# Patient Record
Sex: FEMALE | Race: WHITE | NOT HISPANIC OR LATINO | Employment: OTHER | URBAN - METROPOLITAN AREA
[De-identification: names, ages, dates, MRNs, and addresses within clinical notes are randomized per-mention and may not be internally consistent; named-entity substitution may affect disease eponyms.]

---

## 2017-01-27 ENCOUNTER — APPOINTMENT (OUTPATIENT)
Dept: LAB | Facility: HOSPITAL | Age: 82
End: 2017-01-27
Attending: INTERNAL MEDICINE
Payer: MEDICARE

## 2017-01-27 ENCOUNTER — TRANSCRIBE ORDERS (OUTPATIENT)
Dept: ADMINISTRATIVE | Facility: HOSPITAL | Age: 82
End: 2017-01-27

## 2017-01-27 DIAGNOSIS — D64.9 ANEMIA, UNSPECIFIED: ICD-10-CM

## 2017-01-27 DIAGNOSIS — I10 ESSENTIAL HYPERTENSION, MALIGNANT: ICD-10-CM

## 2017-01-27 DIAGNOSIS — E11.9 DIABETES MELLITUS WITHOUT COMPLICATION (HCC): ICD-10-CM

## 2017-01-27 DIAGNOSIS — E03.9 UNSPECIFIED HYPOTHYROIDISM: ICD-10-CM

## 2017-01-27 DIAGNOSIS — D64.9 ANEMIA, UNSPECIFIED: Primary | ICD-10-CM

## 2017-01-27 LAB
ALBUMIN SERPL BCP-MCNC: 3.5 G/DL (ref 3.5–5)
ALP SERPL-CCNC: 73 U/L (ref 46–116)
ALT SERPL W P-5'-P-CCNC: 28 U/L (ref 12–78)
ANION GAP SERPL CALCULATED.3IONS-SCNC: 8 MMOL/L (ref 4–13)
AST SERPL W P-5'-P-CCNC: 21 U/L (ref 5–45)
BASOPHILS # BLD AUTO: 0 THOUSANDS/ΜL (ref 0–0.1)
BASOPHILS NFR BLD AUTO: 1 % (ref 0–1)
BILIRUB SERPL-MCNC: 0.4 MG/DL (ref 0.2–1)
BUN SERPL-MCNC: 12 MG/DL (ref 5–25)
CALCIUM SERPL-MCNC: 9.2 MG/DL (ref 8.3–10.1)
CHLORIDE SERPL-SCNC: 102 MMOL/L (ref 100–108)
CO2 SERPL-SCNC: 28 MMOL/L (ref 21–32)
CREAT SERPL-MCNC: 0.9 MG/DL (ref 0.6–1.3)
EOSINOPHIL # BLD AUTO: 0.2 THOUSAND/ΜL (ref 0–0.61)
EOSINOPHIL NFR BLD AUTO: 4 % (ref 0–6)
ERYTHROCYTE [DISTWIDTH] IN BLOOD BY AUTOMATED COUNT: 13.4 % (ref 11.6–15.1)
EST. AVERAGE GLUCOSE BLD GHB EST-MCNC: 111 MG/DL
GFR SERPL CREATININE-BSD FRML MDRD: >60 ML/MIN/1.73SQ M
GLUCOSE SERPL-MCNC: 85 MG/DL (ref 65–140)
HBA1C MFR BLD: 5.5 % (ref 4.2–6.3)
HCT VFR BLD AUTO: 41.6 % (ref 37–47)
HGB BLD-MCNC: 13.6 G/DL (ref 12–16)
LYMPHOCYTES # BLD AUTO: 1.5 THOUSANDS/ΜL (ref 0.6–4.47)
LYMPHOCYTES NFR BLD AUTO: 34 % (ref 14–44)
MCH RBC QN AUTO: 30.1 PG (ref 27–31)
MCHC RBC AUTO-ENTMCNC: 32.8 G/DL (ref 31.4–37.4)
MCV RBC AUTO: 92 FL (ref 82–98)
MONOCYTES # BLD AUTO: 0.4 THOUSAND/ΜL (ref 0.17–1.22)
MONOCYTES NFR BLD AUTO: 10 % (ref 4–12)
NEUTROPHILS # BLD AUTO: 2.2 THOUSANDS/ΜL (ref 1.85–7.62)
NEUTS SEG NFR BLD AUTO: 51 % (ref 43–75)
NRBC BLD AUTO-RTO: 0 /100 WBCS
PLATELET # BLD AUTO: 279 THOUSANDS/UL (ref 130–400)
PMV BLD AUTO: 7.1 FL (ref 8.9–12.7)
POTASSIUM SERPL-SCNC: 4 MMOL/L (ref 3.5–5.3)
PROT SERPL-MCNC: 7.4 G/DL (ref 6.4–8.2)
RBC # BLD AUTO: 4.54 MILLION/UL (ref 4.2–5.4)
SODIUM SERPL-SCNC: 138 MMOL/L (ref 136–145)
TSH SERPL DL<=0.05 MIU/L-ACNC: 2.54 UIU/ML (ref 0.36–3.74)
WBC # BLD AUTO: 4.3 THOUSAND/UL (ref 4.8–10.8)

## 2017-01-27 PROCEDURE — 84443 ASSAY THYROID STIM HORMONE: CPT

## 2017-01-27 PROCEDURE — 83036 HEMOGLOBIN GLYCOSYLATED A1C: CPT | Performed by: INTERNAL MEDICINE

## 2017-01-27 PROCEDURE — 36415 COLL VENOUS BLD VENIPUNCTURE: CPT | Performed by: INTERNAL MEDICINE

## 2017-01-27 PROCEDURE — 80053 COMPREHEN METABOLIC PANEL: CPT | Performed by: INTERNAL MEDICINE

## 2017-01-27 PROCEDURE — 85025 COMPLETE CBC W/AUTO DIFF WBC: CPT | Performed by: INTERNAL MEDICINE

## 2017-03-02 ENCOUNTER — ANESTHESIA (OUTPATIENT)
Dept: GASTROENTEROLOGY | Facility: AMBULARY SURGERY CENTER | Age: 82
End: 2017-03-02
Payer: MEDICARE

## 2017-03-02 ENCOUNTER — HOSPITAL ENCOUNTER (OUTPATIENT)
Facility: AMBULARY SURGERY CENTER | Age: 82
Setting detail: OUTPATIENT SURGERY
Discharge: HOME | End: 2017-03-02
Attending: INTERNAL MEDICINE | Admitting: INTERNAL MEDICINE
Payer: MEDICARE

## 2017-03-02 VITALS
RESPIRATION RATE: 18 BRPM | HEART RATE: 88 BPM | BODY MASS INDEX: 25.95 KG/M2 | HEIGHT: 64 IN | OXYGEN SATURATION: 98 % | WEIGHT: 152 LBS | SYSTOLIC BLOOD PRESSURE: 141 MMHG | DIASTOLIC BLOOD PRESSURE: 71 MMHG | TEMPERATURE: 98.7 F

## 2017-03-02 DIAGNOSIS — Z12.11 ENCOUNTER FOR SCREENING FOR MALIGNANT NEOPLASM OF COLON: ICD-10-CM

## 2017-03-02 PROCEDURE — 88305 TISSUE EXAM BY PATHOLOGIST: CPT | Performed by: INTERNAL MEDICINE

## 2017-03-02 RX ORDER — LANOLIN ALCOHOL/MO/W.PET/CERES
1000 CREAM (GRAM) TOPICAL DAILY
COMMUNITY

## 2017-03-02 RX ORDER — PROPOFOL 10 MG/ML
INJECTION, EMULSION INTRAVENOUS AS NEEDED
Status: DISCONTINUED | OUTPATIENT
Start: 2017-03-02 | End: 2017-03-02 | Stop reason: SURG

## 2017-03-02 RX ORDER — ATORVASTATIN CALCIUM 20 MG/1
20 TABLET, FILM COATED ORAL DAILY
COMMUNITY

## 2017-03-02 RX ORDER — DIPHENOXYLATE HYDROCHLORIDE AND ATROPINE SULFATE 2.5; .025 MG/1; MG/1
1 TABLET ORAL DAILY
COMMUNITY

## 2017-03-02 RX ORDER — LISINOPRIL 20 MG/1
20 TABLET ORAL DAILY
COMMUNITY

## 2017-03-02 RX ORDER — SODIUM CHLORIDE, SODIUM LACTATE, POTASSIUM CHLORIDE, CALCIUM CHLORIDE 600; 310; 30; 20 MG/100ML; MG/100ML; MG/100ML; MG/100ML
100 INJECTION, SOLUTION INTRAVENOUS CONTINUOUS
Status: DISCONTINUED | OUTPATIENT
Start: 2017-03-02 | End: 2017-03-02 | Stop reason: HOSPADM

## 2017-03-02 RX ADMIN — LIDOCAINE HYDROCHLORIDE 50 MG: 20 INJECTION, SOLUTION INTRAVENOUS at 12:28

## 2017-03-02 RX ADMIN — PROPOFOL 100 MG: 10 INJECTION, EMULSION INTRAVENOUS at 12:28

## 2017-03-02 RX ADMIN — PROPOFOL 50 MG: 10 INJECTION, EMULSION INTRAVENOUS at 12:35

## 2017-03-02 RX ADMIN — PROPOFOL 50 MG: 10 INJECTION, EMULSION INTRAVENOUS at 12:30

## 2017-03-02 RX ADMIN — SODIUM CHLORIDE, SODIUM LACTATE, POTASSIUM CHLORIDE, AND CALCIUM CHLORIDE 100 ML/HR: .6; .31; .03; .02 INJECTION, SOLUTION INTRAVENOUS at 12:13

## 2017-04-10 ENCOUNTER — TRANSCRIBE ORDERS (OUTPATIENT)
Dept: ADMINISTRATIVE | Facility: HOSPITAL | Age: 82
End: 2017-04-10

## 2017-04-10 ENCOUNTER — LAB (OUTPATIENT)
Dept: LAB | Facility: HOSPITAL | Age: 82
End: 2017-04-10
Attending: INTERNAL MEDICINE
Payer: MEDICARE

## 2017-04-10 DIAGNOSIS — E78.00 PURE HYPERCHOLESTEROLEMIA: ICD-10-CM

## 2017-04-10 DIAGNOSIS — E78.00 PURE HYPERCHOLESTEROLEMIA: Primary | ICD-10-CM

## 2017-04-10 LAB
CHOLEST SERPL-MCNC: 180 MG/DL (ref 50–200)
HDLC SERPL-MCNC: 47 MG/DL (ref 40–60)
LDLC SERPL CALC-MCNC: 111 MG/DL (ref 0–100)
TRIGL SERPL-MCNC: 111 MG/DL

## 2017-04-10 PROCEDURE — 36415 COLL VENOUS BLD VENIPUNCTURE: CPT

## 2017-04-10 PROCEDURE — 80061 LIPID PANEL: CPT

## 2017-10-02 ENCOUNTER — TRANSCRIBE ORDERS (OUTPATIENT)
Dept: ADMINISTRATIVE | Facility: HOSPITAL | Age: 82
End: 2017-10-02

## 2017-10-02 DIAGNOSIS — Z12.31 VISIT FOR SCREENING MAMMOGRAM: Primary | ICD-10-CM

## 2017-10-04 ENCOUNTER — HOSPITAL ENCOUNTER (OUTPATIENT)
Dept: RADIOLOGY | Facility: HOSPITAL | Age: 82
Discharge: HOME | End: 2017-10-04
Attending: INTERNAL MEDICINE
Payer: MEDICARE

## 2017-10-04 DIAGNOSIS — Z12.31 VISIT FOR SCREENING MAMMOGRAM: ICD-10-CM

## 2017-10-04 PROCEDURE — G0202 SCR MAMMO BI INCL CAD: HCPCS

## 2017-12-08 ENCOUNTER — TRANSCRIBE ORDERS (OUTPATIENT)
Dept: ADMINISTRATIVE | Facility: HOSPITAL | Age: 82
End: 2017-12-08

## 2017-12-08 DIAGNOSIS — Z12.39 BREAST SCREENING: Primary | ICD-10-CM

## 2017-12-13 ENCOUNTER — HOSPITAL ENCOUNTER (OUTPATIENT)
Dept: RADIOLOGY | Facility: HOSPITAL | Age: 82
Discharge: HOME | End: 2017-12-13
Attending: INTERNAL MEDICINE
Payer: MEDICARE

## 2017-12-13 ENCOUNTER — GENERIC CONVERSION - ENCOUNTER (OUTPATIENT)
Dept: OTHER | Facility: OTHER | Age: 82
End: 2017-12-13

## 2017-12-13 DIAGNOSIS — Z12.39 BREAST SCREENING: ICD-10-CM

## 2017-12-13 PROCEDURE — 77080 DXA BONE DENSITY AXIAL: CPT

## 2018-05-17 ENCOUNTER — TRANSCRIBE ORDERS (OUTPATIENT)
Dept: ADMINISTRATIVE | Facility: HOSPITAL | Age: 83
End: 2018-05-17

## 2018-05-17 ENCOUNTER — APPOINTMENT (OUTPATIENT)
Dept: LAB | Facility: HOSPITAL | Age: 83
End: 2018-05-17
Attending: INTERNAL MEDICINE
Payer: MEDICARE

## 2018-05-17 DIAGNOSIS — E78.00 PURE HYPERCHOLESTEROLEMIA: ICD-10-CM

## 2018-05-17 DIAGNOSIS — D63.1 ANEMIA IN CHRONIC KIDNEY DISEASE, UNSPECIFIED CKD STAGE: Primary | ICD-10-CM

## 2018-05-17 DIAGNOSIS — E55.9 VITAMIN D DEFICIENCY DISEASE: ICD-10-CM

## 2018-05-17 DIAGNOSIS — E03.9 MYXEDEMA HEART DISEASE: ICD-10-CM

## 2018-05-17 DIAGNOSIS — D63.1 ANEMIA IN CHRONIC KIDNEY DISEASE, UNSPECIFIED CKD STAGE: ICD-10-CM

## 2018-05-17 DIAGNOSIS — I10 ESSENTIAL HYPERTENSION, MALIGNANT: ICD-10-CM

## 2018-05-17 DIAGNOSIS — N39.0 URINARY TRACT INFECTION WITHOUT HEMATURIA, SITE UNSPECIFIED: ICD-10-CM

## 2018-05-17 DIAGNOSIS — I51.9 MYXEDEMA HEART DISEASE: ICD-10-CM

## 2018-05-17 DIAGNOSIS — N18.9 ANEMIA IN CHRONIC KIDNEY DISEASE, UNSPECIFIED CKD STAGE: Primary | ICD-10-CM

## 2018-05-17 DIAGNOSIS — E10.8 TYPE 1 DIABETES MELLITUS WITH COMPLICATION (HCC): ICD-10-CM

## 2018-05-17 DIAGNOSIS — N18.9 ANEMIA IN CHRONIC KIDNEY DISEASE, UNSPECIFIED CKD STAGE: ICD-10-CM

## 2018-05-17 LAB
25(OH)D3 SERPL-MCNC: 29.6 NG/ML (ref 30–100)
ALBUMIN SERPL BCP-MCNC: 3.6 G/DL (ref 3.5–5)
ALP SERPL-CCNC: 80 U/L (ref 46–116)
ALT SERPL W P-5'-P-CCNC: 34 U/L (ref 12–78)
ANION GAP SERPL CALCULATED.3IONS-SCNC: 8 MMOL/L (ref 4–13)
AST SERPL W P-5'-P-CCNC: 20 U/L (ref 5–45)
BACTERIA UR QL AUTO: ABNORMAL /HPF
BASOPHILS # BLD AUTO: 0.04 THOUSANDS/ΜL (ref 0–0.1)
BASOPHILS NFR BLD AUTO: 1 % (ref 0–1)
BILIRUB SERPL-MCNC: 0.5 MG/DL (ref 0.2–1)
BILIRUB UR QL STRIP: NEGATIVE
BUN SERPL-MCNC: 11 MG/DL (ref 5–25)
CALCIUM SERPL-MCNC: 9.2 MG/DL (ref 8.3–10.1)
CHLORIDE SERPL-SCNC: 102 MMOL/L (ref 100–108)
CHOLEST SERPL-MCNC: 157 MG/DL (ref 50–200)
CLARITY UR: ABNORMAL
CO2 SERPL-SCNC: 28 MMOL/L (ref 21–32)
COLOR UR: ABNORMAL
CREAT SERPL-MCNC: 0.91 MG/DL (ref 0.6–1.3)
EOSINOPHIL # BLD AUTO: 0.29 THOUSAND/ΜL (ref 0–0.61)
EOSINOPHIL NFR BLD AUTO: 5 % (ref 0–6)
ERYTHROCYTE [DISTWIDTH] IN BLOOD BY AUTOMATED COUNT: 12.9 % (ref 11.6–15.1)
EST. AVERAGE GLUCOSE BLD GHB EST-MCNC: 105 MG/DL
GFR SERPL CREATININE-BSD FRML MDRD: 59 ML/MIN/1.73SQ M
GLUCOSE P FAST SERPL-MCNC: 92 MG/DL (ref 65–99)
GLUCOSE UR STRIP-MCNC: NEGATIVE MG/DL
HBA1C MFR BLD: 5.3 % (ref 4.2–6.3)
HCT VFR BLD AUTO: 41.6 % (ref 34.8–46.1)
HDLC SERPL-MCNC: 36 MG/DL (ref 40–60)
HGB BLD-MCNC: 13.3 G/DL (ref 11.5–15.4)
HGB UR QL STRIP.AUTO: ABNORMAL
IMM GRANULOCYTES # BLD AUTO: 0.02 THOUSAND/UL (ref 0–0.2)
IMM GRANULOCYTES NFR BLD AUTO: 0 % (ref 0–2)
KETONES UR STRIP-MCNC: NEGATIVE MG/DL
LDLC SERPL CALC-MCNC: 84 MG/DL (ref 0–100)
LEUKOCYTE ESTERASE UR QL STRIP: ABNORMAL
LYMPHOCYTES # BLD AUTO: 1.58 THOUSANDS/ΜL (ref 0.6–4.47)
LYMPHOCYTES NFR BLD AUTO: 30 % (ref 14–44)
MCH RBC QN AUTO: 30.6 PG (ref 26.8–34.3)
MCHC RBC AUTO-ENTMCNC: 32 G/DL (ref 31.4–37.4)
MCV RBC AUTO: 96 FL (ref 82–98)
MONOCYTES # BLD AUTO: 0.61 THOUSAND/ΜL (ref 0.17–1.22)
MONOCYTES NFR BLD AUTO: 11 % (ref 4–12)
NEUTROPHILS # BLD AUTO: 2.81 THOUSANDS/ΜL (ref 1.85–7.62)
NEUTS SEG NFR BLD AUTO: 53 % (ref 43–75)
NITRITE UR QL STRIP: POSITIVE
NON-SQ EPI CELLS URNS QL MICRO: ABNORMAL /HPF
NONHDLC SERPL-MCNC: 121 MG/DL
NRBC BLD AUTO-RTO: 0 /100 WBCS
PH UR STRIP.AUTO: 7.5 [PH] (ref 5–9)
PLATELET # BLD AUTO: 304 THOUSANDS/UL (ref 149–390)
PMV BLD AUTO: 8.8 FL (ref 8.9–12.7)
POTASSIUM SERPL-SCNC: 4.1 MMOL/L (ref 3.5–5.3)
PROT SERPL-MCNC: 7.5 G/DL (ref 6.4–8.2)
PROT UR STRIP-MCNC: NEGATIVE MG/DL
RBC # BLD AUTO: 4.35 MILLION/UL (ref 3.81–5.12)
RBC #/AREA URNS AUTO: ABNORMAL /HPF
SODIUM SERPL-SCNC: 138 MMOL/L (ref 136–145)
SP GR UR STRIP.AUTO: 1.01 (ref 1–1.03)
TRIGL SERPL-MCNC: 184 MG/DL
TSH SERPL DL<=0.05 MIU/L-ACNC: 2.19 UIU/ML (ref 0.36–3.74)
UROBILINOGEN UR QL STRIP.AUTO: 0.2 E.U./DL
WBC # BLD AUTO: 5.35 THOUSAND/UL (ref 4.31–10.16)
WBC #/AREA URNS AUTO: ABNORMAL /HPF

## 2018-05-17 PROCEDURE — 84443 ASSAY THYROID STIM HORMONE: CPT

## 2018-05-17 PROCEDURE — 80053 COMPREHEN METABOLIC PANEL: CPT

## 2018-05-17 PROCEDURE — 85025 COMPLETE CBC W/AUTO DIFF WBC: CPT

## 2018-05-17 PROCEDURE — 80061 LIPID PANEL: CPT

## 2018-05-17 PROCEDURE — 82306 VITAMIN D 25 HYDROXY: CPT

## 2018-05-17 PROCEDURE — 83036 HEMOGLOBIN GLYCOSYLATED A1C: CPT

## 2018-05-17 PROCEDURE — 36415 COLL VENOUS BLD VENIPUNCTURE: CPT

## 2018-05-17 PROCEDURE — 81001 URINALYSIS AUTO W/SCOPE: CPT | Performed by: INTERNAL MEDICINE

## 2018-05-29 ENCOUNTER — APPOINTMENT (OUTPATIENT)
Dept: LAB | Facility: HOSPITAL | Age: 83
End: 2018-05-29
Attending: INTERNAL MEDICINE
Payer: MEDICARE

## 2018-05-29 DIAGNOSIS — N39.0 URINARY TRACT INFECTION WITHOUT HEMATURIA, SITE UNSPECIFIED: ICD-10-CM

## 2018-05-29 LAB
BACTERIA UR QL AUTO: ABNORMAL /HPF
BILIRUB UR QL STRIP: NEGATIVE
CLARITY UR: ABNORMAL
COLOR UR: YELLOW
GLUCOSE UR STRIP-MCNC: NEGATIVE MG/DL
HGB UR QL STRIP.AUTO: NEGATIVE
KETONES UR STRIP-MCNC: NEGATIVE MG/DL
LEUKOCYTE ESTERASE UR QL STRIP: ABNORMAL
NITRITE UR QL STRIP: POSITIVE
NON-SQ EPI CELLS URNS QL MICRO: ABNORMAL /HPF
PH UR STRIP.AUTO: 7 [PH] (ref 5–9)
PROT UR STRIP-MCNC: NEGATIVE MG/DL
RBC #/AREA URNS AUTO: ABNORMAL /HPF
SP GR UR STRIP.AUTO: <=1.005 (ref 1–1.03)
UROBILINOGEN UR QL STRIP.AUTO: 0.2 E.U./DL
WBC #/AREA URNS AUTO: ABNORMAL /HPF

## 2018-05-29 PROCEDURE — 81001 URINALYSIS AUTO W/SCOPE: CPT | Performed by: INTERNAL MEDICINE

## 2018-05-29 PROCEDURE — 87186 SC STD MICRODIL/AGAR DIL: CPT

## 2018-05-29 PROCEDURE — 87077 CULTURE AEROBIC IDENTIFY: CPT

## 2018-05-29 PROCEDURE — 87086 URINE CULTURE/COLONY COUNT: CPT

## 2018-05-31 LAB — BACTERIA UR CULT: ABNORMAL

## 2018-07-14 ENCOUNTER — HOSPITAL ENCOUNTER (INPATIENT)
Facility: HOSPITAL | Age: 83
LOS: 5 days | Discharge: NURSING, REHAB OR LONG-TERM CARE | DRG: 369 | End: 2018-07-19
Attending: EMERGENCY MEDICINE | Admitting: INTERNAL MEDICINE
Payer: MEDICARE

## 2018-07-14 ENCOUNTER — APPOINTMENT (EMERGENCY)
Dept: RADIOLOGY | Facility: HOSPITAL | Age: 83
DRG: 369 | End: 2018-07-14
Payer: MEDICARE

## 2018-07-14 DIAGNOSIS — E87.6 HYPOKALEMIA: Primary | ICD-10-CM

## 2018-07-14 DIAGNOSIS — E87.6 HYPOKALEMIA DUE TO INADEQUATE POTASSIUM INTAKE: ICD-10-CM

## 2018-07-14 DIAGNOSIS — N39.0 UTI (URINARY TRACT INFECTION): ICD-10-CM

## 2018-07-14 DIAGNOSIS — K52.9 GASTROENTERITIS: ICD-10-CM

## 2018-07-14 DIAGNOSIS — K92.2 GI BLEED: ICD-10-CM

## 2018-07-14 LAB
ALBUMIN SERPL BCP-MCNC: 3.5 G/DL (ref 3.5–5)
ALP SERPL-CCNC: 62 U/L (ref 46–116)
ALT SERPL W P-5'-P-CCNC: 18 U/L (ref 12–78)
ANION GAP SERPL CALCULATED.3IONS-SCNC: 13 MMOL/L (ref 4–13)
AST SERPL W P-5'-P-CCNC: 16 U/L (ref 5–45)
BACTERIA UR QL AUTO: ABNORMAL /HPF
BASOPHILS # BLD AUTO: 0.01 THOUSANDS/ΜL (ref 0–0.1)
BASOPHILS # BLD AUTO: 0.01 THOUSANDS/ΜL (ref 0–0.1)
BASOPHILS NFR BLD AUTO: 0 % (ref 0–1)
BASOPHILS NFR BLD AUTO: 0 % (ref 0–1)
BILIRUB SERPL-MCNC: 0.5 MG/DL (ref 0.2–1)
BILIRUB UR QL STRIP: NEGATIVE
BUN SERPL-MCNC: 20 MG/DL (ref 5–25)
CALCIUM SERPL-MCNC: 8.5 MG/DL (ref 8.3–10.1)
CHLORIDE SERPL-SCNC: 103 MMOL/L (ref 100–108)
CLARITY UR: CLEAR
CO2 SERPL-SCNC: 23 MMOL/L (ref 21–32)
COLOR UR: YELLOW
CREAT SERPL-MCNC: 0.68 MG/DL (ref 0.6–1.3)
EOSINOPHIL # BLD AUTO: 0 THOUSAND/ΜL (ref 0–0.61)
EOSINOPHIL # BLD AUTO: 0 THOUSAND/ΜL (ref 0–0.61)
EOSINOPHIL NFR BLD AUTO: 0 % (ref 0–6)
EOSINOPHIL NFR BLD AUTO: 0 % (ref 0–6)
ERYTHROCYTE [DISTWIDTH] IN BLOOD BY AUTOMATED COUNT: 12.9 % (ref 11.6–15.1)
ERYTHROCYTE [DISTWIDTH] IN BLOOD BY AUTOMATED COUNT: 13 % (ref 11.6–15.1)
GFR SERPL CREATININE-BSD FRML MDRD: 82 ML/MIN/1.73SQ M
GLUCOSE SERPL-MCNC: 114 MG/DL (ref 65–140)
GLUCOSE UR STRIP-MCNC: NEGATIVE MG/DL
HCT VFR BLD AUTO: 31.6 % (ref 34.8–46.1)
HCT VFR BLD AUTO: 35.2 % (ref 34.8–46.1)
HGB BLD-MCNC: 10.6 G/DL (ref 11.5–15.4)
HGB BLD-MCNC: 12.1 G/DL (ref 11.5–15.4)
HGB UR QL STRIP.AUTO: ABNORMAL
IMM GRANULOCYTES # BLD AUTO: 0.05 THOUSAND/UL (ref 0–0.2)
IMM GRANULOCYTES # BLD AUTO: 0.06 THOUSAND/UL (ref 0–0.2)
IMM GRANULOCYTES NFR BLD AUTO: 1 % (ref 0–2)
IMM GRANULOCYTES NFR BLD AUTO: 1 % (ref 0–2)
KETONES UR STRIP-MCNC: ABNORMAL MG/DL
LEUKOCYTE ESTERASE UR QL STRIP: NEGATIVE
LIPASE SERPL-CCNC: 171 U/L (ref 73–393)
LYMPHOCYTES # BLD AUTO: 1.04 THOUSANDS/ΜL (ref 0.6–4.47)
LYMPHOCYTES # BLD AUTO: 1.3 THOUSANDS/ΜL (ref 0.6–4.47)
LYMPHOCYTES NFR BLD AUTO: 12 % (ref 14–44)
LYMPHOCYTES NFR BLD AUTO: 15 % (ref 14–44)
MCH RBC QN AUTO: 30.5 PG (ref 26.8–34.3)
MCH RBC QN AUTO: 31 PG (ref 26.8–34.3)
MCHC RBC AUTO-ENTMCNC: 33.5 G/DL (ref 31.4–37.4)
MCHC RBC AUTO-ENTMCNC: 34.4 G/DL (ref 31.4–37.4)
MCV RBC AUTO: 90 FL (ref 82–98)
MCV RBC AUTO: 91 FL (ref 82–98)
MONOCYTES # BLD AUTO: 0.67 THOUSAND/ΜL (ref 0.17–1.22)
MONOCYTES # BLD AUTO: 1.11 THOUSAND/ΜL (ref 0.17–1.22)
MONOCYTES NFR BLD AUTO: 13 % (ref 4–12)
MONOCYTES NFR BLD AUTO: 8 % (ref 4–12)
NEUTROPHILS # BLD AUTO: 6.08 THOUSANDS/ΜL (ref 1.85–7.62)
NEUTROPHILS # BLD AUTO: 6.87 THOUSANDS/ΜL (ref 1.85–7.62)
NEUTS SEG NFR BLD AUTO: 71 % (ref 43–75)
NEUTS SEG NFR BLD AUTO: 79 % (ref 43–75)
NITRITE UR QL STRIP: POSITIVE
NON-SQ EPI CELLS URNS QL MICRO: ABNORMAL /HPF
NRBC BLD AUTO-RTO: 0 /100 WBCS
NRBC BLD AUTO-RTO: 0 /100 WBCS
PH UR STRIP.AUTO: 6 [PH] (ref 5–9)
PLATELET # BLD AUTO: 253 THOUSANDS/UL (ref 149–390)
PLATELET # BLD AUTO: 286 THOUSANDS/UL (ref 149–390)
PMV BLD AUTO: 9.1 FL (ref 8.9–12.7)
PMV BLD AUTO: 9.3 FL (ref 8.9–12.7)
POTASSIUM SERPL-SCNC: 2.7 MMOL/L (ref 3.5–5.3)
PROT SERPL-MCNC: 7 G/DL (ref 6.4–8.2)
PROT UR STRIP-MCNC: NEGATIVE MG/DL
RBC # BLD AUTO: 3.48 MILLION/UL (ref 3.81–5.12)
RBC # BLD AUTO: 3.9 MILLION/UL (ref 3.81–5.12)
RBC #/AREA URNS AUTO: ABNORMAL /HPF
SODIUM SERPL-SCNC: 139 MMOL/L (ref 136–145)
SP GR UR STRIP.AUTO: 1.02 (ref 1–1.03)
UROBILINOGEN UR QL STRIP.AUTO: 0.2 E.U./DL
WBC # BLD AUTO: 8.56 THOUSAND/UL (ref 4.31–10.16)
WBC # BLD AUTO: 8.64 THOUSAND/UL (ref 4.31–10.16)
WBC #/AREA URNS AUTO: ABNORMAL /HPF

## 2018-07-14 PROCEDURE — 96375 TX/PRO/DX INJ NEW DRUG ADDON: CPT

## 2018-07-14 PROCEDURE — 96361 HYDRATE IV INFUSION ADD-ON: CPT

## 2018-07-14 PROCEDURE — 83690 ASSAY OF LIPASE: CPT | Performed by: EMERGENCY MEDICINE

## 2018-07-14 PROCEDURE — 36415 COLL VENOUS BLD VENIPUNCTURE: CPT | Performed by: EMERGENCY MEDICINE

## 2018-07-14 PROCEDURE — 71275 CT ANGIOGRAPHY CHEST: CPT

## 2018-07-14 PROCEDURE — 80053 COMPREHEN METABOLIC PANEL: CPT | Performed by: EMERGENCY MEDICINE

## 2018-07-14 PROCEDURE — C9113 INJ PANTOPRAZOLE SODIUM, VIA: HCPCS | Performed by: EMERGENCY MEDICINE

## 2018-07-14 PROCEDURE — 85025 COMPLETE CBC W/AUTO DIFF WBC: CPT | Performed by: INTERNAL MEDICINE

## 2018-07-14 PROCEDURE — 81001 URINALYSIS AUTO W/SCOPE: CPT | Performed by: EMERGENCY MEDICINE

## 2018-07-14 PROCEDURE — 85025 COMPLETE CBC W/AUTO DIFF WBC: CPT | Performed by: EMERGENCY MEDICINE

## 2018-07-14 PROCEDURE — 74177 CT ABD & PELVIS W/CONTRAST: CPT

## 2018-07-14 PROCEDURE — 96376 TX/PRO/DX INJ SAME DRUG ADON: CPT

## 2018-07-14 PROCEDURE — 96374 THER/PROPH/DIAG INJ IV PUSH: CPT

## 2018-07-14 PROCEDURE — C9113 INJ PANTOPRAZOLE SODIUM, VIA: HCPCS | Performed by: INTERNAL MEDICINE

## 2018-07-14 PROCEDURE — 99285 EMERGENCY DEPT VISIT HI MDM: CPT

## 2018-07-14 RX ORDER — CYCLOPENTOLATE HYDROCHLORIDE 10 MG/ML
1 SOLUTION/ DROPS OPHTHALMIC 3 TIMES DAILY
COMMUNITY

## 2018-07-14 RX ORDER — PREDNISOLONE ACETATE 10 MG/ML
1 SUSPENSION/ DROPS OPHTHALMIC 4 TIMES DAILY
Status: DISCONTINUED | OUTPATIENT
Start: 2018-07-14 | End: 2018-07-19 | Stop reason: HOSPADM

## 2018-07-14 RX ORDER — DEXTROSE, SODIUM CHLORIDE, AND POTASSIUM CHLORIDE 5; .9; .15 G/100ML; G/100ML; G/100ML
75 INJECTION INTRAVENOUS CONTINUOUS
Status: DISCONTINUED | OUTPATIENT
Start: 2018-07-14 | End: 2018-07-19 | Stop reason: HOSPADM

## 2018-07-14 RX ORDER — OFLOXACIN 3 MG/ML
1 SOLUTION/ DROPS OPHTHALMIC 4 TIMES DAILY
COMMUNITY

## 2018-07-14 RX ORDER — PREDNISOLONE ACETATE 10 MG/ML
1 SUSPENSION/ DROPS OPHTHALMIC 4 TIMES DAILY
COMMUNITY

## 2018-07-14 RX ORDER — ONDANSETRON 2 MG/ML
4 INJECTION INTRAMUSCULAR; INTRAVENOUS EVERY 6 HOURS PRN
Status: DISCONTINUED | OUTPATIENT
Start: 2018-07-14 | End: 2018-07-19 | Stop reason: HOSPADM

## 2018-07-14 RX ORDER — CHOLECALCIFEROL (VITAMIN D3) 125 MCG
1000 CAPSULE ORAL DAILY
Status: DISCONTINUED | OUTPATIENT
Start: 2018-07-14 | End: 2018-07-19 | Stop reason: HOSPADM

## 2018-07-14 RX ORDER — POTASSIUM CHLORIDE 20 MEQ/1
40 TABLET, EXTENDED RELEASE ORAL ONCE
Status: COMPLETED | OUTPATIENT
Start: 2018-07-14 | End: 2018-07-14

## 2018-07-14 RX ORDER — LEVOFLOXACIN 5 MG/ML
500 INJECTION, SOLUTION INTRAVENOUS EVERY 24 HOURS
Status: DISCONTINUED | OUTPATIENT
Start: 2018-07-15 | End: 2018-07-19 | Stop reason: HOSPADM

## 2018-07-14 RX ORDER — OFLOXACIN 3 MG/ML
1 SOLUTION/ DROPS OPHTHALMIC 4 TIMES DAILY
Status: DISCONTINUED | OUTPATIENT
Start: 2018-07-14 | End: 2018-07-19 | Stop reason: HOSPADM

## 2018-07-14 RX ORDER — ONDANSETRON 2 MG/ML
4 INJECTION INTRAMUSCULAR; INTRAVENOUS ONCE
Status: COMPLETED | OUTPATIENT
Start: 2018-07-14 | End: 2018-07-14

## 2018-07-14 RX ORDER — LISINOPRIL 20 MG/1
20 TABLET ORAL DAILY
Status: DISCONTINUED | OUTPATIENT
Start: 2018-07-14 | End: 2018-07-19 | Stop reason: HOSPADM

## 2018-07-14 RX ORDER — LEVOFLOXACIN 5 MG/ML
500 INJECTION, SOLUTION INTRAVENOUS ONCE
Status: COMPLETED | OUTPATIENT
Start: 2018-07-14 | End: 2018-07-14

## 2018-07-14 RX ORDER — FLUTICASONE PROPIONATE 50 MCG
1 SPRAY, SUSPENSION (ML) NASAL 2 TIMES DAILY
COMMUNITY

## 2018-07-14 RX ORDER — FLUTICASONE PROPIONATE 50 MCG
1 SPRAY, SUSPENSION (ML) NASAL 2 TIMES DAILY
Status: DISCONTINUED | OUTPATIENT
Start: 2018-07-14 | End: 2018-07-19 | Stop reason: HOSPADM

## 2018-07-14 RX ORDER — ATORVASTATIN CALCIUM 20 MG/1
20 TABLET, FILM COATED ORAL
Status: DISCONTINUED | OUTPATIENT
Start: 2018-07-14 | End: 2018-07-19 | Stop reason: HOSPADM

## 2018-07-14 RX ORDER — ACETAMINOPHEN 325 MG/1
650 TABLET ORAL EVERY 6 HOURS PRN
Status: DISCONTINUED | OUTPATIENT
Start: 2018-07-14 | End: 2018-07-17

## 2018-07-14 RX ORDER — ONDANSETRON 2 MG/ML
INJECTION INTRAMUSCULAR; INTRAVENOUS
Status: COMPLETED
Start: 2018-07-14 | End: 2018-07-14

## 2018-07-14 RX ORDER — PANTOPRAZOLE SODIUM 40 MG/1
40 INJECTION, POWDER, FOR SOLUTION INTRAVENOUS EVERY 12 HOURS SCHEDULED
Status: DISCONTINUED | OUTPATIENT
Start: 2018-07-14 | End: 2018-07-19 | Stop reason: HOSPADM

## 2018-07-14 RX ORDER — CYCLOPENTOLATE HYDROCHLORIDE 10 MG/ML
1 SOLUTION/ DROPS OPHTHALMIC 3 TIMES DAILY
Status: DISCONTINUED | OUTPATIENT
Start: 2018-07-14 | End: 2018-07-19 | Stop reason: HOSPADM

## 2018-07-14 RX ORDER — PANTOPRAZOLE SODIUM 40 MG/1
40 INJECTION, POWDER, FOR SOLUTION INTRAVENOUS ONCE
Status: COMPLETED | OUTPATIENT
Start: 2018-07-14 | End: 2018-07-14

## 2018-07-14 RX ADMIN — OFLOXACIN 1 DROP: 3 SOLUTION/ DROPS OPHTHALMIC at 17:22

## 2018-07-14 RX ADMIN — IOHEXOL 100 ML: 350 INJECTION, SOLUTION INTRAVENOUS at 11:47

## 2018-07-14 RX ADMIN — ONDANSETRON 4 MG: 2 INJECTION INTRAMUSCULAR; INTRAVENOUS at 10:37

## 2018-07-14 RX ADMIN — PANTOPRAZOLE SODIUM 40 MG: 40 INJECTION, POWDER, FOR SOLUTION INTRAVENOUS at 15:00

## 2018-07-14 RX ADMIN — SODIUM CHLORIDE 1000 ML: 0.9 INJECTION, SOLUTION INTRAVENOUS at 10:33

## 2018-07-14 RX ADMIN — LISINOPRIL 20 MG: 20 TABLET ORAL at 17:22

## 2018-07-14 RX ADMIN — PANTOPRAZOLE SODIUM 40 MG: 40 INJECTION, POWDER, FOR SOLUTION INTRAVENOUS at 12:46

## 2018-07-14 RX ADMIN — PREDNISOLONE ACETATE 1 DROP: 10 SUSPENSION/ DROPS OPHTHALMIC at 17:22

## 2018-07-14 RX ADMIN — PREDNISOLONE ACETATE 1 DROP: 10 SUSPENSION/ DROPS OPHTHALMIC at 22:22

## 2018-07-14 RX ADMIN — ONDANSETRON 4 MG: 2 INJECTION INTRAMUSCULAR; INTRAVENOUS at 12:43

## 2018-07-14 RX ADMIN — FLUTICASONE PROPIONATE 1 SPRAY: 50 SPRAY, METERED NASAL at 17:22

## 2018-07-14 RX ADMIN — Medication 1000 MCG: at 15:00

## 2018-07-14 RX ADMIN — CYCLOPENTOLATE HYDROCHLORIDE 1 DROP: 10 SOLUTION/ DROPS OPHTHALMIC at 17:22

## 2018-07-14 RX ADMIN — OFLOXACIN 1 DROP: 3 SOLUTION/ DROPS OPHTHALMIC at 14:46

## 2018-07-14 RX ADMIN — LEVOFLOXACIN 500 MG: 5 INJECTION, SOLUTION INTRAVENOUS at 13:31

## 2018-07-14 RX ADMIN — ATORVASTATIN CALCIUM 20 MG: 20 TABLET, FILM COATED ORAL at 17:23

## 2018-07-14 RX ADMIN — POTASSIUM CHLORIDE 40 MEQ: 1500 TABLET, EXTENDED RELEASE ORAL at 11:30

## 2018-07-14 RX ADMIN — PREDNISOLONE ACETATE 1 DROP: 10 SUSPENSION/ DROPS OPHTHALMIC at 14:46

## 2018-07-14 RX ADMIN — PANTOPRAZOLE SODIUM 40 MG: 40 INJECTION, POWDER, FOR SOLUTION INTRAVENOUS at 22:00

## 2018-07-14 RX ADMIN — OFLOXACIN 1 DROP: 3 SOLUTION/ DROPS OPHTHALMIC at 21:49

## 2018-07-14 RX ADMIN — DEXTROSE, SODIUM CHLORIDE, AND POTASSIUM CHLORIDE 75 ML/HR: 5; .9; .15 INJECTION INTRAVENOUS at 15:48

## 2018-07-14 RX ADMIN — CYCLOPENTOLATE HYDROCHLORIDE 1 DROP: 10 SOLUTION/ DROPS OPHTHALMIC at 22:00

## 2018-07-14 NOTE — ED PROVIDER NOTES
History  Chief Complaint   Patient presents with    Vomiting     pt c/o nausea and vomiting for 2 days, lower abd pain since last night  pt had l eye surgery Wed  Pt has no appetite, not eating according to nephew   Weakness - Generalized     Patient is a 33-year-old female was brought in by her son for several days of not being able to get out of bed, having episodes of nausea vomiting and diarrhea  Patient had a eye surgery done 2 days ago 1 day prior to the onset of the symptoms  Patient denies any fevers or chills, she does have intermittent lower abdominal pain that she is not sure is associated with anything in particular  Patient states she has no appetite and basic is not eaten anything for several days and only a small amount of water  Patient called her son that she feel well wanted to come to the hospital so son picked her up and brought her here for evaluation  The patient lives alone and nobody in the house to give her any care  Patient denies any chest pain or shortness of breath  Prior to Admission Medications   Prescriptions Last Dose Informant Patient Reported?  Taking?   aspirin 81 MG tablet 2018 at Unknown time  Yes Yes   Sig: Take 81 mg by mouth daily   atorvastatin (LIPITOR) 20 mg tablet 2018 at Unknown time  Yes Yes   Sig: Take 20 mg by mouth daily   cyanocobalamin (VITAMIN B-12) 1,000 mcg tablet 2018 at Unknown time  Yes Yes   Sig: Take 1,000 mcg by mouth daily   cyclopentolate (CYCLOGYL) 1 % ophthalmic solution 2018 at 1106  Yes Yes   Sig: Administer 1 drop into the left eye 3 (three) times a day   fluticasone (FLONASE) 50 mcg/act nasal spray 2018 at Unknown time  Yes Yes   Si spray into each nostril 2 (two) times a day   lisinopril (ZESTRIL) 20 mg tablet 2018 at Unknown time  Yes Yes   Sig: Take 20 mg by mouth daily   multivitamin (THERAGRAN) TABS 2018 at Unknown time  Yes Yes   Sig: Take 1 tablet by mouth daily   ofloxacin (OCUFLOX) 0 3 % ophthalmic solution 2018 at 1054  Yes Yes   Sig: Administer 1 drop into the left eye 4 (four) times a day   prednisoLONE acetate (PREDNISOLONE ACETATE P-F) 1 % ophthalmic suspension 2018 at 1114  Yes Yes   Si drop 4 (four) times a day      Facility-Administered Medications: None       Past Medical History:   Diagnosis Date    Arthritis     Hyperlipidemia     Hypertension     Urinary tract infection        Past Surgical History:   Procedure Laterality Date    CATARACT EXTRACTION      COLONOSCOPY N/A 3/2/2017    Procedure: COLONOSCOPY;  Surgeon: Marie Bravo MD;  Location: [facility]; Service:     JOINT REPLACEMENT Bilateral ,     hips    REFRACTIVE SURGERY Right        History reviewed  No pertinent family history  I have reviewed and agree with the history as documented  Social History   Substance Use Topics    Smoking status: Never Smoker    Smokeless tobacco: Not on file    Alcohol use Not on file      Comment: on holidays        Review of Systems   Constitutional: Negative for chills and fever  HENT: Negative for facial swelling and trouble swallowing  Eyes: Negative for photophobia and discharge  Respiratory: Negative for chest tightness and shortness of breath  Cardiovascular: Negative for chest pain and leg swelling  Gastrointestinal: Positive for abdominal pain, diarrhea, nausea and vomiting  Genitourinary: Negative for dysuria and flank pain  Musculoskeletal: Negative for back pain, neck pain and neck stiffness  Skin: Negative  Neurological: Negative for weakness and numbness  Hematological: Negative  Psychiatric/Behavioral: Negative  Physical Exam  Physical Exam   Constitutional: She is oriented to person, place, and time  She appears well-developed and well-nourished  No distress  HENT:   Head: Normocephalic and atraumatic  Eyes: Pupils are equal, round, and reactive to light  Neck: Normal range of motion   Neck supple  Cardiovascular: Normal rate and regular rhythm  Pulmonary/Chest: Effort normal and breath sounds normal    Abdominal: Soft  Normal appearance  There is tenderness in the right lower quadrant, suprapubic area and left lower quadrant  There is no rigidity, no rebound and no guarding  Musculoskeletal: Normal range of motion  She exhibits no edema  Neurological: She is alert and oriented to person, place, and time  Skin: Skin is warm and dry  Capillary refill takes 2 to 3 seconds  Psychiatric: She has a normal mood and affect  Nursing note and vitals reviewed        Vital Signs  ED Triage Vitals   Temperature Pulse Respirations Blood Pressure SpO2   07/14/18 1114 07/14/18 0940 07/14/18 0940 07/14/18 0940 07/14/18 0940   98 1 °F (36 7 °C) 86 18 (!) 176/88 98 %      Temp Source Heart Rate Source Patient Position - Orthostatic VS BP Location FiO2 (%)   07/14/18 0940 07/14/18 0940 07/14/18 0940 07/14/18 0940 --   Tympanic Monitor Lying Left arm       Pain Score       --                  Vitals:    07/14/18 0940 07/14/18 1114   BP: (!) 176/88 (!) 175/80   Pulse: 86 82   Patient Position - Orthostatic VS: Lying Lying       Visual Acuity      ED Medications  Medications   levofloxacin (LEVAQUIN) IVPB (premix) 500 mg (not administered)   sodium chloride 0 9 % bolus 1,000 mL (1,000 mL Intravenous New Bag 7/14/18 1033)   ondansetron (ZOFRAN) injection 4 mg (4 mg Intravenous Given 7/14/18 1037)   potassium chloride (K-DUR,KLOR-CON) CR tablet 40 mEq (40 mEq Oral Given 7/14/18 1130)   iohexol (OMNIPAQUE) 350 MG/ML injection (MULTI-DOSE) 100 mL (100 mL Intravenous Given 7/14/18 1147)   ondansetron (ZOFRAN) injection 4 mg (4 mg Intravenous Given 7/14/18 1243)   pantoprazole (PROTONIX) injection 40 mg (40 mg Intravenous Given 7/14/18 1246)       Diagnostic Studies  Results Reviewed     Procedure Component Value Units Date/Time    Urine Microscopic [[id number]]  (Abnormal) Collected:  07/14/18 1120    Lab Status: Final result Specimen:  Urine from Urine, Clean Catch Updated:  07/14/18 1135     RBC, UA 0-1 (A) /hpf      WBC, UA 4-10 (A) /hpf      Epithelial Cells Occasional /hpf      Bacteria, UA Innumerable (A) /hpf     Comprehensive metabolic panel [[id number]]  (Abnormal) Collected:  07/14/18 1033    Lab Status:  Final result Specimen:  Blood from Arm, Left Updated:  07/14/18 1126     Sodium 139 mmol/L      Potassium 2 7 (LL) mmol/L      Chloride 103 mmol/L      CO2 23 mmol/L      Anion Gap 13 mmol/L      BUN 20 mg/dL      Creatinine 0 68 mg/dL      Glucose 114 mg/dL      Calcium 8 5 mg/dL      AST 16 U/L      ALT 18 U/L      Alkaline Phosphatase 62 U/L      Total Protein 7 0 g/dL      Albumin 3 5 g/dL      Total Bilirubin 0 50 mg/dL      eGFR 82 ml/min/1 73sq m     Narrative:         National Kidney Disease Education Program recommendations are as follows:  GFR calculation is accurate only with a steady state creatinine  Chronic Kidney disease less than 60 ml/min/1 73 sq  meters  Kidney failure less than 15 ml/min/1 73 sq  meters      UA w Reflex to Microscopic w Reflex to Culture [[id number]]  (Abnormal) Collected:  07/14/18 1120    Lab Status:  Final result Specimen:  Urine from Urine, Clean Catch Updated:  07/14/18 1125     Color, UA Yellow     Clarity, UA Clear     Specific Gravity, UA 1 025     pH, UA 6 0     Leukocytes, UA Negative     Nitrite, UA Positive (A)     Protein, UA Negative mg/dl      Glucose, UA Negative mg/dl      Ketones, UA 15 (1+) (A) mg/dl      Urobilinogen, UA 0 2 E U /dl      Bilirubin, UA Negative     Blood, UA Moderate (A)    Lipase [[id number]]  (Normal) Collected:  07/14/18 1033    Lab Status:  Final result Specimen:  Blood from Arm, Left Updated:  07/14/18 1114     Lipase 171 u/L     CBC and differential [[id number]]  (Abnormal) Collected:  07/14/18 1033    Lab Status:  Final result Specimen:  Blood from Arm, Left Updated:  07/14/18 1041     WBC 8 64 Thousand/uL      RBC 3 90 Million/uL Hemoglobin 12 1 g/dL      Hematocrit 35 2 %      MCV 90 fL      MCH 31 0 pg      MCHC 34 4 g/dL      RDW 13 0 %      MPV 9 3 fL      Platelets 901 Thousands/uL      nRBC 0 /100 WBCs      Neutrophils Relative 79 (H) %      Immat GRANS % 1 %      Lymphocytes Relative 12 (L) %      Monocytes Relative 8 %      Eosinophils Relative 0 %      Basophils Relative 0 %      Neutrophils Absolute 6 87 Thousands/µL      Immature Grans Absolute 0 05 Thousand/uL      Lymphocytes Absolute 1 04 Thousands/µL      Monocytes Absolute 0 67 Thousand/µL      Eosinophils Absolute 0 00 Thousand/µL      Basophils Absolute 0 01 Thousands/µL                  PE Study with CT Abdomen and Pelvis with contrast   Final Result by Charmaine Mcmullen MD (07/14 2662)      1  Emphysema  2   No evidence of pulmonary embolus or other acute abnormality in the chest    3   Hiatal hernia and generalized mural thickening of the esophagus, consistent with esophagitis or presbyesophagus  4   No acute abnormality in the abdomen or pelvis  5   Sigmoid diverticulosis without evidence of diverticulitis  Workstation performed: [id number]                    Procedures  Procedures       Phone Contacts  ED Phone Contact    ED Course                               MDM  Number of Diagnoses or Management Options  GI bleed:   Hypokalemia:   UTI (urinary tract infection):   Diagnosis management comments: Patient had a episode of a coffee ground emesis while she was in the emergency room  The patient is found to have a UTI, low potassium male area  All pain  Patient will need to be admitted to the hospital for evaluation of the GI bleed, treatment of the UTI and replacement of the potassium  Patient states understanding is in agreement the assessment plan         Amount and/or Complexity of Data Reviewed  Clinical lab tests: ordered  Tests in the radiology section of CPT®: ordered and reviewed      CritCare Time    Disposition  Final diagnoses: Hypokalemia   GI bleed   UTI (urinary tract infection)     Time reflects when diagnosis was documented in both MDM as applicable and the Disposition within this note     Time User Action Codes Description Comment    7/14/2018 12:59 PM Ligia Polo Add [E87 6] Hypokalemia     7/14/2018 12:59 PM Ligia Polo Add [K92 2] GI bleed     7/14/2018  1:00 PM Ligia Polo Add [N39 0] UTI (urinary tract infection)       ED Disposition     ED Disposition Condition Comment    Admit  Case was discussed with Dr Hollingsworth and the patient's admission status was agreed to be Admission Status: inpatient status to the service of Dr Cuca French          Follow-up Information    None         Patient's Medications   Discharge Prescriptions    No medications on file     No discharge procedures on file      ED Provider  Electronically Signed by           Charles Hatfield MD  07/14/18 1991

## 2018-07-14 NOTE — ED NOTES
Bee Oneal RN made aware pt is on 3 eye drops   Pt just had l eye surgery July 11, instructions and all eye drops with pt       Skeet Day, JOSELYN  07/14/18 3026

## 2018-07-14 NOTE — H&P
H&P Exam - Arianne Solorio 82 y o  female MRN: [id number]    Unit/Bed#: [address] [place] [address] Encounter: [id number]      History of Present Illness     HPI:  Arianne Solorio is a 82 y o  female who presents with nausea vomiting and also noted to have small amount of bright red blood and dark blood which was noticed by the emergency room physician while she was in the ER  Patient has been not feeling well for last for 5 days  She went for cataract surgery but 3 days ago and her symptoms got worse after that felt weak tired not eating and drinking she also started having nausea vomiting diarrhea  She also had 1 single black bowel movement approximately 1 week ago  Patient is on aspirin 81 mg daily she is also on iron supplements  No fever cough congestion cold symptoms  No urinary symptoms of increased frequency urgency but does complain of abdominal discomfort feeling on and off no pain in the flank area  Workup in the emergency room has also revealed findings suggestive of urinary tract infection           Review of Systems   Constitutional: Positive for appetite change and fatigue  Negative for fever  HENT: Negative  Eyes: Negative  Respiratory: Negative for cough and shortness of breath  Cardiovascular: Negative for chest pain, palpitations and leg swelling  Gastrointestinal: Positive for abdominal pain, diarrhea, nausea and vomiting  Endocrine: Negative  Genitourinary: Negative for difficulty urinating, flank pain, hematuria and urgency  Musculoskeletal: Positive for arthralgias and myalgias  Skin: Negative  Neurological: Positive for weakness  Negative for dizziness, speech difficulty and headaches  Hematological: Negative          Historical Information   Past Medical History:   Diagnosis Date    Arthritis     Hyperlipidemia     Hypertension     Urinary tract infection      Past Surgical History:   Procedure Laterality Date    CATARACT EXTRACTION      COLONOSCOPY N/A 3/2/2017    Procedure: COLONOSCOPY;  Surgeon: Iris Degroot MD;  Location: [facility]; Service:     JOINT REPLACEMENT Bilateral 2011, 2012    hips    REFRACTIVE SURGERY Right      History reviewed  No pertinent family history  Social History   History   Alcohol Use No     Comment: on holidays     History   Drug Use No     History   Smoking Status    Never Smoker   Smokeless Tobacco    Never Used         Meds/Allergies     No Known Allergies    Prescriptions Prior to Admission   Medication Sig Dispense Refill Last Dose    aspirin 81 MG tablet Take 81 mg by mouth daily   7/13/2018 at 0900    atorvastatin (LIPITOR) 20 mg tablet Take 20 mg by mouth daily   7/13/2018 at 2100    cyanocobalamin (VITAMIN B-12) 1,000 mcg tablet Take 1,000 mcg by mouth daily   7/13/2018 at 0900    cyclopentolate (CYCLOGYL) 1 % ophthalmic solution Administer 1 drop into the left eye 3 (three) times a day   7/14/2018 at 1106    fluticasone (FLONASE) 50 mcg/act nasal spray 1 spray into each nostril 2 (two) times a day   7/13/2018 at 2100    lisinopril (ZESTRIL) 20 mg tablet Take 20 mg by mouth daily   7/13/2018 at 2100    multivitamin (THERAGRAN) TABS Take 1 tablet by mouth daily   7/13/2018 at 0900    ofloxacin (OCUFLOX) 0 3 % ophthalmic solution Administer 1 drop into the left eye 4 (four) times a day   7/14/2018 at 1054    prednisoLONE acetate (PREDNISOLONE ACETATE P-F) 1 % ophthalmic suspension 1 drop 4 (four) times a day   7/14/2018 at 1114       Objective   Vitals: Blood pressure (!) 165/105, pulse 69, temperature 97 7 °F (36 5 °C), temperature source Oral, resp  rate 18, height 5' 4" (1 626 m), weight 67 1 kg (148 lb), SpO2 95 %, not currently breastfeeding      No intake or output data in the 24 hours ending 07/14/18 1742    Invasive Devices     Peripheral Intravenous Line            Peripheral IV 07/14/18 Left Wrist less than 1 day                Physical Exam   Constitutional: She is oriented to person, place, and time  No distress  HENT:   Head: Atraumatic  Eyes: Conjunctivae are normal  Pupils are equal, round, and reactive to light  Neck: Neck supple  No thyromegaly present  Cardiovascular: Normal rate and regular rhythm  Murmur heard  Pulmonary/Chest: Effort normal and breath sounds normal    Abdominal: Soft  Bowel sounds are normal    Musculoskeletal: Normal range of motion  She exhibits no edema, tenderness or deformity  Neurological: She is alert and oriented to person, place, and time  She has normal reflexes  No cranial nerve deficit  Skin: Skin is dry  No rash noted  No pallor  Skin turgor is poor   Psychiatric: She has a normal mood and affect         Lab Results:   Recent Results (from the past 72 hour(s))   CBC and differential    Collection Time: 07/14/18 10:33 AM   Result Value Ref Range    WBC 8 64 4 31 - 10 16 Thousand/uL    RBC 3 90 3 81 - 5 12 Million/uL    Hemoglobin 12 1 11 5 - 15 4 g/dL    Hematocrit 35 2 34 8 - 46 1 %    MCV 90 82 - 98 fL    MCH 31 0 26 8 - 34 3 pg    MCHC 34 4 31 4 - 37 4 g/dL    RDW 13 0 11 6 - 15 1 %    MPV 9 3 8 9 - 12 7 fL    Platelets 875 895 - 739 Thousands/uL    nRBC 0 /100 WBCs    Neutrophils Relative 79 (H) 43 - 75 %    Immat GRANS % 1 0 - 2 %    Lymphocytes Relative 12 (L) 14 - 44 %    Monocytes Relative 8 4 - 12 %    Eosinophils Relative 0 0 - 6 %    Basophils Relative 0 0 - 1 %    Neutrophils Absolute 6 87 1 85 - 7 62 Thousands/µL    Immature Grans Absolute 0 05 0 00 - 0 20 Thousand/uL    Lymphocytes Absolute 1 04 0 60 - 4 47 Thousands/µL    Monocytes Absolute 0 67 0 17 - 1 22 Thousand/µL    Eosinophils Absolute 0 00 0 00 - 0 61 Thousand/µL    Basophils Absolute 0 01 0 00 - 0 10 Thousands/µL   Comprehensive metabolic panel    Collection Time: 07/14/18 10:33 AM   Result Value Ref Range    Sodium 139 136 - 145 mmol/L    Potassium 2 7 (LL) 3 5 - 5 3 mmol/L    Chloride 103 100 - 108 mmol/L    CO2 23 21 - 32 mmol/L    Anion Gap 13 4 - 13 mmol/L    BUN 20 5 - 25 mg/dL    Creatinine 0 68 0 60 - 1 30 mg/dL    Glucose 114 65 - 140 mg/dL    Calcium 8 5 8 3 - 10 1 mg/dL    AST 16 5 - 45 U/L    ALT 18 12 - 78 U/L    Alkaline Phosphatase 62 46 - 116 U/L    Total Protein 7 0 6 4 - 8 2 g/dL    Albumin 3 5 3 5 - 5 0 g/dL    Total Bilirubin 0 50 0 20 - 1 00 mg/dL    eGFR 82 ml/min/1 73sq m   Lipase    Collection Time: 07/14/18 10:33 AM   Result Value Ref Range    Lipase 171 73 - 393 u/L   UA w Reflex to Microscopic w Reflex to Culture    Collection Time: 07/14/18 11:20 AM   Result Value Ref Range    Color, UA Yellow     Clarity, UA Clear     Specific Gravity, UA 1 025 1 000 - 1 030    pH, UA 6 0 5 0 - 9 0    Leukocytes, UA Negative Negative    Nitrite, UA Positive (A) Negative    Protein, UA Negative Negative mg/dl    Glucose, UA Negative Negative mg/dl    Ketones, UA 15 (1+) (A) Negative mg/dl    Urobilinogen, UA 0 2 0 2, 1 0 E U /dl E U /dl    Bilirubin, UA Negative Negative    Blood, UA Moderate (A) Negative   Urine Microscopic    Collection Time: 07/14/18 11:20 AM   Result Value Ref Range    RBC, UA 0-1 (A) None Seen, 0-5 /hpf    WBC, UA 4-10 (A) None Seen, 0-5, 5-55, 5-65 /hpf    Epithelial Cells Occasional None Seen, Occasional /hpf    Bacteria, UA Innumerable (A) None Seen, Occasional /hpf       Imaging:   PE Study with CT Abdomen and Pelvis with contrast   Final Result      1  Emphysema  2   No evidence of pulmonary embolus or other acute abnormality in the chest    3   Hiatal hernia and generalized mural thickening of the esophagus, consistent with esophagitis or presbyesophagus  4   No acute abnormality in the abdomen or pelvis  5   Sigmoid diverticulosis without evidence of diverticulitis  Workstation performed: [id number]             PE Study with CT Abdomen and Pelvis with contrast   Final Result      1  Emphysema     2   No evidence of pulmonary embolus or other acute abnormality in the chest    3   Hiatal hernia and generalized mural thickening of the esophagus, consistent with esophagitis or presbyesophagus  4   No acute abnormality in the abdomen or pelvis  5   Sigmoid diverticulosis without evidence of diverticulitis  Workstation performed: [id number]             EKG, Pathology, and Other Studies: I have personally reviewed pertinent reports  Assessment/Plan     Assessment:  Patient with scant hematemesis most likely Diane-Cancino tear doubt ulcers however she is on aspirin which may be a contributing factor for GI bleed secondary to peptic ulcer disease  Patient did have a melanotic stool about a week ago  Nausea vomiting and diarrhea probably secondary to gastroenteritis  Urinary tract infection  Hypertension  Degenerative joint disease  Hypercholesterolemia  Plan:  Clear liquid diet  IV fluids  IV antibiotics  GI consultation  Monitor hemoglobin and hematocrit    Code Status: Level 1 - Full Code  Advance Directive and Living Will:      Power of :    POLST:      Counseling / Coordination of Care  Total floor / unit time spent today 60 minutes  Greater than 50% of total time was spent with the patient and / or family counseling and / or coordination of care  A description of the counseling / coordination of care:  Discussed with emergency room physician emergency room nurse staff on the floor discussed with the patient at length old records reviewed  GI consultation reviewed

## 2018-07-14 NOTE — CONSULTS
Consultation - GI   Jaswant Llanes 82 y o  female MRN: [id number]  Unit/Bed#: [address] [place] [address] Encounter: [id number]      Assessment/Plan     Assessment:  1  Scant hematemesis  Given the description by her nephew, this seems likely to represent a Diane-Cancino tear, though other etiologies should be considered  She has no evidence at this time active, ongoing, hemodynamically significant bleeding  2   Possible melenic stool 1 week ago  This raises the question of possible peptic ulcer disease secondary to aspirin  3  Nausea, vomiting, diarrhea  Symptoms most suggestive of an acute viral illness and improving  4   UTI  This might also be the cause of many of her symptoms    Plan:  1  No evidence active bleeding, therefore hold off on invasive, endoscopic evaluation  If she does have evidence significant GI bleeding, will consider urgent EGD  We will try to obtain records of her prior endoscopic evaluation  2   Continue pantoprazole  I    History of Present Illness   Physician Requesting Consult: Dean Marshall MD  Reason for Consult / Principal Problem:  Hematemesis  Hx and PE limited by:   HPI: Jaswant Llanes is a 82y o  year old female who presents with nausea, vomiting with some scant bright red blood, diarrhea, lethargy, malaise  Patient underwent ophthalmic surgery 3 days ago reports that even prior to this she was starting to feel somewhat on well  Since her surgery, however, she has developed the above symptoms and was unable to care for herself at home as she lives alone  She presented to the emergency room this morning where she was noted to evidence of urinary tract infection  She currently complains mild discomfort, fatigue but otherwise reports her nausea vomiting and diarrhea mostly resolved  She does report which she describes as a single black bowel movement approximately 1 week ago  She takes an iron supplement but this was different than her typical stool color    She reports having undergone upper endoscopy here approximately 2 years ago, though I cannot find any records of this  She takes a baby aspirin but denies other NSAID use  Consults    Review of Systems   Constitutional: Negative for fever  HENT: Negative for congestion, nosebleeds and trouble swallowing  Eyes: Positive for photophobia  Respiratory: Negative for cough  Cardiovascular: Negative for chest pain  Gastrointestinal: Positive for abdominal pain, diarrhea, nausea and vomiting  Negative for abdominal distention  Genitourinary: Negative for dysuria  Musculoskeletal: Positive for arthralgias  Skin: Negative for color change  Neurological: Positive for light-headedness  Negative for dizziness  Hematological: Negative for adenopathy  Psychiatric/Behavioral: Negative for agitation  Historical Information   Past Medical History:   Diagnosis Date    Arthritis     Hyperlipidemia     Hypertension     Urinary tract infection      Past Surgical History:   Procedure Laterality Date    CATARACT EXTRACTION      COLONOSCOPY N/A 3/2/2017    Procedure: COLONOSCOPY;  Surgeon: Mono Forrester MD;  Location: [facility]; Service:     JOINT REPLACEMENT Bilateral 2011, 2012    hips    REFRACTIVE SURGERY Right      Social History   History   Alcohol Use No     Comment: on holidays     History   Drug Use No     History   Smoking Status    Never Smoker   Smokeless Tobacco    Never Used     Family History:  Noncontributory  Meds/Allergies  reviewed      No Known Allergies    Objective     No intake or output data in the 24 hours ending 07/14/18 1517    Invasive Devices:        Physical Exam   Constitutional: She is oriented to person, place, and time  She appears well-developed  HENT:   Head: Normocephalic  Eyes: No scleral icterus  Neck: Normal range of motion  Cardiovascular: Normal rate  Pulmonary/Chest: Effort normal    Abdominal: Soft  She exhibits no distension   There is no tenderness  There is no rebound and no guarding  Musculoskeletal: Normal range of motion  Neurological: She is alert and oriented to person, place, and time  Skin: Skin is warm and dry  Psychiatric: She has a normal mood and affect  Lab Results: I have personally reviewed pertinent reports  Imaging Studies: I have personally reviewed pertinent reports  EKG, Pathology, and Other Studies: I have personally reviewed pertinent reports

## 2018-07-15 LAB
ANION GAP SERPL CALCULATED.3IONS-SCNC: 8 MMOL/L (ref 4–13)
BASOPHILS # BLD AUTO: 0.01 THOUSANDS/ΜL (ref 0–0.1)
BASOPHILS NFR BLD AUTO: 0 % (ref 0–1)
BUN SERPL-MCNC: 10 MG/DL (ref 5–25)
CALCIUM SERPL-MCNC: 8.1 MG/DL (ref 8.3–10.1)
CHLORIDE SERPL-SCNC: 106 MMOL/L (ref 100–108)
CO2 SERPL-SCNC: 24 MMOL/L (ref 21–32)
CREAT SERPL-MCNC: 0.67 MG/DL (ref 0.6–1.3)
EOSINOPHIL # BLD AUTO: 0.01 THOUSAND/ΜL (ref 0–0.61)
EOSINOPHIL NFR BLD AUTO: 0 % (ref 0–6)
ERYTHROCYTE [DISTWIDTH] IN BLOOD BY AUTOMATED COUNT: 13 % (ref 11.6–15.1)
GFR SERPL CREATININE-BSD FRML MDRD: 83 ML/MIN/1.73SQ M
GLUCOSE SERPL-MCNC: 120 MG/DL (ref 65–140)
HCT VFR BLD AUTO: 31.4 % (ref 34.8–46.1)
HGB BLD-MCNC: 10.6 G/DL (ref 11.5–15.4)
IMM GRANULOCYTES # BLD AUTO: 0.04 THOUSAND/UL (ref 0–0.2)
IMM GRANULOCYTES NFR BLD AUTO: 1 % (ref 0–2)
LYMPHOCYTES # BLD AUTO: 1.48 THOUSANDS/ΜL (ref 0.6–4.47)
LYMPHOCYTES NFR BLD AUTO: 18 % (ref 14–44)
MCH RBC QN AUTO: 30.8 PG (ref 26.8–34.3)
MCHC RBC AUTO-ENTMCNC: 33.8 G/DL (ref 31.4–37.4)
MCV RBC AUTO: 91 FL (ref 82–98)
MONOCYTES # BLD AUTO: 1.12 THOUSAND/ΜL (ref 0.17–1.22)
MONOCYTES NFR BLD AUTO: 14 % (ref 4–12)
NEUTROPHILS # BLD AUTO: 5.38 THOUSANDS/ΜL (ref 1.85–7.62)
NEUTS SEG NFR BLD AUTO: 67 % (ref 43–75)
NRBC BLD AUTO-RTO: 0 /100 WBCS
PLATELET # BLD AUTO: 253 THOUSANDS/UL (ref 149–390)
PMV BLD AUTO: 9.1 FL (ref 8.9–12.7)
POTASSIUM SERPL-SCNC: 2.7 MMOL/L (ref 3.5–5.3)
RBC # BLD AUTO: 3.44 MILLION/UL (ref 3.81–5.12)
SODIUM SERPL-SCNC: 138 MMOL/L (ref 136–145)
WBC # BLD AUTO: 8.04 THOUSAND/UL (ref 4.31–10.16)

## 2018-07-15 PROCEDURE — 80048 BASIC METABOLIC PNL TOTAL CA: CPT | Performed by: INTERNAL MEDICINE

## 2018-07-15 PROCEDURE — C9113 INJ PANTOPRAZOLE SODIUM, VIA: HCPCS | Performed by: INTERNAL MEDICINE

## 2018-07-15 PROCEDURE — 85025 COMPLETE CBC W/AUTO DIFF WBC: CPT | Performed by: INTERNAL MEDICINE

## 2018-07-15 RX ORDER — POTASSIUM CHLORIDE 14.9 MG/ML
20 INJECTION INTRAVENOUS ONCE
Status: COMPLETED | OUTPATIENT
Start: 2018-07-15 | End: 2018-07-15

## 2018-07-15 RX ORDER — POTASSIUM CHLORIDE 29.8 MG/ML
40 INJECTION INTRAVENOUS ONCE
Status: DISCONTINUED | OUTPATIENT
Start: 2018-07-15 | End: 2018-07-15 | Stop reason: CLARIF

## 2018-07-15 RX ADMIN — FLUTICASONE PROPIONATE 1 SPRAY: 50 SPRAY, METERED NASAL at 17:33

## 2018-07-15 RX ADMIN — PREDNISOLONE ACETATE 1 DROP: 10 SUSPENSION/ DROPS OPHTHALMIC at 11:10

## 2018-07-15 RX ADMIN — DEXTROSE, SODIUM CHLORIDE, AND POTASSIUM CHLORIDE 75 ML/HR: 5; .9; .15 INJECTION INTRAVENOUS at 06:32

## 2018-07-15 RX ADMIN — ATORVASTATIN CALCIUM 20 MG: 20 TABLET, FILM COATED ORAL at 17:33

## 2018-07-15 RX ADMIN — OFLOXACIN 1 DROP: 3 SOLUTION/ DROPS OPHTHALMIC at 17:31

## 2018-07-15 RX ADMIN — POTASSIUM CHLORIDE 20 MEQ: 200 INJECTION, SOLUTION INTRAVENOUS at 14:47

## 2018-07-15 RX ADMIN — FLUTICASONE PROPIONATE 1 SPRAY: 50 SPRAY, METERED NASAL at 08:57

## 2018-07-15 RX ADMIN — OFLOXACIN 1 DROP: 3 SOLUTION/ DROPS OPHTHALMIC at 08:57

## 2018-07-15 RX ADMIN — CYCLOPENTOLATE HYDROCHLORIDE 1 DROP: 10 SOLUTION/ DROPS OPHTHALMIC at 08:57

## 2018-07-15 RX ADMIN — CYCLOPENTOLATE HYDROCHLORIDE 1 DROP: 10 SOLUTION/ DROPS OPHTHALMIC at 17:31

## 2018-07-15 RX ADMIN — OFLOXACIN 1 DROP: 3 SOLUTION/ DROPS OPHTHALMIC at 11:10

## 2018-07-15 RX ADMIN — PREDNISOLONE ACETATE 1 DROP: 10 SUSPENSION/ DROPS OPHTHALMIC at 21:51

## 2018-07-15 RX ADMIN — PREDNISOLONE ACETATE 1 DROP: 10 SUSPENSION/ DROPS OPHTHALMIC at 08:57

## 2018-07-15 RX ADMIN — PANTOPRAZOLE SODIUM 40 MG: 40 INJECTION, POWDER, FOR SOLUTION INTRAVENOUS at 21:50

## 2018-07-15 RX ADMIN — CYCLOPENTOLATE HYDROCHLORIDE 1 DROP: 10 SOLUTION/ DROPS OPHTHALMIC at 21:50

## 2018-07-15 RX ADMIN — POTASSIUM CHLORIDE 20 MEQ: 200 INJECTION, SOLUTION INTRAVENOUS at 11:09

## 2018-07-15 RX ADMIN — PREDNISOLONE ACETATE 1 DROP: 10 SUSPENSION/ DROPS OPHTHALMIC at 17:31

## 2018-07-15 RX ADMIN — LEVOFLOXACIN 500 MG: 5 INJECTION, SOLUTION INTRAVENOUS at 19:37

## 2018-07-15 RX ADMIN — Medication 1000 MCG: at 08:56

## 2018-07-15 RX ADMIN — PANTOPRAZOLE SODIUM 40 MG: 40 INJECTION, POWDER, FOR SOLUTION INTRAVENOUS at 08:56

## 2018-07-15 RX ADMIN — OFLOXACIN 1 DROP: 3 SOLUTION/ DROPS OPHTHALMIC at 21:51

## 2018-07-15 RX ADMIN — LISINOPRIL 20 MG: 20 TABLET ORAL at 17:33

## 2018-07-15 NOTE — SOCIAL WORK
DASH discussion completed  Discussed goals of making sure pt's needs are met upon discharge, pt's preferences are taken into account, pt understands her health condition, medications and symptoms to watch for after returning home and pt is aware of any follow up appointments recommended by hospital physician  CM spoke with the pt at the bedside  Pt lives alone and has a cane and walker at home  Pt has no other DME or HHC in the home but does have friends that help her with homecare and shopping etc  Pt does drive and she uses [facility] in [place], [place] and [facility] for her medication

## 2018-07-15 NOTE — PROGRESS NOTES
Progress Note - Jason Lake 82 y o  female MRN: [id number]    Unit/Bed#: [address] [place] [address] Encounter: [id number]        Subjective:   Patient is still not feeling well  Does not feel like eating she is on clear liquids feels weak tired no nausea vomiting no loose bowel movements  Vital signs noted patient is afebrile  Lab reveals potassium of 2 7            Review of Systems    Objective:     Vitals: Blood pressure 152/68, pulse 70, temperature 97 8 °F (36 6 °C), temperature source Oral, resp  rate 18, height 5' 4" (1 626 m), weight 67 1 kg (148 lb), SpO2 90 %, not currently breastfeeding  ,Body mass index is 25 4 kg/m²        Intake/Output Summary (Last 24 hours) at 07/15/18 1840  Last data filed at 07/15/18 0848   Gross per 24 hour   Intake             1000 ml   Output                0 ml   Net             1000 ml         Current Facility-Administered Medications:     acetaminophen (TYLENOL) tablet 650 mg, 650 mg, Oral, Q6H PRN, Geovani Hollingsworth MD    atorvastatin (LIPITOR) tablet 20 mg, 20 mg, Oral, After Baron Hollingsworth MD, 20 mg at 07/15/18 1733    cyanocobalamin (VITAMIN B-12) tablet 1,000 mcg, 1,000 mcg, Oral, Daily, Geovani Hollingsworth MD, 1,000 mcg at 07/15/18 0856    cyclopentolate (CYCLOGYL) 1 % ophthalmic solution 1 drop, 1 drop, Left Eye, TID, Geovani Hollingsworth MD, 1 drop at 07/15/18 1731    dextrose 5 % and sodium chloride 0 9 % with KCl 20 mEq/L infusion (premix), 75 mL/hr, Intravenous, Continuous, Geovani Hollingsworth MD, Last Rate: 75 mL/hr at 07/15/18 0632, 75 mL/hr at 07/15/18 0632    fluticasone (FLONASE) 50 mcg/act nasal spray 1 spray, 1 spray, Nasal, BID, Geovani Hollingsworth MD, 1 spray at 07/15/18 1733    levofloxacin (LEVAQUIN) IVPB (premix) 500 mg, 500 mg, Intravenous, Q24H, Geovani Hollingsworth MD    lisinopril (ZESTRIL) tablet 20 mg, 20 mg, Oral, Daily, Geovani Hollingsworth MD, 20 mg at 07/15/18 1735    ofloxacin (OCUFLOX) 0 3 % ophthalmic solution 1 drop, 1 drop, Left Eye, 4x Daily, Carmen Burgos MD, 1 drop at 07/15/18 1731    ondansetron (ZOFRAN) injection 4 mg, 4 mg, Intravenous, Q6H PRN, Geovani Hollingsworth MD    pantoprazole (PROTONIX) injection 40 mg, 40 mg, Intravenous, Q12H [address], Geovani Hollingsworth MD, 40 mg at 07/15/18 0856    prednisoLONE acetate (PRED FORTE) 1 % ophthalmic suspension 1 drop, 1 drop, Left Eye, 4x Daily, Geovani Hollingsworth MD, 1 drop at 07/15/18 1731     Physical Exam   Constitutional: She is oriented to person, place, and time  She appears well-nourished  No distress  HENT:   Head: Normocephalic and atraumatic  Eyes: Conjunctivae are normal    Neck: Neck supple  No thyromegaly present  Cardiovascular: Normal rate and regular rhythm  Pulmonary/Chest: Effort normal and breath sounds normal  She has no wheezes  Abdominal: Bowel sounds are normal  There is no rebound and no guarding  Diffuse mild tenderness noted in the abdomen   Musculoskeletal: Normal range of motion  She exhibits no edema, tenderness or deformity  Neurological: She is alert and oriented to person, place, and time  Skin: Skin is dry  Psychiatric: She has a normal mood and affect             Lab, Imaging and culture:     Recent Results (from the past 72 hour(s))   CBC and differential    Collection Time: 07/14/18 10:33 AM   Result Value Ref Range    WBC 8 64 4 31 - 10 16 Thousand/uL    RBC 3 90 3 81 - 5 12 Million/uL    Hemoglobin 12 1 11 5 - 15 4 g/dL    Hematocrit 35 2 34 8 - 46 1 %    MCV 90 82 - 98 fL    MCH 31 0 26 8 - 34 3 pg    MCHC 34 4 31 4 - 37 4 g/dL    RDW 13 0 11 6 - 15 1 %    MPV 9 3 8 9 - 12 7 fL    Platelets 467 179 - 370 Thousands/uL    nRBC 0 /100 WBCs    Neutrophils Relative 79 (H) 43 - 75 %    Immat GRANS % 1 0 - 2 %    Lymphocytes Relative 12 (L) 14 - 44 %    Monocytes Relative 8 4 - 12 %    Eosinophils Relative 0 0 - 6 %    Basophils Relative 0 0 - 1 %    Neutrophils Absolute 6 87 1 85 - 7 62 Thousands/µL    Immature Grans Absolute 0 05 0 00 - 0 20 Thousand/uL Lymphocytes Absolute 1 04 0 60 - 4 47 Thousands/µL    Monocytes Absolute 0 67 0 17 - 1 22 Thousand/µL    Eosinophils Absolute 0 00 0 00 - 0 61 Thousand/µL    Basophils Absolute 0 01 0 00 - 0 10 Thousands/µL   Comprehensive metabolic panel    Collection Time: 07/14/18 10:33 AM   Result Value Ref Range    Sodium 139 136 - 145 mmol/L    Potassium 2 7 (LL) 3 5 - 5 3 mmol/L    Chloride 103 100 - 108 mmol/L    CO2 23 21 - 32 mmol/L    Anion Gap 13 4 - 13 mmol/L    BUN 20 5 - 25 mg/dL    Creatinine 0 68 0 60 - 1 30 mg/dL    Glucose 114 65 - 140 mg/dL    Calcium 8 5 8 3 - 10 1 mg/dL    AST 16 5 - 45 U/L    ALT 18 12 - 78 U/L    Alkaline Phosphatase 62 46 - 116 U/L    Total Protein 7 0 6 4 - 8 2 g/dL    Albumin 3 5 3 5 - 5 0 g/dL    Total Bilirubin 0 50 0 20 - 1 00 mg/dL    eGFR 82 ml/min/1 73sq m   Lipase    Collection Time: 07/14/18 10:33 AM   Result Value Ref Range    Lipase 171 73 - 393 u/L   UA w Reflex to Microscopic w Reflex to Culture    Collection Time: 07/14/18 11:20 AM   Result Value Ref Range    Color, UA Yellow     Clarity, UA Clear     Specific Gravity, UA 1 025 1 000 - 1 030    pH, UA 6 0 5 0 - 9 0    Leukocytes, UA Negative Negative    Nitrite, UA Positive (A) Negative    Protein, UA Negative Negative mg/dl    Glucose, UA Negative Negative mg/dl    Ketones, UA 15 (1+) (A) Negative mg/dl    Urobilinogen, UA 0 2 0 2, 1 0 E U /dl E U /dl    Bilirubin, UA Negative Negative    Blood, UA Moderate (A) Negative   Urine Microscopic    Collection Time: 07/14/18 11:20 AM   Result Value Ref Range    RBC, UA 0-1 (A) None Seen, 0-5 /hpf    WBC, UA 4-10 (A) None Seen, 0-5, 5-55, 5-65 /hpf    Epithelial Cells Occasional None Seen, Occasional /hpf    Bacteria, UA Innumerable (A) None Seen, Occasional /hpf   CBC and differential    Collection Time: 07/14/18  8:52 PM   Result Value Ref Range    WBC 8 56 4 31 - 10 16 Thousand/uL    RBC 3 48 (L) 3 81 - 5 12 Million/uL    Hemoglobin 10 6 (L) 11 5 - 15 4 g/dL    Hematocrit 31 6 (L) 34 8 - 46 1 %    MCV 91 82 - 98 fL    MCH 30 5 26 8 - 34 3 pg    MCHC 33 5 31 4 - 37 4 g/dL    RDW 12 9 11 6 - 15 1 %    MPV 9 1 8 9 - 12 7 fL    Platelets 667 354 - 012 Thousands/uL    nRBC 0 /100 WBCs    Neutrophils Relative 71 43 - 75 %    Immat GRANS % 1 0 - 2 %    Lymphocytes Relative 15 14 - 44 %    Monocytes Relative 13 (H) 4 - 12 %    Eosinophils Relative 0 0 - 6 %    Basophils Relative 0 0 - 1 %    Neutrophils Absolute 6 08 1 85 - 7 62 Thousands/µL    Immature Grans Absolute 0 06 0 00 - 0 20 Thousand/uL    Lymphocytes Absolute 1 30 0 60 - 4 47 Thousands/µL    Monocytes Absolute 1 11 0 17 - 1 22 Thousand/µL    Eosinophils Absolute 0 00 0 00 - 0 61 Thousand/µL    Basophils Absolute 0 01 0 00 - 0 10 Thousands/µL   Basic metabolic panel    Collection Time: 07/15/18  6:35 AM   Result Value Ref Range    Sodium 138 136 - 145 mmol/L    Potassium 2 7 (LL) 3 5 - 5 3 mmol/L    Chloride 106 100 - 108 mmol/L    CO2 24 21 - 32 mmol/L    Anion Gap 8 4 - 13 mmol/L    BUN 10 5 - 25 mg/dL    Creatinine 0 67 0 60 - 1 30 mg/dL    Glucose 120 65 - 140 mg/dL    Calcium 8 1 (L) 8 3 - 10 1 mg/dL    eGFR 83 ml/min/1 73sq m   CBC and differential    Collection Time: 07/15/18  6:35 AM   Result Value Ref Range    WBC 8 04 4 31 - 10 16 Thousand/uL    RBC 3 44 (L) 3 81 - 5 12 Million/uL    Hemoglobin 10 6 (L) 11 5 - 15 4 g/dL    Hematocrit 31 4 (L) 34 8 - 46 1 %    MCV 91 82 - 98 fL    MCH 30 8 26 8 - 34 3 pg    MCHC 33 8 31 4 - 37 4 g/dL    RDW 13 0 11 6 - 15 1 %    MPV 9 1 8 9 - 12 7 fL    Platelets 296 322 - 872 Thousands/uL    nRBC 0 /100 WBCs    Neutrophils Relative 67 43 - 75 %    Immat GRANS % 1 0 - 2 %    Lymphocytes Relative 18 14 - 44 %    Monocytes Relative 14 (H) 4 - 12 %    Eosinophils Relative 0 0 - 6 %    Basophils Relative 0 0 - 1 %    Neutrophils Absolute 5 38 1 85 - 7 62 Thousands/µL    Immature Grans Absolute 0 04 0 00 - 0 20 Thousand/uL    Lymphocytes Absolute 1 48 0 60 - 4 47 Thousands/µL    Monocytes Absolute 1 12 0 17 - 1 22 Thousand/µL    Eosinophils Absolute 0 01 0 00 - 0 61 Thousand/µL    Basophils Absolute 0 01 0 00 - 0 10 Thousands/µL       PE Study with CT Abdomen and Pelvis with contrast   Final Result      1  Emphysema  2   No evidence of pulmonary embolus or other acute abnormality in the chest    3   Hiatal hernia and generalized mural thickening of the esophagus, consistent with esophagitis or presbyesophagus  4   No acute abnormality in the abdomen or pelvis  5   Sigmoid diverticulosis without evidence of diverticulitis  Workstation performed: [id number]             Invasive Devices     Peripheral Intravenous Line            Peripheral IV 07/14/18 Left Wrist 1 day                      Assessment:  Patient with scant hematemesis most likely Diane-Cancino tear however she is still not feeling well not eating   Gastroenteritis viral syndrome  Urinary tract infection  History of hypertension  DJD  Hypercholesterolemia  Plan:  Continue with a clear liquid diet  IV fluids  IV antibiotic  PT and OT evaluation and treatment    Will discuss with the gastroenterologist

## 2018-07-16 LAB
ANION GAP SERPL CALCULATED.3IONS-SCNC: 8 MMOL/L (ref 4–13)
BASOPHILS # BLD AUTO: 0.02 THOUSANDS/ΜL (ref 0–0.1)
BASOPHILS NFR BLD AUTO: 0 % (ref 0–1)
BUN SERPL-MCNC: 7 MG/DL (ref 5–25)
CALCIUM SERPL-MCNC: 8.4 MG/DL (ref 8.3–10.1)
CHLORIDE SERPL-SCNC: 102 MMOL/L (ref 100–108)
CO2 SERPL-SCNC: 25 MMOL/L (ref 21–32)
CREAT SERPL-MCNC: 0.82 MG/DL (ref 0.6–1.3)
EOSINOPHIL # BLD AUTO: 0.03 THOUSAND/ΜL (ref 0–0.61)
EOSINOPHIL NFR BLD AUTO: 1 % (ref 0–6)
ERYTHROCYTE [DISTWIDTH] IN BLOOD BY AUTOMATED COUNT: 12.8 % (ref 11.6–15.1)
GFR SERPL CREATININE-BSD FRML MDRD: 67 ML/MIN/1.73SQ M
GLUCOSE SERPL-MCNC: 110 MG/DL (ref 65–140)
HCT VFR BLD AUTO: 33.6 % (ref 34.8–46.1)
HGB BLD-MCNC: 11 G/DL (ref 11.5–15.4)
IMM GRANULOCYTES # BLD AUTO: 0.04 THOUSAND/UL (ref 0–0.2)
IMM GRANULOCYTES NFR BLD AUTO: 1 % (ref 0–2)
LYMPHOCYTES # BLD AUTO: 1.36 THOUSANDS/ΜL (ref 0.6–4.47)
LYMPHOCYTES NFR BLD AUTO: 23 % (ref 14–44)
MAGNESIUM SERPL-MCNC: 1.7 MG/DL (ref 1.6–2.6)
MCH RBC QN AUTO: 30 PG (ref 26.8–34.3)
MCHC RBC AUTO-ENTMCNC: 32.7 G/DL (ref 31.4–37.4)
MCV RBC AUTO: 92 FL (ref 82–98)
MONOCYTES # BLD AUTO: 0.71 THOUSAND/ΜL (ref 0.17–1.22)
MONOCYTES NFR BLD AUTO: 12 % (ref 4–12)
NEUTROPHILS # BLD AUTO: 3.83 THOUSANDS/ΜL (ref 1.85–7.62)
NEUTS SEG NFR BLD AUTO: 63 % (ref 43–75)
NRBC BLD AUTO-RTO: 0 /100 WBCS
PLATELET # BLD AUTO: 301 THOUSANDS/UL (ref 149–390)
PMV BLD AUTO: 9.1 FL (ref 8.9–12.7)
POTASSIUM SERPL-SCNC: 2.9 MMOL/L (ref 3.5–5.3)
RBC # BLD AUTO: 3.67 MILLION/UL (ref 3.81–5.12)
SODIUM SERPL-SCNC: 135 MMOL/L (ref 136–145)
WBC # BLD AUTO: 5.99 THOUSAND/UL (ref 4.31–10.16)

## 2018-07-16 PROCEDURE — 83735 ASSAY OF MAGNESIUM: CPT | Performed by: INTERNAL MEDICINE

## 2018-07-16 PROCEDURE — G8979 MOBILITY GOAL STATUS: HCPCS

## 2018-07-16 PROCEDURE — 85025 COMPLETE CBC W/AUTO DIFF WBC: CPT | Performed by: INTERNAL MEDICINE

## 2018-07-16 PROCEDURE — 97163 PT EVAL HIGH COMPLEX 45 MIN: CPT

## 2018-07-16 PROCEDURE — 80048 BASIC METABOLIC PNL TOTAL CA: CPT | Performed by: INTERNAL MEDICINE

## 2018-07-16 PROCEDURE — G8978 MOBILITY CURRENT STATUS: HCPCS

## 2018-07-16 PROCEDURE — C9113 INJ PANTOPRAZOLE SODIUM, VIA: HCPCS | Performed by: INTERNAL MEDICINE

## 2018-07-16 RX ORDER — POTASSIUM CHLORIDE 14.9 MG/ML
20 INJECTION INTRAVENOUS ONCE
Status: COMPLETED | OUTPATIENT
Start: 2018-07-16 | End: 2018-07-16

## 2018-07-16 RX ORDER — POTASSIUM CHLORIDE 20 MEQ/1
20 TABLET, EXTENDED RELEASE ORAL DAILY
Status: DISCONTINUED | OUTPATIENT
Start: 2018-07-16 | End: 2018-07-19 | Stop reason: HOSPADM

## 2018-07-16 RX ORDER — POTASSIUM CHLORIDE 29.8 MG/ML
40 INJECTION INTRAVENOUS ONCE
Status: DISCONTINUED | OUTPATIENT
Start: 2018-07-16 | End: 2018-07-16

## 2018-07-16 RX ADMIN — FLUTICASONE PROPIONATE 1 SPRAY: 50 SPRAY, METERED NASAL at 17:49

## 2018-07-16 RX ADMIN — Medication 1000 MCG: at 10:34

## 2018-07-16 RX ADMIN — POTASSIUM CHLORIDE 20 MEQ: 200 INJECTION, SOLUTION INTRAVENOUS at 10:35

## 2018-07-16 RX ADMIN — OFLOXACIN 1 DROP: 3 SOLUTION/ DROPS OPHTHALMIC at 10:38

## 2018-07-16 RX ADMIN — CYCLOPENTOLATE HYDROCHLORIDE 1 DROP: 10 SOLUTION/ DROPS OPHTHALMIC at 10:36

## 2018-07-16 RX ADMIN — POTASSIUM CHLORIDE 20 MEQ: 1500 TABLET, EXTENDED RELEASE ORAL at 10:34

## 2018-07-16 RX ADMIN — POTASSIUM CHLORIDE 20 MEQ: 200 INJECTION, SOLUTION INTRAVENOUS at 12:51

## 2018-07-16 RX ADMIN — PREDNISOLONE ACETATE 1 DROP: 10 SUSPENSION/ DROPS OPHTHALMIC at 17:50

## 2018-07-16 RX ADMIN — LISINOPRIL 20 MG: 20 TABLET ORAL at 17:48

## 2018-07-16 RX ADMIN — ATORVASTATIN CALCIUM 20 MG: 20 TABLET, FILM COATED ORAL at 17:49

## 2018-07-16 RX ADMIN — PREDNISOLONE ACETATE 1 DROP: 10 SUSPENSION/ DROPS OPHTHALMIC at 21:20

## 2018-07-16 RX ADMIN — DEXTROSE, SODIUM CHLORIDE, AND POTASSIUM CHLORIDE 75 ML/HR: 5; .9; .15 INJECTION INTRAVENOUS at 15:08

## 2018-07-16 RX ADMIN — OFLOXACIN 1 DROP: 3 SOLUTION/ DROPS OPHTHALMIC at 12:52

## 2018-07-16 RX ADMIN — FLUTICASONE PROPIONATE 1 SPRAY: 50 SPRAY, METERED NASAL at 10:38

## 2018-07-16 RX ADMIN — LEVOFLOXACIN 500 MG: 5 INJECTION, SOLUTION INTRAVENOUS at 17:49

## 2018-07-16 RX ADMIN — ONDANSETRON 4 MG: 2 INJECTION INTRAMUSCULAR; INTRAVENOUS at 13:32

## 2018-07-16 RX ADMIN — ACETAMINOPHEN 650 MG: 325 TABLET ORAL at 10:37

## 2018-07-16 RX ADMIN — CYCLOPENTOLATE HYDROCHLORIDE 1 DROP: 10 SOLUTION/ DROPS OPHTHALMIC at 21:20

## 2018-07-16 RX ADMIN — PREDNISOLONE ACETATE 1 DROP: 10 SUSPENSION/ DROPS OPHTHALMIC at 09:00

## 2018-07-16 RX ADMIN — PANTOPRAZOLE SODIUM 40 MG: 40 INJECTION, POWDER, FOR SOLUTION INTRAVENOUS at 10:34

## 2018-07-16 RX ADMIN — PREDNISOLONE ACETATE 1 DROP: 10 SUSPENSION/ DROPS OPHTHALMIC at 12:52

## 2018-07-16 RX ADMIN — OFLOXACIN 1 DROP: 3 SOLUTION/ DROPS OPHTHALMIC at 21:20

## 2018-07-16 RX ADMIN — OFLOXACIN 1 DROP: 3 SOLUTION/ DROPS OPHTHALMIC at 17:50

## 2018-07-16 RX ADMIN — ACETAMINOPHEN 650 MG: 325 TABLET ORAL at 03:28

## 2018-07-16 RX ADMIN — PANTOPRAZOLE SODIUM 40 MG: 40 INJECTION, POWDER, FOR SOLUTION INTRAVENOUS at 21:20

## 2018-07-16 RX ADMIN — CYCLOPENTOLATE HYDROCHLORIDE 1 DROP: 10 SOLUTION/ DROPS OPHTHALMIC at 17:50

## 2018-07-16 NOTE — CASE MANAGEMENT
Initial Clinical Review  Admission: Date/Time/Statement: 7/14/18 @ 1301   Orders Placed This Encounter   Procedures    Inpatient Admission (expected length of stay for this patient is greater than two midnights)     Standing Status:   Standing     Number of Occurrences:   1     Order Specific Question:   Admitting Physician     Answer:   Melissa Ghotra     Order Specific Question:   Level of Care     Answer:   Med Surg [16]     Order Specific Question:   Estimated length of stay     Answer:   More than 2 Midnights     Order Specific Question:   Certification     Answer:   I certify that inpatient services are medically necessary for this patient for a duration of greater than two midnights  See H&P and MD Progress Notes for additional information about the patient's course of treatment  ED: Date/Time/Mode of Arrival:   ED Arrival Information     Expected Arrival Acuity Means of Arrival Escorted By Service Admission Type    - 7/14/2018 09:29 Urgent Wheelchair Family Member General Medicine Urgent    Arrival Complaint    Weakness, vomiting       Chief Complaint:   Chief Complaint   Patient presents with    Vomiting     pt c/o nausea and vomiting for 2 days, lower abd pain since last night  pt had l eye surgery Wed  Pt has no appetite, not eating according to nephew   Weakness - Generalized   History of Illness:    62-year-old female was brought in by her son for several days of not being able to get out of bed, having episodes of nausea vomiting and diarrhea  Patient had a eye surgery done 2 days ago 1 day prior to the onset of the symptoms  Patient denies any fevers or chills, she does have intermittent lower abdominal pain that she is not sure is associated with anything in particular  Patient states she has no appetite and basic is not eaten anything for several days and only a small amount of water    Patient called her son that she feel well wanted to come to the hospital so son picked her up and brought her here for evaluation  The patient lives alone and nobody in the house to give her any care  Patient denies any chest pain or shortness of breath  ED Vital Signs:   ED Triage Vitals   Temperature Pulse Respirations Blood Pressure SpO2   07/14/18 1114 07/14/18 0940 07/14/18 0940 07/14/18 0940 07/14/18 0940   98 1 °F (36 7 °C) 86 18 (!) 176/88 98 %      Temp Source Heart Rate Source Patient Position - Orthostatic VS BP Location FiO2 (%)   07/14/18 0940 07/14/18 0940 07/14/18 0940 07/14/18 0940 --   Tympanic Monitor Lying Left arm       Pain Score       07/14/18 1330       No Pain        Wt Readings from Last 1 Encounters:   07/14/18 67 1 kg (148 lb)   Vital Signs (abnormal):   /105  T 99 4  Abnormal Labs/Diagnostic Test Results:   K 2 7 HGB 10 6  U/A+NITRITE, KETONES, BLOOD, BACTERIA  CT CHEST, A/P=1  Emphysema  2   No evidence of pulmonary embolus or other acute abnormality in the chest   3   Hiatal hernia and generalized mural thickening of the esophagus, consistent with esophagitis or presbyesophagus  4   No acute abnormality in the abdomen or pelvis  5   Sigmoid diverticulosis without evidence of diverticulitis    ED Treatment:   Medication Administration from 07/14/2018 0929 to 07/14/2018 1359       Date/Time Order Dose Route Action Action by Comments     07/14/2018 1331 sodium chloride 0 9 % bolus 1,000 mL 0 mL Intravenous Lissa Berman RN      07/14/2018 1033 sodium chloride 0 9 % bolus 1,000 mL 1,000 mL Intravenous New Bag Nevaeh Horne RN      07/14/2018 1037 ondansetron (ZOFRAN) injection 4 mg 4 mg Intravenous Given Nevaeh Horne RN      07/14/2018 1130 potassium chloride (K-DUR,KLOR-CON) CR tablet 40 mEq 40 mEq Oral Given Nevaeh Horne RN      07/14/2018 1147 iohexol (OMNIPAQUE) 350 MG/ML injection (MULTI-DOSE) 100 mL 100 mL Intravenous Given Kianna Matthews      07/14/2018 1243 ondansetron (ZOFRAN) injection 4 mg 4 mg Intravenous Given Nevaeh Horne RN 07/14/2018 1246 pantoprazole (PROTONIX) injection 40 mg 40 mg Intravenous Given Beverly Bray RN      07/14/2018 1331 levofloxacin (LEVAQUIN) IVPB (premix) 500 mg 500 mg Intravenous New Jaky Hoffman RN       Past Medical/Surgical History: Active Ambulatory Problems     Diagnosis Date Noted    No Active Ambulatory Problems     Resolved Ambulatory Problems     Diagnosis Date Noted    No Resolved Ambulatory Problems     Past Medical History:   Diagnosis Date    Arthritis     Hyperlipidemia     Hypertension     Urinary tract infection    Admitting Diagnosis: Hypokalemia [E87 6]  Gastroenteritis [K52 9]  UTI (urinary tract infection) [N39 0]  Weakness [R53 1]  GI bleed [K92 2]  Age/Sex: 82 y o  female  Assessment/Plan:   Patient with scant hematemesis most likely Diane-Cancino tear doubt ulcers however she is on aspirin which may be a contributing factor for GI bleed secondary to peptic ulcer disease  Patient did have a melanotic stool about a week ago    Nausea vomiting and diarrhea probably secondary to gastroenteritis  Urinary tract infection  Hypertension  Degenerative joint disease  Hypercholesterolemia  Plan:  Clear liquid diet  IV fluids  IV antibiotics  GI consultation  Monitor hemoglobin and hematocrit  Admission Orders:  MED SURG  CONSULT GI  VENODYNES  PT EVAL & TX  Scheduled Meds:   Current Facility-Administered Medications:  acetaminophen 650 mg Oral Q6H PRN Geovani Hollingsworth MD    atorvastatin 20 mg Oral After Dinner Geovani Hollingsworth MD    cyanocobalamin 1,000 mcg Oral Daily Geovani Hollingsworth MD    cyclopentolate 1 drop Left Eye TID Geovani Hollingsworth MD    dextrose 5 % and sodium chloride 0 9 % with KCl 20 mEq/L 75 mL/hr Intravenous Continuous Geovani Hollingsworth MD Last Rate: 75 mL/hr (07/15/18 2941)   fluticasone 1 spray Nasal BID Geovani Hollingsworth MD    levofloxacin 500 mg Intravenous Q24H Geovani Hollingsworth MD Last Rate: 500 mg (07/1937)   lisinopril 20 mg Oral Daily Alysa Morrison MD ofloxacin 1 drop Left Eye 4x Daily Geovani Hollingsworth MD    ondansetron 4 mg Intravenous Q6H PRN Geovani Hollingsworth MD    pantoprazole 40 mg Intravenous Q12H [facility] Geovani Hollingsworth MD    potassium chloride 20 mEq Oral Daily Geovani Hollingsworth MD    prednisoLONE acetate 1 drop Left Eye 4x Daily Geovani Hollingsworth MD      Continuous Infusions:   dextrose 5 % and sodium chloride 0 9 % with KCl 20 mEq/L 75 mL/hr Last Rate: 75 mL/hr (07/15/18 0369)     PRN Meds:   acetaminophen    ondansetron

## 2018-07-16 NOTE — SOCIAL WORK
CM spoke with the pt at the bedside about a DCP of STR  This was recommended by PT/OT and by the PCP and explained to the pt that this may be a safer DCP for her prior to her returning to home to assure that she can be independent for her own ADL's  Pt indicated that she does not like the idea of a [address] by acknowledged that she is rather weak and may need more therapies  Encouraged pt to review the Facility List and discuss with her PCP and family to determine if this is the best DCP for her  Pt agreed to speak with her PCP re: this

## 2018-07-16 NOTE — PROGRESS NOTES
Progress Note - Ivette Abad 82 y o  female MRN: [id number]    Unit/Bed#: [address] [place] [address] Encounter: [id number]      Subjective:   Patient seen and examined at bedside, no acute distress  She reports left lower abdominal pain about 5/10 comes and goes no noted aggravating or relieving factors  She is passing flatus no bowel movement  Denies any fever chills chest pain shortness of breath nausea vomiting gas bloating reflux urinary urgency frequency dark urine new joint pain bruising or rashes  Objective:     Vitals: Blood pressure (!) 172/81, pulse 75, temperature 98 6 °F (37 °C), temperature source Oral, resp  rate 16, height 5' 4" (1 626 m), weight 67 1 kg (148 lb), SpO2 93 %, not currently breastfeeding  ,Body mass index is 25 4 kg/m²      No intake or output data in the 24 hours ending 07/16/18 1237    Gastrointestinal ROS: Left lower abdominal pain, no change in bowel habits, or black or bloody stools      Physical:   General well appearing no acute distress   Abdomen: soft non tender to touch bowel sounds + x 4 quads no guarding or rebound tenderness                   Current Facility-Administered Medications:     acetaminophen (TYLENOL) tablet 650 mg, 650 mg, Oral, Q6H PRN, Geovani Hollingsworth MD, 650 mg at 07/16/18 1037    atorvastatin (LIPITOR) tablet 20 mg, 20 mg, Oral, After Monisha Hollingsworth MD, 20 mg at 07/15/18 1733    cyanocobalamin (VITAMIN B-12) tablet 1,000 mcg, 1,000 mcg, Oral, Daily, Geovani Hollingsworth MD, 1,000 mcg at 07/16/18 1034    cyclopentolate (CYCLOGYL) 1 % ophthalmic solution 1 drop, 1 drop, Left Eye, TID, Geovani Hollingsworth MD, 1 drop at 07/16/18 1036    dextrose 5 % and sodium chloride 0 9 % with KCl 20 mEq/L infusion (premix), 75 mL/hr, Intravenous, Continuous, Geovani Hollingsworth MD, Last Rate: 75 mL/hr at 07/15/18 0632, 75 mL/hr at 07/15/18 0632    fluticasone (FLONASE) 50 mcg/act nasal spray 1 spray, 1 spray, Nasal, BID, Geovani Hollingsworth MD, 1 spray at 07/16/18 1038    levofloxacin (LEVAQUIN) IVPB (premix) 500 mg, 500 mg, Intravenous, Q24H, Geovani Hollingsworth MD, Last Rate: 100 mL/hr at 07/1937, 500 mg at 07/1937    lisinopril (ZESTRIL) tablet 20 mg, 20 mg, Oral, Daily, Geovani Hollingsworth MD, 20 mg at 07/15/18 1733    ofloxacin (OCUFLOX) 0 3 % ophthalmic solution 1 drop, 1 drop, Left Eye, 4x Daily, Geovani Hollingsworth MD, 1 drop at 07/16/18 1038    ondansetron (ZOFRAN) injection 4 mg, 4 mg, Intravenous, Q6H PRN, Geovani Hollingsworth MD    pantoprazole (PROTONIX) injection 40 mg, 40 mg, Intravenous, Q12H [facility], Geovani Hollingsworth MD, 40 mg at 07/16/18 1034    potassium chloride (K-DUR,KLOR-CON) CR tablet 20 mEq, 20 mEq, Oral, Daily, Geovani Hollingsworth MD    potassium chloride 20 mEq IVPB (premix), 20 mEq, Intravenous, Once, Last Rate: 50 mL/hr at 07/16/18 1039, 20 mEq at 07/16/18 1039 **FOLLOWED BY** [COMPLETED] potassium chloride 20 mEq IVPB (premix), 20 mEq, Intravenous, Once, Geovani Hollingsworth MD, Last Rate: 50 mL/hr at 07/16/18 1035, 20 mEq at 07/16/18 1035    prednisoLONE acetate (PRED FORTE) 1 % ophthalmic suspension 1 drop, 1 drop, Left Eye, 4x Daily, Geovani Hollingsworth MD, 1 drop at 07/16/18 0900    Lab, Imaging and other studies:    Lab Results   Component Value Date    WBC 5 99 07/16/2018    HGB 11 0 (L) 07/16/2018    HCT 33 6 (L) 07/16/2018    MCV 92 07/16/2018     07/16/2018                                                                Lab Results   Component Value Date    GLUCOSE 110 07/16/2018    CALCIUM 8 4 07/16/2018     (L) 07/16/2018    K 2 9 (L) 07/16/2018    CO2 25 07/16/2018     07/16/2018    BUN 7 07/16/2018    CREATININE 0 82 07/16/2018       Lab Results   Component Value Date    ALT 18 07/14/2018    AST 16 07/14/2018    ALKPHOS 62 07/14/2018    BILITOT 0 50 07/14/2018           Assessment/Plan:  1  Hematemesis melena- hemoglobin is improving no further nausea or hematemesis this could have been due to [employer] tear    No s/s overt GI bleeding diet to be advanced today  Discussed with nephlenny at bedside they prefer no GI intervention unless necessary  If need be can consider EGD as outpatient as she has never had EGD  2  Nausea vomiting diarrhea now resolved likely viral  3  Left lower abdominal pain this could be due to spasms or inflammatory, she had colonoscopy in march of last year with noted diverticulosis  CT with no evidence of acute diverticulitis  She is on Levaquin continue same  4  Hypokalemia management per medicine    Above case discussed with  Dr Patsy Loza all bull decisions made by him

## 2018-07-16 NOTE — SOCIAL WORK
DARLEEN spoke with the pt's nephew, Mei Mini  He prefers to be the first contact as he is trying to work on care for the pt in the home and her living will and poa for future decisions as needed  Pt has been recommended for STR by PT/OT and nephew thinks this would be best for her while he works on in home assistance  Per the nephew, would like DARLEEN to discuss this option with her for her to understand why she would need to go and feels it would be best received by the medical team   DARLEEN will discuss this option with the pt for poss DCP needs with STR

## 2018-07-16 NOTE — PHYSICAL THERAPY NOTE
PT EVALUATION     07/16/18 1155   Pain Assessment   Pain Assessment 0-10   Pain Score 5  (abdominal area)   Home Living   Type of [facility] Two level; Able to live on main level with bedroom/bathroom  (no stairs to enter)   Home Equipment Cane;Walker   Additional Comments patient reports ambulating without assistive device prior to admission   Prior Function   Level of [place] Independent with ADLs and functional mobility   Lives With Alone   ADL Assistance Independent   IADLs Independent   Comments patient independent with all ADLs and IADLs , drives, cooks and food shops, even cuts her own lawn as per patient   Restrictions/Precautions   Other Precautions Chair Alarm; Bed Alarm; Fall Risk;Pain;Hard of hearing   General   Additional Pertinent History chart reviewed, patient admitted with UTI, GI bleed, now presents with abdominal pain  Patient completely independent prior to admission without outside assistance as per patient   Family/Caregiver Present No   Cognition   Overall Cognitive Status WFL   Arousal/Participation Cooperative   Orientation Level Oriented X4   Following Commands Follows multistep commands with increased time or repetition   Comments patient [place]   RLE Assessment   RLE Assessment (ROM WFL, strength BLEs 3+/5)   LLE Assessment   LLE Assessment (ROM WFL, strength 3+/5)   Coordination   Movements are Fluid and Coordinated 0   Bed Mobility   Supine to Sit 4  Minimal assistance   Additional items Assist x 1   Sit to Supine 4  Minimal assistance   Additional items Assist x 1   Transfers   Sit to Stand 4  Minimal assistance   Additional items Assist x 1   Stand to Sit 4  Minimal assistance   Additional items Assist x 1   Ambulation/Elevation   Gait Assistance 4  Minimal assist   Additional items Assist x 1;Verbal cues; Tactile cues   Assistive Device Rolling walker   Distance 50 feet with change in direction, first gait without assistive device with min to mod assist of 1, patient reaching for furniture/wall, therefore roller walker utilized with improved balance and endurance   Balance   Static Sitting Fair +   Dynamic Sitting Fair -   Static Standing Fair -   Dynamic Standing Poor   Ambulatory Poor   Activity Tolerance   Activity Tolerance Patient limited by fatigue;Patient limited by pain   Nurse Made Aware yes   Assessment   Prognosis Good   Problem List Decreased strength;Decreased range of motion;Decreased endurance; Impaired balance;Decreased mobility; Decreased coordination;Pain; Impaired hearing   Assessment Patient seen for Physical Therapy evaluation  Patient admitted with GI bleed  Comorbidities affecting patient's physical performance include: arthritis, hypertension, hyperlipidemia and UTI  Personal factors affecting patient at time of initial evaluation include: lives in two story house, inability to ambulate household distances, inability to navigate community distances, inability to navigate level surfaces without external assistance, inability to perform dynamic tasks in community, limited home support, inability to perform physical activity, inability to perform ADLS and inability to perform IADLS   Prior to admission, patient was independent with functional mobility without assistive device, independent with ADLS, independent with IADLS, ambulating household distance, ambulating community distances and lives in a multilevel house but has 1st floor setup  Please find objective findings from Physical Therapy assessment regarding body systems outlined above with impairments and limitations including weakness, decreased ROM, impaired balance, decreased endurance, impaired coordination, gait deviations, pain, decreased activity tolerance, decreased functional mobility tolerance and fall risk  The Barthel Index was used as a functional outcome tool presenting with a score of 40 today indicating marked limitations of functional mobility and ADLS    Patient's clinical presentation is currently unstable/unpredictable as seen in patient's presentation of vital sign response, changing level of pain, increased fall risk, new onset of impairment of functional mobility, decreased endurance and new onset of weakness  Pt would benefit from continued Physical Therapy treatment to address deficits as defined above and maximize level of functional mobility  As demonstrated by objective findings, the assigned level of complexity for this evaluation is high  Goals   Patient Goals get stronger, continue to be independent   STG Expiration Date 07/23/18   Short Term Goal #1 transfers and gait with roller walker with supervision   Short Term Goal #2 gait endurance to 120 feet, strength BLEs 3+/4-   LTG Expiration Date 07/30/18   Long Term Goal #1 transfers and gait with roller walker independently    Long Term Goal #2 gait endurance to functional community distances, strength BLEs 4-/5   Treatment Day 0   Plan   Treatment/Interventions ADL retraining;Functional transfer training;LE strengthening/ROM; Therapeutic exercise;Elevations; Endurance training;Patient/family training;Equipment eval/education; Bed mobility;Gait training; Compensatory technique education   PT Frequency 5x/wk   Recommendation   Recommendation (STR)   Barthel Index   Feeding 5   Bathing 0   Grooming Score 0   Dressing Score 5   Bladder Score 5   Bowels Score 10   Toilet Use Score 5   Transfers (Bed/Chair) Score 10   Mobility (Level Surface) Score 0   Stairs Score 0   Barthel Index Score 36   Licensure   [place] License Number  Delen Adrianne PT [id number]

## 2018-07-16 NOTE — PROGRESS NOTES
Progress Note - Alex Pena 82 y o  female MRN: [id number]    Unit/Bed#: [address] [place] [address] Encounter: [id number]        Subjective:   Chart reviewed patient evaluated  Patient appetite is still poor  No nausea vomiting denies abdominal pain  It appears urine culture was not sent from the emergency room  GI and note appreciated  Review of Systems    Objective:     Vitals: Blood pressure 163/70, pulse 77, temperature 98 6 °F (37 °C), temperature source Oral, resp  rate 16, height 5' 4" (1 626 m), weight 67 1 kg (148 lb), SpO2 96 %, not currently breastfeeding  ,Body mass index is 25 4 kg/m²        Intake/Output Summary (Last 24 hours) at 07/16/18 1931  Last data filed at 07/16/18 1500   Gross per 24 hour   Intake                0 ml   Output              200 ml   Net             -200 ml         Current Facility-Administered Medications:     acetaminophen (TYLENOL) tablet 650 mg, 650 mg, Oral, Q6H PRN, Geovani Hollingsworth MD, 650 mg at 07/16/18 1037    atorvastatin (LIPITOR) tablet 20 mg, 20 mg, Oral, After Nic Hollingsworth MD, 20 mg at 07/16/18 1749    cyanocobalamin (VITAMIN B-12) tablet 1,000 mcg, 1,000 mcg, Oral, Daily, Geovani Hollingsworth MD, 1,000 mcg at 07/16/18 1034    cyclopentolate (CYCLOGYL) 1 % ophthalmic solution 1 drop, 1 drop, Left Eye, TID, Geovani Hollingsworth MD, 1 drop at 07/16/18 1750    dextrose 5 % and sodium chloride 0 9 % with KCl 20 mEq/L infusion (premix), 75 mL/hr, Intravenous, Continuous, Geovani Hollingsworth MD, Last Rate: 75 mL/hr at 07/16/18 1508, 75 mL/hr at 07/16/18 1508    fluticasone (FLONASE) 50 mcg/act nasal spray 1 spray, 1 spray, Nasal, BID, Geovani Hollingsworth MD, 1 spray at 07/16/18 1749    levofloxacin (LEVAQUIN) IVPB (premix) 500 mg, 500 mg, Intravenous, Q24H, Geovani Hollingsworth MD, Last Rate: 100 mL/hr at 07/16/18 1749, 500 mg at 07/16/18 1749    lisinopril (ZESTRIL) tablet 20 mg, 20 mg, Oral, Daily, Geovani Hollingsworth MD, 20 mg at 07/16/18 1748    ofloxacin (OCUFLOX) 0 3 % ophthalmic solution 1 drop, 1 drop, Left Eye, 4x Daily, Geovani Hollingsworth MD, 1 drop at 07/16/18 1750    ondansetron (ZOFRAN) injection 4 mg, 4 mg, Intravenous, Q6H PRN, Geovani Hollingsworth MD, 4 mg at 07/16/18 1332    pantoprazole (PROTONIX) injection 40 mg, 40 mg, Intravenous, Q12H MAMIE, Geovani Hollingsworth MD, 40 mg at 07/16/18 1034    potassium chloride (K-DUR,KLOR-CON) CR tablet 20 mEq, 20 mEq, Oral, Daily, Geovani Hollingsworth MD, 20 mEq at 07/16/18 1034    prednisoLONE acetate (PRED FORTE) 1 % ophthalmic suspension 1 drop, 1 drop, Left Eye, 4x Daily, Geovani Hollingsworth MD, 1 drop at 07/16/18 1750     Physical Exam   Constitutional: She is oriented to person, place, and time  She appears well-nourished  No distress  HENT:   Head: Normocephalic and atraumatic  Eyes: Conjunctivae are normal    Neck: Neck supple  No thyromegaly present  Cardiovascular: Normal rate and regular rhythm  Pulmonary/Chest: Effort normal and breath sounds normal  She has no wheezes  Abdominal: Bowel sounds are normal  There is no rebound and no guarding  Diffuse mild tenderness noted in the abdomen   Musculoskeletal: Normal range of motion  She exhibits no edema, tenderness or deformity  Neurological: She is alert and oriented to person, place, and time  Skin: Skin is dry  Psychiatric: She has a normal mood and affect             Lab, Imaging and culture:     Recent Results (from the past 72 hour(s))   CBC and differential    Collection Time: 07/14/18 10:33 AM   Result Value Ref Range    WBC 8 64 4 31 - 10 16 Thousand/uL    RBC 3 90 3 81 - 5 12 Million/uL    Hemoglobin 12 1 11 5 - 15 4 g/dL    Hematocrit 35 2 34 8 - 46 1 %    MCV 90 82 - 98 fL    MCH 31 0 26 8 - 34 3 pg    MCHC 34 4 31 4 - 37 4 g/dL    RDW 13 0 11 6 - 15 1 %    MPV 9 3 8 9 - 12 7 fL    Platelets 041 301 - 668 Thousands/uL    nRBC 0 /100 WBCs    Neutrophils Relative 79 (H) 43 - 75 %    Immat GRANS % 1 0 - 2 %    Lymphocytes Relative 12 (L) 14 - 44 %    Monocytes Relative 8 4 - 12 %    Eosinophils Relative 0 0 - 6 %    Basophils Relative 0 0 - 1 %    Neutrophils Absolute 6 87 1 85 - 7 62 Thousands/µL    Immature Grans Absolute 0 05 0 00 - 0 20 Thousand/uL    Lymphocytes Absolute 1 04 0 60 - 4 47 Thousands/µL    Monocytes Absolute 0 67 0 17 - 1 22 Thousand/µL    Eosinophils Absolute 0 00 0 00 - 0 61 Thousand/µL    Basophils Absolute 0 01 0 00 - 0 10 Thousands/µL   Comprehensive metabolic panel    Collection Time: 07/14/18 10:33 AM   Result Value Ref Range    Sodium 139 136 - 145 mmol/L    Potassium 2 7 (LL) 3 5 - 5 3 mmol/L    Chloride 103 100 - 108 mmol/L    CO2 23 21 - 32 mmol/L    Anion Gap 13 4 - 13 mmol/L    BUN 20 5 - 25 mg/dL    Creatinine 0 68 0 60 - 1 30 mg/dL    Glucose 114 65 - 140 mg/dL    Calcium 8 5 8 3 - 10 1 mg/dL    AST 16 5 - 45 U/L    ALT 18 12 - 78 U/L    Alkaline Phosphatase 62 46 - 116 U/L    Total Protein 7 0 6 4 - 8 2 g/dL    Albumin 3 5 3 5 - 5 0 g/dL    Total Bilirubin 0 50 0 20 - 1 00 mg/dL    eGFR 82 ml/min/1 73sq m   Lipase    Collection Time: 07/14/18 10:33 AM   Result Value Ref Range    Lipase 171 73 - 393 u/L   UA w Reflex to Microscopic w Reflex to Culture    Collection Time: 07/14/18 11:20 AM   Result Value Ref Range    Color, UA Yellow     Clarity, UA Clear     Specific Gravity, UA 1 025 1 000 - 1 030    pH, UA 6 0 5 0 - 9 0    Leukocytes, UA Negative Negative    Nitrite, UA Positive (A) Negative    Protein, UA Negative Negative mg/dl    Glucose, UA Negative Negative mg/dl    Ketones, UA 15 (1+) (A) Negative mg/dl    Urobilinogen, UA 0 2 0 2, 1 0 E U /dl E U /dl    Bilirubin, UA Negative Negative    Blood, UA Moderate (A) Negative   Urine Microscopic    Collection Time: 07/14/18 11:20 AM   Result Value Ref Range    RBC, UA 0-1 (A) None Seen, 0-5 /hpf    WBC, UA 4-10 (A) None Seen, 0-5, 5-55, 5-65 /hpf    Epithelial Cells Occasional None Seen, Occasional /hpf    Bacteria, UA Innumerable (A) None Seen, Occasional /hpf   CBC and differential    Collection Time: 07/14/18  8:52 PM   Result Value Ref Range    WBC 8 56 4 31 - 10 16 Thousand/uL    RBC 3 48 (L) 3 81 - 5 12 Million/uL    Hemoglobin 10 6 (L) 11 5 - 15 4 g/dL    Hematocrit 31 6 (L) 34 8 - 46 1 %    MCV 91 82 - 98 fL    MCH 30 5 26 8 - 34 3 pg    MCHC 33 5 31 4 - 37 4 g/dL    RDW 12 9 11 6 - 15 1 %    MPV 9 1 8 9 - 12 7 fL    Platelets 397 335 - 752 Thousands/uL    nRBC 0 /100 WBCs    Neutrophils Relative 71 43 - 75 %    Immat GRANS % 1 0 - 2 %    Lymphocytes Relative 15 14 - 44 %    Monocytes Relative 13 (H) 4 - 12 %    Eosinophils Relative 0 0 - 6 %    Basophils Relative 0 0 - 1 %    Neutrophils Absolute 6 08 1 85 - 7 62 Thousands/µL    Immature Grans Absolute 0 06 0 00 - 0 20 Thousand/uL    Lymphocytes Absolute 1 30 0 60 - 4 47 Thousands/µL    Monocytes Absolute 1 11 0 17 - 1 22 Thousand/µL    Eosinophils Absolute 0 00 0 00 - 0 61 Thousand/µL    Basophils Absolute 0 01 0 00 - 0 10 Thousands/µL   Basic metabolic panel    Collection Time: 07/15/18  6:35 AM   Result Value Ref Range    Sodium 138 136 - 145 mmol/L    Potassium 2 7 (LL) 3 5 - 5 3 mmol/L    Chloride 106 100 - 108 mmol/L    CO2 24 21 - 32 mmol/L    Anion Gap 8 4 - 13 mmol/L    BUN 10 5 - 25 mg/dL    Creatinine 0 67 0 60 - 1 30 mg/dL    Glucose 120 65 - 140 mg/dL    Calcium 8 1 (L) 8 3 - 10 1 mg/dL    eGFR 83 ml/min/1 73sq m   CBC and differential    Collection Time: 07/15/18  6:35 AM   Result Value Ref Range    WBC 8 04 4 31 - 10 16 Thousand/uL    RBC 3 44 (L) 3 81 - 5 12 Million/uL    Hemoglobin 10 6 (L) 11 5 - 15 4 g/dL    Hematocrit 31 4 (L) 34 8 - 46 1 %    MCV 91 82 - 98 fL    MCH 30 8 26 8 - 34 3 pg    MCHC 33 8 31 4 - 37 4 g/dL    RDW 13 0 11 6 - 15 1 %    MPV 9 1 8 9 - 12 7 fL    Platelets 917 030 - 376 Thousands/uL    nRBC 0 /100 WBCs    Neutrophils Relative 67 43 - 75 %    Immat GRANS % 1 0 - 2 %    Lymphocytes Relative 18 14 - 44 %    Monocytes Relative 14 (H) 4 - 12 %    Eosinophils Relative 0 0 - 6 % Basophils Relative 0 0 - 1 %    Neutrophils Absolute 5 38 1 85 - 7 62 Thousands/µL    Immature Grans Absolute 0 04 0 00 - 0 20 Thousand/uL    Lymphocytes Absolute 1 48 0 60 - 4 47 Thousands/µL    Monocytes Absolute 1 12 0 17 - 1 22 Thousand/µL    Eosinophils Absolute 0 01 0 00 - 0 61 Thousand/µL    Basophils Absolute 0 01 0 00 - 0 10 Thousands/µL   Basic metabolic panel    Collection Time: 07/16/18  5:22 AM   Result Value Ref Range    Sodium 135 (L) 136 - 145 mmol/L    Potassium 2 9 (L) 3 5 - 5 3 mmol/L    Chloride 102 100 - 108 mmol/L    CO2 25 21 - 32 mmol/L    Anion Gap 8 4 - 13 mmol/L    BUN 7 5 - 25 mg/dL    Creatinine 0 82 0 60 - 1 30 mg/dL    Glucose 110 65 - 140 mg/dL    Calcium 8 4 8 3 - 10 1 mg/dL    eGFR 67 ml/min/1 73sq m   Magnesium    Collection Time: 07/16/18  5:22 AM   Result Value Ref Range    Magnesium 1 7 1 6 - 2 6 mg/dL   CBC and differential    Collection Time: 07/16/18  5:22 AM   Result Value Ref Range    WBC 5 99 4 31 - 10 16 Thousand/uL    RBC 3 67 (L) 3 81 - 5 12 Million/uL    Hemoglobin 11 0 (L) 11 5 - 15 4 g/dL    Hematocrit 33 6 (L) 34 8 - 46 1 %    MCV 92 82 - 98 fL    MCH 30 0 26 8 - 34 3 pg    MCHC 32 7 31 4 - 37 4 g/dL    RDW 12 8 11 6 - 15 1 %    MPV 9 1 8 9 - 12 7 fL    Platelets 334 682 - 246 Thousands/uL    nRBC 0 /100 WBCs    Neutrophils Relative 63 43 - 75 %    Immat GRANS % 1 0 - 2 %    Lymphocytes Relative 23 14 - 44 %    Monocytes Relative 12 4 - 12 %    Eosinophils Relative 1 0 - 6 %    Basophils Relative 0 0 - 1 %    Neutrophils Absolute 3 83 1 85 - 7 62 Thousands/µL    Immature Grans Absolute 0 04 0 00 - 0 20 Thousand/uL    Lymphocytes Absolute 1 36 0 60 - 4 47 Thousands/µL    Monocytes Absolute 0 71 0 17 - 1 22 Thousand/µL    Eosinophils Absolute 0 03 0 00 - 0 61 Thousand/µL    Basophils Absolute 0 02 0 00 - 0 10 Thousands/µL       PE Study with CT Abdomen and Pelvis with contrast   Final Result      1  Emphysema     2   No evidence of pulmonary embolus or other acute abnormality in the chest    3   Hiatal hernia and generalized mural thickening of the esophagus, consistent with esophagitis or presbyesophagus  4   No acute abnormality in the abdomen or pelvis  5   Sigmoid diverticulosis without evidence of diverticulitis  Workstation performed: [id number]             Invasive Devices     Peripheral Intravenous Line            Peripheral IV 07/14/18 Left Wrist 2 days    Peripheral IV 07/15/18 Right Wrist 1 day                      Assessment:  Patient with scant hematemesis most likely [place] tear however she is still not feeling well not eating   Gastroenteritis viral syndrome  Urinary tract infection  History of hypertension  DJD  Hypercholesterolemia  Hypokalemia 2 9  Plan:  Advance diet to regular cardiac low sodium  IV fluids with KCl  KCl bolus  BMP tomorrow morning  IV antibiotic  PT and OT evaluation and treatment  Who recommended short-term rehab  Discussed with the patient's nephew at length he also prefers patient be sent for short-term rehab  He discussed with the

## 2018-07-16 NOTE — PHYSICIAN ADVISOR
Current patient class: Inpatient  The patient is currently on Hospital Day: 3 at [address] [place] [address]      The patient was admitted to the hospital at 1301 on 7/14/18 for the following diagnosis:  Hypokalemia [E87 6]  Gastroenteritis [K52 9]  UTI (urinary tract infection) [N39 0]  Weakness [R53 1]  GI bleed [K92 2]       There is documentation in the medical record of an expected length of stay of at least 2 midnights  The patient is therefore expected to satisfy the 2 midnight benchmark and given the 2 midnight presumption is appropriate for INPATIENT ADMISSION  Given this expectation of a satisfying stay, CMS instructs us that the patient is most often appropriate for inpatient admission under part A provided medical necessity is documented in the chart  After review of the relevant documentation, labs, vital signs and test results, the patient is appropriate for INPATIENT ADMISSION  Admission to the hospital as an inpatient is a complex decision making process which requires the practitioner to consider the patients presenting complaint, history and physical examination and all relevant testing  With this in mind, in this case, the patient was deemed appropriate for INPATIENT ADMISSION  After review of the documentation and testing available at the time of the admission I concur with this clinical determination of medical necessity  82year old woman admitted to the hospital for nausea, vomiting and lower abdominal pain  She was noted to have severe hypokalemia  She continues on clear liquid diet and has lost hypokalemia for which she requires treatment  Since she will be hospitalized for more than two midnights, she is appropriate for inpatient status  Rationale is as follows: The patient is a 82 yrs old Female who presented to the ED at 7/14/2018  9:32 AM with a chief complaint of Vomiting (pt c/o nausea and vomiting for 2 days, lower abd pain since last night   pt had l eye surgery Wed  Pt has no appetite, not eating according to nephew  ) and Weakness - Generalized    The patients vitals on arrival were ED Triage Vitals   Temperature Pulse Respirations Blood Pressure SpO2   07/14/18 1114 07/14/18 0940 07/14/18 0940 07/14/18 0940 07/14/18 0940   98 1 °F (36 7 °C) 86 18 (!) 176/88 98 %      Temp Source Heart Rate Source Patient Position - Orthostatic VS BP Location FiO2 (%)   07/14/18 0940 07/14/18 0940 07/14/18 0940 07/14/18 0940 --   Tympanic Monitor Lying Left arm       Pain Score       07/14/18 1330       No Pain           Past Medical History:   Diagnosis Date    Arthritis     Hyperlipidemia     Hypertension     Urinary tract infection      Past Surgical History:   Procedure Laterality Date    CATARACT EXTRACTION      COLONOSCOPY N/A 3/2/2017    Procedure: COLONOSCOPY;  Surgeon: Shena Keller MD;  Location: [facility] GI [facility]; Service:     JOINT REPLACEMENT Bilateral 2011, 2012    hips    REFRACTIVE SURGERY Right            Consults have been placed to:   IP CONSULT TO GASTROENTEROLOGY    Vitals:    07/15/18 2203 07/16/18 0041 07/16/18 0910 07/16/18 1437   BP: (!) 182/80 162/73 (!) 172/81 163/70   BP Location: Left arm Left arm Left arm Left arm   Pulse: 68  75 77   Resp: 18  16 16   Temp: 98 8 °F (37 1 °C)  98 6 °F (37 °C) 98 6 °F (37 °C)   TempSrc: Tympanic  Oral Oral   SpO2: 93%   96%   Weight:       Height:           Most recent labs:    Recent Labs      07/14/18   1033   07/16/18   0522   WBC  8 64   < >  5 99   HGB  12 1   < >  11 0*   HCT  35 2   < >  33 6*   PLT  286   < >  301   K  2 7*   < >  2 9*   NA  139   < >  135*   CALCIUM  8 5   < >  8 4   BUN  20   < >  7   CREATININE  0 68   < >  0 82   LIPASE  171   --    --    AST  16   --    --    ALT  18   --    --    ALKPHOS  62   --    --    BILITOT  0 50   --    --     < > = values in this interval not displayed         Scheduled Meds:  Current Facility-Administered Medications:  acetaminophen 650 mg Oral Q6H PRN Geovani Hollingsworth MD    atorvastatin 20 mg Oral After Dinner Carry MD Prince    cyanocobalamin 1,000 mcg Oral Daily Geovani Hollingsworth MD    cyclopentolate 1 drop Left Eye TID Geovani Hollingsworth MD    dextrose 5 % and sodium chloride 0 9 % with KCl 20 mEq/L 75 mL/hr Intravenous Continuous Geovani Hollingsworth MD Last Rate: 75 mL/hr (07/16/18 1508)   fluticasone 1 spray Nasal BID Geovani Hollingsworth MD    levofloxacin 500 mg Intravenous Q24H Geovani Hollingsworth MD Last Rate: 500 mg (07/1935)   lisinopril 20 mg Oral Daily Geovani Hollingsworth MD    ofloxacin 1 drop Left Eye 4x Daily Geovani Hollingsworth MD    ondansetron 4 mg Intravenous Q6H PRN Geovani Hollingsworth MD    pantoprazole 40 mg Intravenous Q12H [address] Geovani Hollingsworth MD    potassium chloride 20 mEq Oral Daily Geovani Hollingsworth MD    prednisoLONE acetate 1 drop Left Eye 4x Daily Geovani Hollingsworth MD      Continuous Infusions:  dextrose 5 % and sodium chloride 0 9 % with KCl 20 mEq/L 75 mL/hr Last Rate: 75 mL/hr (07/16/18 1508)     PRN Meds:   acetaminophen    ondansetron

## 2018-07-17 LAB
ANION GAP SERPL CALCULATED.3IONS-SCNC: 9 MMOL/L (ref 4–13)
BACTERIA UR QL AUTO: ABNORMAL /HPF
BASOPHILS # BLD AUTO: 0.01 THOUSANDS/ΜL (ref 0–0.1)
BASOPHILS NFR BLD AUTO: 0 % (ref 0–1)
BILIRUB UR QL STRIP: NEGATIVE
BUN SERPL-MCNC: 6 MG/DL (ref 5–25)
CALCIUM SERPL-MCNC: 8 MG/DL (ref 8.3–10.1)
CHLORIDE SERPL-SCNC: 102 MMOL/L (ref 100–108)
CLARITY UR: CLEAR
CO2 SERPL-SCNC: 22 MMOL/L (ref 21–32)
COLOR UR: YELLOW
CREAT SERPL-MCNC: 0.87 MG/DL (ref 0.6–1.3)
EOSINOPHIL # BLD AUTO: 0.07 THOUSAND/ΜL (ref 0–0.61)
EOSINOPHIL NFR BLD AUTO: 1 % (ref 0–6)
ERYTHROCYTE [DISTWIDTH] IN BLOOD BY AUTOMATED COUNT: 13 % (ref 11.6–15.1)
GFR SERPL CREATININE-BSD FRML MDRD: 63 ML/MIN/1.73SQ M
GLUCOSE SERPL-MCNC: 106 MG/DL (ref 65–140)
GLUCOSE UR STRIP-MCNC: NEGATIVE MG/DL
HCT VFR BLD AUTO: 31.7 % (ref 34.8–46.1)
HGB BLD-MCNC: 10.6 G/DL (ref 11.5–15.4)
HGB UR QL STRIP.AUTO: ABNORMAL
IMM GRANULOCYTES # BLD AUTO: 0.03 THOUSAND/UL (ref 0–0.2)
IMM GRANULOCYTES NFR BLD AUTO: 1 % (ref 0–2)
KETONES UR STRIP-MCNC: NEGATIVE MG/DL
LEUKOCYTE ESTERASE UR QL STRIP: NEGATIVE
LYMPHOCYTES # BLD AUTO: 1.73 THOUSANDS/ΜL (ref 0.6–4.47)
LYMPHOCYTES NFR BLD AUTO: 30 % (ref 14–44)
MCH RBC QN AUTO: 30.2 PG (ref 26.8–34.3)
MCHC RBC AUTO-ENTMCNC: 33.4 G/DL (ref 31.4–37.4)
MCV RBC AUTO: 90 FL (ref 82–98)
MONOCYTES # BLD AUTO: 0.87 THOUSAND/ΜL (ref 0.17–1.22)
MONOCYTES NFR BLD AUTO: 15 % (ref 4–12)
NEUTROPHILS # BLD AUTO: 3.09 THOUSANDS/ΜL (ref 1.85–7.62)
NEUTS SEG NFR BLD AUTO: 53 % (ref 43–75)
NITRITE UR QL STRIP: NEGATIVE
NON-SQ EPI CELLS URNS QL MICRO: ABNORMAL /HPF
NRBC BLD AUTO-RTO: 0 /100 WBCS
PH UR STRIP.AUTO: 7 [PH] (ref 5–9)
PLATELET # BLD AUTO: 275 THOUSANDS/UL (ref 149–390)
PMV BLD AUTO: 8.9 FL (ref 8.9–12.7)
POTASSIUM SERPL-SCNC: 3.2 MMOL/L (ref 3.5–5.3)
PROT UR STRIP-MCNC: NEGATIVE MG/DL
RBC # BLD AUTO: 3.51 MILLION/UL (ref 3.81–5.12)
RBC #/AREA URNS AUTO: ABNORMAL /HPF
SODIUM SERPL-SCNC: 133 MMOL/L (ref 136–145)
SP GR UR STRIP.AUTO: 1.02 (ref 1–1.03)
UROBILINOGEN UR QL STRIP.AUTO: 1 E.U./DL
WBC # BLD AUTO: 5.8 THOUSAND/UL (ref 4.31–10.16)
WBC #/AREA URNS AUTO: ABNORMAL /HPF

## 2018-07-17 PROCEDURE — 97167 OT EVAL HIGH COMPLEX 60 MIN: CPT

## 2018-07-17 PROCEDURE — 81001 URINALYSIS AUTO W/SCOPE: CPT | Performed by: INTERNAL MEDICINE

## 2018-07-17 PROCEDURE — G8987 SELF CARE CURRENT STATUS: HCPCS

## 2018-07-17 PROCEDURE — 80048 BASIC METABOLIC PNL TOTAL CA: CPT | Performed by: INTERNAL MEDICINE

## 2018-07-17 PROCEDURE — C9113 INJ PANTOPRAZOLE SODIUM, VIA: HCPCS | Performed by: INTERNAL MEDICINE

## 2018-07-17 PROCEDURE — 85025 COMPLETE CBC W/AUTO DIFF WBC: CPT | Performed by: INTERNAL MEDICINE

## 2018-07-17 PROCEDURE — G8988 SELF CARE GOAL STATUS: HCPCS

## 2018-07-17 RX ORDER — POTASSIUM CHLORIDE 14.9 MG/ML
20 INJECTION INTRAVENOUS ONCE
Status: COMPLETED | OUTPATIENT
Start: 2018-07-17 | End: 2018-07-17

## 2018-07-17 RX ORDER — ACETAMINOPHEN 325 MG/1
650 TABLET ORAL EVERY 6 HOURS PRN
Status: DISCONTINUED | OUTPATIENT
Start: 2018-07-17 | End: 2018-07-19 | Stop reason: HOSPADM

## 2018-07-17 RX ORDER — ACETAMINOPHEN 325 MG/1
650 TABLET ORAL EVERY 6 HOURS PRN
Status: DISCONTINUED | OUTPATIENT
Start: 2018-07-17 | End: 2018-07-17

## 2018-07-17 RX ADMIN — OFLOXACIN 1 DROP: 3 SOLUTION/ DROPS OPHTHALMIC at 12:29

## 2018-07-17 RX ADMIN — POTASSIUM CHLORIDE 20 MEQ: 200 INJECTION, SOLUTION INTRAVENOUS at 08:07

## 2018-07-17 RX ADMIN — OFLOXACIN 1 DROP: 3 SOLUTION/ DROPS OPHTHALMIC at 09:03

## 2018-07-17 RX ADMIN — CYCLOPENTOLATE HYDROCHLORIDE 1 DROP: 10 SOLUTION/ DROPS OPHTHALMIC at 09:03

## 2018-07-17 RX ADMIN — ACETAMINOPHEN 650 MG: 325 TABLET, FILM COATED ORAL at 06:31

## 2018-07-17 RX ADMIN — CYCLOPENTOLATE HYDROCHLORIDE 1 DROP: 10 SOLUTION/ DROPS OPHTHALMIC at 16:29

## 2018-07-17 RX ADMIN — PREDNISOLONE ACETATE 1 DROP: 10 SUSPENSION/ DROPS OPHTHALMIC at 21:44

## 2018-07-17 RX ADMIN — PREDNISOLONE ACETATE 1 DROP: 10 SUSPENSION/ DROPS OPHTHALMIC at 09:03

## 2018-07-17 RX ADMIN — ATORVASTATIN CALCIUM 20 MG: 20 TABLET, FILM COATED ORAL at 18:40

## 2018-07-17 RX ADMIN — CYCLOPENTOLATE HYDROCHLORIDE 1 DROP: 10 SOLUTION/ DROPS OPHTHALMIC at 21:46

## 2018-07-17 RX ADMIN — PREDNISOLONE ACETATE 1 DROP: 10 SUSPENSION/ DROPS OPHTHALMIC at 12:29

## 2018-07-17 RX ADMIN — FLUTICASONE PROPIONATE 1 SPRAY: 50 SPRAY, METERED NASAL at 18:40

## 2018-07-17 RX ADMIN — LISINOPRIL 20 MG: 20 TABLET ORAL at 16:27

## 2018-07-17 RX ADMIN — OFLOXACIN 1 DROP: 3 SOLUTION/ DROPS OPHTHALMIC at 18:40

## 2018-07-17 RX ADMIN — OFLOXACIN 1 DROP: 3 SOLUTION/ DROPS OPHTHALMIC at 21:44

## 2018-07-17 RX ADMIN — PANTOPRAZOLE SODIUM 40 MG: 40 INJECTION, POWDER, FOR SOLUTION INTRAVENOUS at 21:44

## 2018-07-17 RX ADMIN — Medication 1000 MCG: at 09:02

## 2018-07-17 RX ADMIN — PREDNISOLONE ACETATE 1 DROP: 10 SUSPENSION/ DROPS OPHTHALMIC at 18:40

## 2018-07-17 RX ADMIN — ONDANSETRON 4 MG: 2 INJECTION INTRAMUSCULAR; INTRAVENOUS at 10:25

## 2018-07-17 RX ADMIN — PANTOPRAZOLE SODIUM 40 MG: 40 INJECTION, POWDER, FOR SOLUTION INTRAVENOUS at 09:02

## 2018-07-17 RX ADMIN — POTASSIUM CHLORIDE 20 MEQ: 1500 TABLET, EXTENDED RELEASE ORAL at 09:03

## 2018-07-17 RX ADMIN — DEXTROSE, SODIUM CHLORIDE, AND POTASSIUM CHLORIDE 75 ML/HR: 5; .9; .15 INJECTION INTRAVENOUS at 16:27

## 2018-07-17 RX ADMIN — FLUTICASONE PROPIONATE 1 SPRAY: 50 SPRAY, METERED NASAL at 09:03

## 2018-07-17 RX ADMIN — LEVOFLOXACIN 500 MG: 5 INJECTION, SOLUTION INTRAVENOUS at 16:28

## 2018-07-17 NOTE — PROGRESS NOTES
Progress Note - Donald Tello 82 y o  female MRN: [id number]    Unit/Bed#: [address] [place] [address] Encounter: [id number]        Subjective:   Patient is still not eating and drinking well  Lab reveals a potassium  3 2  Gastroenterologist note appreciated patient at present time not willing to go through EGD  Review of Systems    Objective:     Vitals: Blood pressure 158/76, pulse 78, temperature 98 6 °F (37 °C), temperature source Oral, resp  rate 19, height 5' 4" (1 626 m), weight 67 1 kg (148 lb), SpO2 96 %, not currently breastfeeding  ,Body mass index is 25 4 kg/m²        Intake/Output Summary (Last 24 hours) at 07/17/18 1812  Last data filed at 07/17/18 0900   Gross per 24 hour   Intake              240 ml   Output              100 ml   Net              140 ml         Current Facility-Administered Medications:     acetaminophen (TYLENOL) tablet 650 mg, 650 mg, Oral, Q6H PRN, Geovani Hollingsworth MD, 650 mg at 07/17/18 0631    atorvastatin (LIPITOR) tablet 20 mg, 20 mg, Oral, After Francoise Madhav Hollingsworth MD, 20 mg at 07/16/18 1749    cyanocobalamin (VITAMIN B-12) tablet 1,000 mcg, 1,000 mcg, Oral, Daily, Geovani Hollingsworth MD, 1,000 mcg at 07/17/18 0902    cyclopentolate (CYCLOGYL) 1 % ophthalmic solution 1 drop, 1 drop, Left Eye, TID, Geovani Hollingsworth MD, 1 drop at 07/17/18 1629    dextrose 5 % and sodium chloride 0 9 % with KCl 20 mEq/L infusion (premix), 75 mL/hr, Intravenous, Continuous, Geovani Hollingsworth MD, Last Rate: 75 mL/hr at 07/17/18 1627, 75 mL/hr at 07/17/18 1627    fluticasone (FLONASE) 50 mcg/act nasal spray 1 spray, 1 spray, Nasal, BID, Geovani Hollingsworth MD, 1 spray at 07/17/18 0903    levofloxacin (LEVAQUIN) IVPB (premix) 500 mg, 500 mg, Intravenous, Q24H, Geovani Hollingsworth MD, Last Rate: 100 mL/hr at 07/17/18 1628, 500 mg at 07/17/18 1628    lisinopril (ZESTRIL) tablet 20 mg, 20 mg, Oral, Daily, Geovani Hollingsworth MD, 20 mg at 07/17/18 1627    ofloxacin (OCUFLOX) 0 3 % ophthalmic solution 1 drop, 1 drop, Left Eye, 4x Daily, Geovani Hollingsworth MD, 1 drop at 07/17/18 1229    ondansetron (ZOFRAN) injection 4 mg, 4 mg, Intravenous, Q6H PRN, Geovani Hollingsworth MD, 4 mg at 07/17/18 1025    pantoprazole (PROTONIX) injection 40 mg, 40 mg, Intravenous, Q12H MAMIE, Geovani Hollingsworth MD, 40 mg at 07/17/18 0902    potassium chloride (K-DUR,KLOR-CON) CR tablet 20 mEq, 20 mEq, Oral, Daily, Geovani Hollingsworth MD, 20 mEq at 07/17/18 6411    prednisoLONE acetate (PRED FORTE) 1 % ophthalmic suspension 1 drop, 1 drop, Left Eye, 4x Daily, Geovani Hollingsworth MD, 1 drop at 07/17/18 1229     Physical Exam   Constitutional: She is oriented to person, place, and time  She appears well-nourished  No distress  HENT:   Head: Normocephalic and atraumatic  Eyes: Conjunctivae are normal    Neck: Neck supple  No thyromegaly present  Cardiovascular: Normal rate and regular rhythm  Pulmonary/Chest: Effort normal and breath sounds normal  She has no wheezes  Abdominal: Bowel sounds are normal  There is no rebound and no guarding  Diffuse mild tenderness noted in the abdomen   Musculoskeletal: Normal range of motion  She exhibits no edema, tenderness or deformity  Neurological: She is alert and oriented to person, place, and time  Skin: Skin is dry  Psychiatric: She has a normal mood and affect             Lab, Imaging and culture:     Recent Results (from the past 72 hour(s))   CBC and differential    Collection Time: 07/14/18  8:52 PM   Result Value Ref Range    WBC 8 56 4 31 - 10 16 Thousand/uL    RBC 3 48 (L) 3 81 - 5 12 Million/uL    Hemoglobin 10 6 (L) 11 5 - 15 4 g/dL    Hematocrit 31 6 (L) 34 8 - 46 1 %    MCV 91 82 - 98 fL    MCH 30 5 26 8 - 34 3 pg    MCHC 33 5 31 4 - 37 4 g/dL    RDW 12 9 11 6 - 15 1 %    MPV 9 1 8 9 - 12 7 fL    Platelets 384 740 - 728 Thousands/uL    nRBC 0 /100 WBCs    Neutrophils Relative 71 43 - 75 %    Immat GRANS % 1 0 - 2 %    Lymphocytes Relative 15 14 - 44 %    Monocytes Relative 13 (H) 4 - 12 %    Eosinophils Relative 0 0 - 6 %    Basophils Relative 0 0 - 1 %    Neutrophils Absolute 6 08 1 85 - 7 62 Thousands/µL    Immature Grans Absolute 0 06 0 00 - 0 20 Thousand/uL    Lymphocytes Absolute 1 30 0 60 - 4 47 Thousands/µL    Monocytes Absolute 1 11 0 17 - 1 22 Thousand/µL    Eosinophils Absolute 0 00 0 00 - 0 61 Thousand/µL    Basophils Absolute 0 01 0 00 - 0 10 Thousands/µL   Basic metabolic panel    Collection Time: 07/15/18  6:35 AM   Result Value Ref Range    Sodium 138 136 - 145 mmol/L    Potassium 2 7 (LL) 3 5 - 5 3 mmol/L    Chloride 106 100 - 108 mmol/L    CO2 24 21 - 32 mmol/L    Anion Gap 8 4 - 13 mmol/L    BUN 10 5 - 25 mg/dL    Creatinine 0 67 0 60 - 1 30 mg/dL    Glucose 120 65 - 140 mg/dL    Calcium 8 1 (L) 8 3 - 10 1 mg/dL    eGFR 83 ml/min/1 73sq m   CBC and differential    Collection Time: 07/15/18  6:35 AM   Result Value Ref Range    WBC 8 04 4 31 - 10 16 Thousand/uL    RBC 3 44 (L) 3 81 - 5 12 Million/uL    Hemoglobin 10 6 (L) 11 5 - 15 4 g/dL    Hematocrit 31 4 (L) 34 8 - 46 1 %    MCV 91 82 - 98 fL    MCH 30 8 26 8 - 34 3 pg    MCHC 33 8 31 4 - 37 4 g/dL    RDW 13 0 11 6 - 15 1 %    MPV 9 1 8 9 - 12 7 fL    Platelets 380 572 - 456 Thousands/uL    nRBC 0 /100 WBCs    Neutrophils Relative 67 43 - 75 %    Immat GRANS % 1 0 - 2 %    Lymphocytes Relative 18 14 - 44 %    Monocytes Relative 14 (H) 4 - 12 %    Eosinophils Relative 0 0 - 6 %    Basophils Relative 0 0 - 1 %    Neutrophils Absolute 5 38 1 85 - 7 62 Thousands/µL    Immature Grans Absolute 0 04 0 00 - 0 20 Thousand/uL    Lymphocytes Absolute 1 48 0 60 - 4 47 Thousands/µL    Monocytes Absolute 1 12 0 17 - 1 22 Thousand/µL    Eosinophils Absolute 0 01 0 00 - 0 61 Thousand/µL    Basophils Absolute 0 01 0 00 - 0 10 Thousands/µL   Basic metabolic panel    Collection Time: 07/16/18  5:22 AM   Result Value Ref Range    Sodium 135 (L) 136 - 145 mmol/L    Potassium 2 9 (L) 3 5 - 5 3 mmol/L    Chloride 102 100 - 108 mmol/L    CO2 25 21 - 32 mmol/L    Anion Gap 8 4 - 13 mmol/L    BUN 7 5 - 25 mg/dL    Creatinine 0 82 0 60 - 1 30 mg/dL    Glucose 110 65 - 140 mg/dL    Calcium 8 4 8 3 - 10 1 mg/dL    eGFR 67 ml/min/1 73sq m   Magnesium    Collection Time: 07/16/18  5:22 AM   Result Value Ref Range    Magnesium 1 7 1 6 - 2 6 mg/dL   CBC and differential    Collection Time: 07/16/18  5:22 AM   Result Value Ref Range    WBC 5 99 4 31 - 10 16 Thousand/uL    RBC 3 67 (L) 3 81 - 5 12 Million/uL    Hemoglobin 11 0 (L) 11 5 - 15 4 g/dL    Hematocrit 33 6 (L) 34 8 - 46 1 %    MCV 92 82 - 98 fL    MCH 30 0 26 8 - 34 3 pg    MCHC 32 7 31 4 - 37 4 g/dL    RDW 12 8 11 6 - 15 1 %    MPV 9 1 8 9 - 12 7 fL    Platelets 822 263 - 695 Thousands/uL    nRBC 0 /100 WBCs    Neutrophils Relative 63 43 - 75 %    Immat GRANS % 1 0 - 2 %    Lymphocytes Relative 23 14 - 44 %    Monocytes Relative 12 4 - 12 %    Eosinophils Relative 1 0 - 6 %    Basophils Relative 0 0 - 1 %    Neutrophils Absolute 3 83 1 85 - 7 62 Thousands/µL    Immature Grans Absolute 0 04 0 00 - 0 20 Thousand/uL    Lymphocytes Absolute 1 36 0 60 - 4 47 Thousands/µL    Monocytes Absolute 0 71 0 17 - 1 22 Thousand/µL    Eosinophils Absolute 0 03 0 00 - 0 61 Thousand/µL    Basophils Absolute 0 02 0 00 - 0 10 Thousands/µL   CBC and differential    Collection Time: 07/17/18  5:32 AM   Result Value Ref Range    WBC 5 80 4 31 - 10 16 Thousand/uL    RBC 3 51 (L) 3 81 - 5 12 Million/uL    Hemoglobin 10 6 (L) 11 5 - 15 4 g/dL    Hematocrit 31 7 (L) 34 8 - 46 1 %    MCV 90 82 - 98 fL    MCH 30 2 26 8 - 34 3 pg    MCHC 33 4 31 4 - 37 4 g/dL    RDW 13 0 11 6 - 15 1 %    MPV 8 9 8 9 - 12 7 fL    Platelets 510 391 - 995 Thousands/uL    nRBC 0 /100 WBCs    Neutrophils Relative 53 43 - 75 %    Immat GRANS % 1 0 - 2 %    Lymphocytes Relative 30 14 - 44 %    Monocytes Relative 15 (H) 4 - 12 %    Eosinophils Relative 1 0 - 6 %    Basophils Relative 0 0 - 1 %    Neutrophils Absolute 3 09 1 85 - 7 62 Thousands/µL    Immature Grans Absolute 0 03 0 00 - 0 20 Thousand/uL    Lymphocytes Absolute 1 73 0 60 - 4 47 Thousands/µL    Monocytes Absolute 0 87 0 17 - 1 22 Thousand/µL    Eosinophils Absolute 0 07 0 00 - 0 61 Thousand/µL    Basophils Absolute 0 01 0 00 - 0 10 Thousands/µL   Basic metabolic panel    Collection Time: 07/17/18  5:32 AM   Result Value Ref Range    Sodium 133 (L) 136 - 145 mmol/L    Potassium 3 2 (L) 3 5 - 5 3 mmol/L    Chloride 102 100 - 108 mmol/L    CO2 22 21 - 32 mmol/L    Anion Gap 9 4 - 13 mmol/L    BUN 6 5 - 25 mg/dL    Creatinine 0 87 0 60 - 1 30 mg/dL    Glucose 106 65 - 140 mg/dL    Calcium 8 0 (L) 8 3 - 10 1 mg/dL    eGFR 63 ml/min/1 73sq m   UA w Reflex to Microscopic w Reflex to Culture    Collection Time: 07/17/18  9:48 AM   Result Value Ref Range    Color, UA Yellow     Clarity, UA Clear     Specific Gravity, UA 1 020 1 000 - 1 030    pH, UA 7 0 5 0 - 9 0    Leukocytes, UA Negative Negative    Nitrite, UA Negative Negative    Protein, UA Negative Negative mg/dl    Glucose, UA Negative Negative mg/dl    Ketones, UA Negative Negative mg/dl    Urobilinogen, UA 1 0 0 2, 1 0 E U /dl E U /dl    Bilirubin, UA Negative Negative    Blood, UA Moderate (A) Negative   Urine Microscopic    Collection Time: 07/17/18  9:48 AM   Result Value Ref Range    RBC, UA 0-1 (A) None Seen, 0-5 /hpf    WBC, UA 1-2 (A) None Seen, 0-5, 5-55, 5-65 /hpf    Epithelial Cells Occasional None Seen, Occasional /hpf    Bacteria, UA Occasional None Seen, Occasional /hpf       PE Study with CT Abdomen and Pelvis with contrast   Final Result      1  Emphysema  2   No evidence of pulmonary embolus or other acute abnormality in the chest    3   Hiatal hernia and generalized mural thickening of the esophagus, consistent with esophagitis or presbyesophagus  4   No acute abnormality in the abdomen or pelvis  5   Sigmoid diverticulosis without evidence of diverticulitis                       Workstation performed: [id number]             Invasive Devices     Peripheral Intravenous Line            Peripheral IV 07/15/18 Right Wrist 2 days                      Assessment:  Patient with scant hematemesis most likely Diane-Cancino tear however she is still not feeling well not eating   Gastroenteritis viral syndrome  Urinary tract infection  History of hypertension  DJD  Hypercholesterolemia  Hypokalemia 3 2  Plan:  Advance diet to regular cardiac low sodium  IV fluids with KCl  KCl bolus  BMP tomorrow morning  IV antibiotic  PT and OT evaluation and treatment  Who recommended short-term rehab

## 2018-07-17 NOTE — OCCUPATIONAL THERAPY NOTE
OT EVALUATION     07/17/18 1310   Note Type   Note type Eval only   Restrictions/Precautions   Other Precautions Chair Alarm; Bed Alarm; Fall Risk   Pain Assessment   Pain Assessment No/denies pain   Home Living   Type of [address] Two level; Able to live on main level with bedroom/bathroom  (no steps to enter )   Bathroom Shower/Tub Tub/shower unit   Bathroom Equipment Shower chair;Grab bars in [facility] Walker;Cane   Additional Comments independent without assistive device prior to admission    Prior Function   Level of [place] Independent with ADLs and functional mobility   Lives With Alone   ADL Assistance Independent   IADLs Independent   Comments patient reports independence with all ADLS and IADLS prior to admission  Patient reports driving, cooking, cleaning and mowing her own lawn  ADL   Eating Assistance 5  Supervision/Setup   Grooming Assistance 5  Supervision/Setup   UB Bathing Assistance 5  Supervision/Setup   LB Bathing Assistance 4  Minimal Assistance   UB Dressing Assistance 5  Supervision/Setup   LB Dressing Assistance 4  Minimal Assistance   Toileting Assistance  4  Minimal Assistance   Bed Mobility   Sit to Supine 4  Minimal assistance   Transfers   Sit to Stand 4  Minimal assistance   Stand to Sit 4  Minimal assistance   Additional Comments pt reports feeling fatigued and wanting to go back to bed    Patient reports she just doesnt have strength and food doesnt taste good    Functional Mobility   Functional Mobility 4  Minimal assistance   Additional Comments 20 feet   Additional items Rolling walker   Balance   Static Sitting Fair +   Dynamic Sitting Fair   Static Standing Fair -   Dynamic Standing Poor   Activity Tolerance   Activity Tolerance Patient limited by fatigue   Nurse Made Aware yes   RUE Assessment   RUE Assessment WFL  (4-/5; arthritis deformities in fingers)   LUE Assessment   LUE Assessment WFL  (4-/5; arthritis deformities in fingers ) Cognition   Overall Cognitive Status WFL   Arousal/Participation Cooperative   Attention Within functional limits   Orientation Level Oriented X4   Following Commands Follows multistep commands with increased time or repetition   Comments Resighini   Assessment   Limitation Decreased ADL status; Decreased UE strength;Decreased Safe judgement during ADL;Decreased endurance;Decreased self-care trans;Decreased high-level ADLs  (decreased balance and mobility )   Prognosis Good   Assessment Patient evaluated by Occupational Therapy  Patient admitted with GI bleed  The patients occupational profile, medical and therapy history includes a extensive additional review of physical, cognitive, or psychosocial history related to current functional performance  Comorbidities affecting functional mobility and ADLS include: arthritis, hypertension and ISA  Prior to admission, patient was living alone, independent with functional mobility without assistive device, independent with ADLS and independent with IADLS  The evaluation identifies the following performance deficits: weakness, impaired balance, decreased endurance, increased fall risk, new onset of impairment of functional mobility, decreased ADLS, decreased IADLS, decreased activity tolerance, decreased safety awareness, impaired judgement and decreased strength, that result in activity limitations and/or participation restrictions  This evaluation requires clinical decision making of high complexity, because the patient presents with comorbidites that affect occupational performance and required significant modification of tasks or assistance with consideration of multiple treatment options  The Barthel Index was used as a functional outcome tool presenting with a score of 40, indicating marked limitations of functional mobility and ADLS    Patient will benefit from skilled Occupational Therapy services to address above deficits and facilitate a safe return to prior level of function  Goals   Patient Goals get stronger, be independent at home   STG Time Frame (1-7 days)   Short Term Goal  Goals established to promote pt goal of getting stronger and being independent: Patient will increase standing tolerance to 3 minutes during ADL task to decrease assistance level and decrease fall risk; Patient will increase bed mobility to supervision in preparation for ADLS and transfers; Patient will increase functional mobility to and from bathroom with rolling walker with supervision to increase performance with ADLS and to use a toilet; Patient will tolerate 10 minutes of UE ROM/strengthening to increase general activity tolerance and performance in ADLS/IADLS; Patient will improve functional activity tolerance to 10 minutes of sustained functional tasks to increase participation in basic self-care and decrease assistance level;  Patient will be able to to verbalize understanding and perform energy conservation/proper body mechanics during ADLS and functional mobility at least 75% of the time with minimalcueing to decrease signs of fatigue and increase stamina to return to prior level of function; Patient will increase dynamic sitting balance to good to improve the ability to sit at edge of bed or on a chair for ADLS;  Patient will increase dynamic standing balance to fair to improve postural stability and decrease fall risk during standing ADLS and transfers  LTG Time Frame (8-14 days)   Long Term Goal Goals established to promote pt goal of getting stronger and being independent: Patient will increase standing tolerance to 6 minutes during ADL task to decrease assistance level and decrease fall risk; Patient will increase bed mobility to independent in preparation for ADLS and transfers;  Patient will increase functional mobility to and from bathroom with rolling walker independently to increase performance with ADLS and to use a toilet; Patient will tolerate 20 minutes of UE ROM/strengthening to increase general activity tolerance and performance in ADLS/IADLS; Patient will improve functional activity tolerance to 20 minutes of sustained functional tasks to increase participation in basic self-care and decrease assistance level;  Patient will be able to to verbalize understanding and perform energy conservation/proper body mechanics during ADLS and functional mobility at least 90% of the time without cueing to decrease signs of fatigue and increase stamina to return to prior level of function; Patient will increase dynamic standing balance to fair+ to improve postural stability and decrease fall risk during standing ADLS and transfers  Functional Transfer Goals   Pt Will Perform All Functional Transfers (STG supervision LTG independent )   ADL Goals   Pt Will Perform All ADL's (goals established to promote pt goal of going home/strength )   Pt Will Perform Eating (STG independent )   Pt Will Perform Grooming (STG independent )   Pt Will Perform Bathing (STG supervision LTG independent )   Pt Will Perform UE Dressing (STG independent )   Pt Will Perform LE Dressing (STG supervision LTG independent )   Pt Will Perform Toileting (STG supervision LTG independent )   Plan   Treatment Interventions ADL retraining;Functional transfer training;UE strengthening/ROM; Endurance training;Patient/family training;Equipment evaluation/education; Activityengagement; Energy conservation   Goal Expiration Date 07/31/18   Treatment Day 0   OT Frequency 3-5x/wk   Recommendation   OT Discharge Recommendation Short Term Rehab   Barthel Index   Feeding 5   Bathing 0   Grooming Score 0   Dressing Score 5   Bladder Score 5   Bowels Score 10   Toilet Use Score 5   Transfers (Bed/Chair) Score 10   Mobility (Level Surface) Score 0   Stairs Score 0   Barthel Index Score 40   Licensure   [place] License Number  Wing Gains MS OTR/L [id number]

## 2018-07-17 NOTE — PROGRESS NOTES
Progress Note - Ankit Herman 82 y o  female MRN: [id number]    Unit/Bed#: [address] [place] [address] Encounter: [id number]      Subjective:   Patient is seen and examined at bedside  Discussed with nurse no acute events overnight  Appetite was poor for breakfast   She has had no further nausea vomiting  Left lower abdominal pain is resolved but complains today of lower back pain  Denies any fever chills headache chest pain shortness of breath reflux gas bloating dysphagia  Some urgency with urine  No numbness tingling or rashes  Objective:     Vitals: Blood pressure 162/79, pulse 64, temperature 97 8 °F (36 6 °C), temperature source Oral, resp  rate 18, height 5' 4" (1 626 m), weight 67 1 kg (148 lb), SpO2 96 %, not currently breastfeeding  ,Body mass index is 25 4 kg/m²        Intake/Output Summary (Last 24 hours) at 07/17/18 7498  Last data filed at 07/16/18 1500   Gross per 24 hour   Intake                0 ml   Output              200 ml   Net             -200 ml       Gastrointestinal ROS:no abdominal pain, change in bowel habits, or black or bloody stools      Physical:   General well appearing no acute distress   Abdomen: soft non tender to touch bowel sounds + x 4 quads no guarding or rebound tenderness                   Current Facility-Administered Medications:     acetaminophen (TYLENOL) tablet 650 mg, 650 mg, Oral, Q6H PRN, Geovani Hollingsworth MD, 650 mg at 07/17/18 0631    atorvastatin (LIPITOR) tablet 20 mg, 20 mg, Oral, After Cricket Hollingsworth MD, 20 mg at 07/16/18 1749    cyanocobalamin (VITAMIN B-12) tablet 1,000 mcg, 1,000 mcg, Oral, Daily, Geovani Hollingsworth MD, 1,000 mcg at 07/17/18 0902    cyclopentolate (CYCLOGYL) 1 % ophthalmic solution 1 drop, 1 drop, Left Eye, TID, Geovani Hollingsworth MD, 1 drop at 07/17/18 0903    dextrose 5 % and sodium chloride 0 9 % with KCl 20 mEq/L infusion (premix), 75 mL/hr, Intravenous, Continuous, Geovani Hollingsworth MD, Last Rate: 75 mL/hr at 07/16/18 1508, 75 mL/hr at 07/16/18 1508    fluticasone (FLONASE) 50 mcg/act nasal spray 1 spray, 1 spray, Nasal, BID, Geovani Hollingsworth MD, 1 spray at 07/17/18 0903    levofloxacin (LEVAQUIN) IVPB (premix) 500 mg, 500 mg, Intravenous, Q24H, Geovani Hollingsworth MD, Last Rate: 100 mL/hr at 07/16/18 1749, 500 mg at 07/16/18 1749    lisinopril (ZESTRIL) tablet 20 mg, 20 mg, Oral, Daily, Geovani Hollingsworth MD, 20 mg at 07/16/18 1748    ofloxacin (OCUFLOX) 0 3 % ophthalmic solution 1 drop, 1 drop, Left Eye, 4x Daily, Geovani Hollingsworth MD, 1 drop at 07/17/18 0903    ondansetron (ZOFRAN) injection 4 mg, 4 mg, Intravenous, Q6H PRN, Geovani Hollingsworth MD, 4 mg at 07/16/18 1332    pantoprazole (PROTONIX) injection 40 mg, 40 mg, Intravenous, Q12H [address], Geovani Hollingsworth MD, 40 mg at 07/17/18 0902    potassium chloride (K-DUR,KLOR-CON) CR tablet 20 mEq, 20 mEq, Oral, Daily, Geovani Hollingsworth MD, 20 mEq at 07/17/18 0903    potassium chloride 20 mEq IVPB (premix), 20 mEq, Intravenous, Once, Geovani Hollingsworth MD, Last Rate: 50 mL/hr at 07/17/18 0807, 20 mEq at 07/17/18 0807    prednisoLONE acetate (PRED FORTE) 1 % ophthalmic suspension 1 drop, 1 drop, Left Eye, 4x Daily, Geovani Hollingsworth MD, 1 drop at 07/17/18 0903    Lab, Imaging and other studies:  Lab Results   Component Value Date    WBC 5 80 07/17/2018    HGB 10 6 (L) 07/17/2018    HCT 31 7 (L) 07/17/2018    MCV 90 07/17/2018     07/17/2018                                                              Lab Results   Component Value Date    GLUCOSE 106 07/17/2018    CALCIUM 8 0 (L) 07/17/2018     (L) 07/17/2018    K 3 2 (L) 07/17/2018    CO2 22 07/17/2018     07/17/2018    BUN 6 07/17/2018    CREATININE 0 87 07/17/2018       Lab Results   Component Value Date    ALT 18 07/14/2018    AST 16 07/14/2018    ALKPHOS 62 07/14/2018    BILITOT 0 50 07/14/2018           Assessment/Plan:  1   Hematemesis reported melena- she has had no further vomiting hemoglobin is stable, could have had ashley oconnor tear due to vomiting  Discussed again option for EGD for further evaluation given poor appetite, she prefers to hold off as she states she does not like the food  Consider nutritional supplement  2  Left lower abdominal pain now resolved may have been due to spasms or inflammatory   3  Nausea vomiting diarrhea resolved likely viral     Above case discussed with  Dr Stephany Sandy all bull decisions made by him

## 2018-07-17 NOTE — SOCIAL WORK
CM made two attempts to see pt to discuss therapy recommendation for STR, pt was asleep both attempts

## 2018-07-18 LAB
ANION GAP SERPL CALCULATED.3IONS-SCNC: 10 MMOL/L (ref 4–13)
BASOPHILS # BLD AUTO: 0.02 THOUSANDS/ΜL (ref 0–0.1)
BASOPHILS NFR BLD AUTO: 0 % (ref 0–1)
BUN SERPL-MCNC: 7 MG/DL (ref 5–25)
CALCIUM SERPL-MCNC: 8.2 MG/DL (ref 8.3–10.1)
CHLORIDE SERPL-SCNC: 102 MMOL/L (ref 100–108)
CO2 SERPL-SCNC: 24 MMOL/L (ref 21–32)
CREAT SERPL-MCNC: 0.83 MG/DL (ref 0.6–1.3)
EOSINOPHIL # BLD AUTO: 0.08 THOUSAND/ΜL (ref 0–0.61)
EOSINOPHIL NFR BLD AUTO: 1 % (ref 0–6)
ERYTHROCYTE [DISTWIDTH] IN BLOOD BY AUTOMATED COUNT: 13.2 % (ref 11.6–15.1)
GFR SERPL CREATININE-BSD FRML MDRD: 66 ML/MIN/1.73SQ M
GLUCOSE SERPL-MCNC: 100 MG/DL (ref 65–140)
HCT VFR BLD AUTO: 33.6 % (ref 34.8–46.1)
HGB BLD-MCNC: 11.2 G/DL (ref 11.5–15.4)
IMM GRANULOCYTES # BLD AUTO: 0.05 THOUSAND/UL (ref 0–0.2)
IMM GRANULOCYTES NFR BLD AUTO: 1 % (ref 0–2)
LYMPHOCYTES # BLD AUTO: 1.75 THOUSANDS/ΜL (ref 0.6–4.47)
LYMPHOCYTES NFR BLD AUTO: 28 % (ref 14–44)
MCH RBC QN AUTO: 30.4 PG (ref 26.8–34.3)
MCHC RBC AUTO-ENTMCNC: 33.3 G/DL (ref 31.4–37.4)
MCV RBC AUTO: 91 FL (ref 82–98)
MONOCYTES # BLD AUTO: 0.79 THOUSAND/ΜL (ref 0.17–1.22)
MONOCYTES NFR BLD AUTO: 13 % (ref 4–12)
NEUTROPHILS # BLD AUTO: 3.58 THOUSANDS/ΜL (ref 1.85–7.62)
NEUTS SEG NFR BLD AUTO: 57 % (ref 43–75)
NRBC BLD AUTO-RTO: 0 /100 WBCS
PLATELET # BLD AUTO: 314 THOUSANDS/UL (ref 149–390)
PMV BLD AUTO: 9 FL (ref 8.9–12.7)
POTASSIUM SERPL-SCNC: 3.3 MMOL/L (ref 3.5–5.3)
RBC # BLD AUTO: 3.68 MILLION/UL (ref 3.81–5.12)
SODIUM SERPL-SCNC: 136 MMOL/L (ref 136–145)
WBC # BLD AUTO: 6.27 THOUSAND/UL (ref 4.31–10.16)

## 2018-07-18 PROCEDURE — C9113 INJ PANTOPRAZOLE SODIUM, VIA: HCPCS | Performed by: INTERNAL MEDICINE

## 2018-07-18 PROCEDURE — 97535 SELF CARE MNGMENT TRAINING: CPT

## 2018-07-18 PROCEDURE — 85025 COMPLETE CBC W/AUTO DIFF WBC: CPT | Performed by: INTERNAL MEDICINE

## 2018-07-18 PROCEDURE — 97110 THERAPEUTIC EXERCISES: CPT

## 2018-07-18 PROCEDURE — 80048 BASIC METABOLIC PNL TOTAL CA: CPT | Performed by: INTERNAL MEDICINE

## 2018-07-18 RX ORDER — POTASSIUM CHLORIDE 14.9 MG/ML
20 INJECTION INTRAVENOUS ONCE
Status: COMPLETED | OUTPATIENT
Start: 2018-07-18 | End: 2018-07-18

## 2018-07-18 RX ADMIN — POTASSIUM CHLORIDE 20 MEQ: 200 INJECTION, SOLUTION INTRAVENOUS at 10:58

## 2018-07-18 RX ADMIN — OFLOXACIN 1 DROP: 3 SOLUTION/ DROPS OPHTHALMIC at 10:59

## 2018-07-18 RX ADMIN — CYCLOPENTOLATE HYDROCHLORIDE 1 DROP: 10 SOLUTION/ DROPS OPHTHALMIC at 08:02

## 2018-07-18 RX ADMIN — OFLOXACIN 1 DROP: 3 SOLUTION/ DROPS OPHTHALMIC at 12:00

## 2018-07-18 RX ADMIN — PREDNISOLONE ACETATE 1 DROP: 10 SUSPENSION/ DROPS OPHTHALMIC at 12:00

## 2018-07-18 RX ADMIN — PREDNISOLONE ACETATE 1 DROP: 10 SUSPENSION/ DROPS OPHTHALMIC at 09:06

## 2018-07-18 RX ADMIN — LISINOPRIL 20 MG: 20 TABLET ORAL at 17:35

## 2018-07-18 RX ADMIN — PANTOPRAZOLE SODIUM 40 MG: 40 INJECTION, POWDER, FOR SOLUTION INTRAVENOUS at 21:43

## 2018-07-18 RX ADMIN — CYCLOPENTOLATE HYDROCHLORIDE 1 DROP: 10 SOLUTION/ DROPS OPHTHALMIC at 16:00

## 2018-07-18 RX ADMIN — OFLOXACIN 1 DROP: 3 SOLUTION/ DROPS OPHTHALMIC at 21:44

## 2018-07-18 RX ADMIN — PREDNISOLONE ACETATE 1 DROP: 10 SUSPENSION/ DROPS OPHTHALMIC at 17:36

## 2018-07-18 RX ADMIN — LEVOFLOXACIN 500 MG: 5 INJECTION, SOLUTION INTRAVENOUS at 17:53

## 2018-07-18 RX ADMIN — FLUTICASONE PROPIONATE 1 SPRAY: 50 SPRAY, METERED NASAL at 17:35

## 2018-07-18 RX ADMIN — PREDNISOLONE ACETATE 1 DROP: 10 SUSPENSION/ DROPS OPHTHALMIC at 21:44

## 2018-07-18 RX ADMIN — Medication 1000 MCG: at 08:01

## 2018-07-18 RX ADMIN — POTASSIUM CHLORIDE 20 MEQ: 1500 TABLET, EXTENDED RELEASE ORAL at 08:02

## 2018-07-18 RX ADMIN — DEXTROSE, SODIUM CHLORIDE, AND POTASSIUM CHLORIDE 75 ML/HR: 5; .9; .15 INJECTION INTRAVENOUS at 21:43

## 2018-07-18 RX ADMIN — CYCLOPENTOLATE HYDROCHLORIDE 1 DROP: 10 SOLUTION/ DROPS OPHTHALMIC at 21:43

## 2018-07-18 RX ADMIN — PANTOPRAZOLE SODIUM 40 MG: 40 INJECTION, POWDER, FOR SOLUTION INTRAVENOUS at 08:02

## 2018-07-18 RX ADMIN — ATORVASTATIN CALCIUM 20 MG: 20 TABLET, FILM COATED ORAL at 17:34

## 2018-07-18 RX ADMIN — OFLOXACIN 1 DROP: 3 SOLUTION/ DROPS OPHTHALMIC at 17:36

## 2018-07-18 RX ADMIN — FLUTICASONE PROPIONATE 1 SPRAY: 50 SPRAY, METERED NASAL at 08:02

## 2018-07-18 NOTE — PROGRESS NOTES
Progress Note - Stefano Smalls 82 y o  female MRN: [id number]    Unit/Bed#: [address] [place] [address] Encounter: [id number]      Subjective:   Patient is seen and examined in bed no acute distress  She tells me she was able to eat toast this morning  Abdominal pain has resolved  She was having back pain yesterday which is now resolved  Denies any fever chills weakness dizziness chest pain palpitations shortness of breath urinary urgency frequency dark urine new joint pain bruising numbness or tingling  She is upset as she does not want to go to rehab    Objective:     Vitals: Blood pressure 158/79, pulse 76, temperature 97 9 °F (36 6 °C), temperature source Oral, resp  rate 18, height 5' 4" (1 626 m), weight 67 1 kg (148 lb), SpO2 96 %, not currently breastfeeding  ,Body mass index is 25 4 kg/m²        Intake/Output Summary (Last 24 hours) at 07/18/18 1029  Last data filed at 07/18/18 0757   Gross per 24 hour   Intake              360 ml   Output              800 ml   Net             -440 ml       Gastrointestinal ROS: no abdominal pain, change in bowel habits, or black or bloody stools      Physical:   General no acute distress   Abdomen: soft non tender to touch bowel sounds + x 4 quads no guarding or rebound tenderness                   Current Facility-Administered Medications:     acetaminophen (TYLENOL) tablet 650 mg, 650 mg, Oral, Q6H PRN, Geovani Hollingsworth MD, 650 mg at 07/17/18 0631    atorvastatin (LIPITOR) tablet 20 mg, 20 mg, Oral, After Chuck Hollingsworth MD, 20 mg at 07/17/18 1840    cyanocobalamin (VITAMIN B-12) tablet 1,000 mcg, 1,000 mcg, Oral, Daily, Geovani Hollingsworth MD, 1,000 mcg at 07/18/18 0801    cyclopentolate (CYCLOGYL) 1 % ophthalmic solution 1 drop, 1 drop, Left Eye, TID, Geovani Hollingsworth MD, 1 drop at 07/18/18 0802    dextrose 5 % and sodium chloride 0 9 % with KCl 20 mEq/L infusion (premix), 75 mL/hr, Intravenous, Continuous, Geovani Hollingsworth MD, Last Rate: 75 mL/hr at 07/17/18 1627, 75 mL/hr at 07/17/18 1627    fluticasone (FLONASE) 50 mcg/act nasal spray 1 spray, 1 spray, Nasal, BID, Geovani Hollingsworth MD, 1 spray at 07/18/18 0802    levofloxacin (LEVAQUIN) IVPB (premix) 500 mg, 500 mg, Intravenous, Q24H, Geovani Hollingsworth MD, Last Rate: 100 mL/hr at 07/17/18 1628, 500 mg at 07/17/18 1628    lisinopril (ZESTRIL) tablet 20 mg, 20 mg, Oral, Daily, Geovani Hollingsworth MD, 20 mg at 07/17/18 1627    ofloxacin (OCUFLOX) 0 3 % ophthalmic solution 1 drop, 1 drop, Left Eye, 4x Daily, Geovani Hollingsworth MD, 1 drop at 07/17/18 2144    ondansetron (ZOFRAN) injection 4 mg, 4 mg, Intravenous, Q6H PRN, Geovani Hollingsworth MD, 4 mg at 07/17/18 1025    pantoprazole (PROTONIX) injection 40 mg, 40 mg, Intravenous, Q12H [address], Geovani Hollingsworth MD, 40 mg at 07/18/18 0802    potassium chloride (K-DUR,KLOR-CON) CR tablet 20 mEq, 20 mEq, Oral, Daily, Geovani Hollingsworth MD, 20 mEq at 07/18/18 0802    potassium chloride 20 mEq IVPB (premix), 20 mEq, Intravenous, Once, Geovani Hollingsworth MD    prednisoLONE acetate (PRED FORTE) 1 % ophthalmic suspension 1 drop, 1 drop, Left Eye, 4x Daily, Geovani Hollingsworth MD, 1 drop at 07/17/18 2144    Lab, Imaging and other studies:  Lab Results   Component Value Date    WBC 6 27 07/18/2018    HGB 11 2 (L) 07/18/2018    HCT 33 6 (L) 07/18/2018    MCV 91 07/18/2018     07/18/2018                                                              Lab Results   Component Value Date    GLUCOSE 100 07/18/2018    CALCIUM 8 2 (L) 07/18/2018     07/18/2018    K 3 3 (L) 07/18/2018    CO2 24 07/18/2018     07/18/2018    BUN 7 07/18/2018    CREATININE 0 83 07/18/2018       Lab Results   Component Value Date    ALT 18 07/14/2018    AST 16 07/14/2018    ALKPHOS 62 07/14/2018    BILITOT 0 50 07/14/2018           Assessment/Plan:  1  Hematemesis melena hemoglobin is improving her nausea and vomiting has resolved   Offer EGD again regarding diminished appetite and previous hematemesis with no previous EGD evlauation but she prefers to hold off  Continue PPI at this time  2  Nausea vomiting diarrhea likely viral and has resolved       Above case discussed with  Dr Anita Mann all bull decisions made by him

## 2018-07-18 NOTE — PLAN OF CARE
Problem: OCCUPATIONAL THERAPY ADULT  Goal: Performs self-care activities at highest level of function for planned discharge setting  See evaluation for individualized goals  Outcome: Progressing  Limitation: Decreased ADL status, Decreased UE strength, Decreased Safe judgement during ADL, Decreased endurance, Decreased self-care trans, Decreased high-level ADLs (decreased balance and mobility )  Prognosis: Good  Assessment: Pt demonstrating improved strength, balance and functional activity tolerance allowing for increased active participation with ADL and mobility tasks  Good progress towards goals  Pt left sitting in chair with all needs within reach and tab alarm in place  Cont OT per POC       OT Discharge Recommendation: Short Term Rehab

## 2018-07-18 NOTE — CASE MANAGEMENT
Continued Stay Review    Date: 7/18/18    Vital Signs: BP (!) 174/65   Pulse 88   Temp 98 °F (36 7 °C) (Tympanic)   Resp 18   Ht 5' 4" (1 626 m)   Wt 67 1 kg (148 lb)   LMP  (LMP Unknown)   SpO2 94%   Breastfeeding? No   BMI 25 40 kg/m²     Medications:   Scheduled Meds:   Current Facility-Administered Medications:  acetaminophen 650 mg Oral Q6H PRN Geovani Hollingsworth MD    atorvastatin 20 mg Oral After Dinner Bernadette Mahoney MD    cyanocobalamin 1,000 mcg Oral Daily Geovani Hollingsworth MD    cyclopentolate 1 drop Left Eye TID Geovani Hollingsworth MD    dextrose 5 % and sodium chloride 0 9 % with KCl 20 mEq/L 75 mL/hr Intravenous Continuous Geovani Hollingsworth MD Last Rate: 75 mL/hr (07/17/18 1627)   fluticasone 1 spray Nasal BID Geovani Hollingsworth MD    levofloxacin 500 mg Intravenous Q24H Geovani Hollingsworth MD Last Rate: 500 mg (07/17/18 1628)   lisinopril 20 mg Oral Daily Geovani Hollingsworth MD    ofloxacin 1 drop Left Eye 4x Daily Geovain Hollingsworth MD    ondansetron 4 mg Intravenous Q6H PRN Geovani Hollingsworth MD    pantoprazole 40 mg Intravenous Q12H [address] Geovani Hollingsworth MD    potassium chloride 20 mEq Oral Daily Geovani Hollingsworth MD    prednisoLONE acetate 1 drop Left Eye 4x Daily Geovani Hollingsworth MD      Continuous Infusions:   dextrose 5 % and sodium chloride 0 9 % with KCl 20 mEq/L 75 mL/hr Last Rate: 75 mL/hr (07/17/18 1627)     PRN Meds:   acetaminophen    ondansetron    Abnormal Labs/Diagnostic Results:     Age/Sex: 82 y o  female     Attending 7/17/18  Assessment:  Patient with scant hematemesis most likely Diane-Cancino tear however she is still not feeling well not eating   Gastroenteritis viral syndrome  Urinary tract infection  History of hypertension  DJD  Hypercholesterolemia  Hypokalemia 3 2  Plan:  Advance diet to regular cardiac low sodium  IV fluids with KCl  KCl bolus  BMP tomorrow morning  IV antibiotic  PT and OT evaluation and treatment  Who recommended short-term rehab      GI  Patient is seen and examined in bed no acute distress  She tells me she was able to eat toast this morning  Abdominal pain has resolved  She was having back pain yesterday which is now resolved  1  Hematemesis melena hemoglobin is improving her nausea and vomiting has resolved  Offer EGD again regarding diminished appetite and previous hematemesis with no previous EGD evlauation but she prefers to hold off  Continue PPI at this time     2  Nausea vomiting diarrhea likely viral and has resolved

## 2018-07-18 NOTE — SOCIAL WORK
CM spoke with the pt at the bedside re: Katalina Harrison to STR  Pt has declined the STR option indicated that she wants to go home because she wants to be able to control her heat  Pt is not concerned over her safety  CM advised of the multiple recommendations for her to go to rehab and indicated some of the reasons why this would be important  Pt continues to deny this need  Pt also declining in home services stating that I had that before and they did not feel I needed it  I have a list of their exercises to do in the home  CM spoke with PCP who has also had this conversation with her and noted that she has declined the STR and homecare options  CM then spoke with the nephew who came in to visit with her  He indicated that when they spoke yesterday she had indicated that she would consider it, asked CM to speak with her again at the bedside with him so she can be informed  CM spoke with the pt and the nephew at the bedside  Again reviewed the notes from PT and the safety concerns for her at home alone  Pt stated that she would now consider rehab but she will only go for 1 week  After that she is going home  Pt indicated that she was at [facility] in the past and that is where she wanted to go  She stated that she would also consider [address] due to the high ratings it has gotten  CM made referrals to both via [employer], pt has a bed available at [facility] for tomorrow for anticipated DC at that time  Pt is aware, will contact nephew in the AM when DC is written

## 2018-07-18 NOTE — OCCUPATIONAL THERAPY NOTE
OT TREATMENT       07/18/18 1610   Restrictions/Precautions   Other Precautions Chair Alarm; Bed Alarm; Fall Risk;Pain;Hard of hearing   Pain Assessment   Pain Assessment No/denies pain   ADL   Where Assessed Chair   Eating Assistance 5  Supervision/Setup   Eating Deficit Setup  (opening containers)   Grooming Assistance 5  Supervision/Setup   Grooming Deficit Verbal cueing   UB Bathing Assistance 4  Minimal Assistance   UB Bathing Deficit Verbal cueing; Increased time to complete   UB Dressing Assistance 4  Minimal Assistance   UB Dressing Deficit Fasteners   Toileting Assistance  5  Supervision/Setup   Toileting Deficit Verbal cueing   Functional Standing Tolerance   Time 4 minutes; 3 minutes   Activity BUE exercises and dressing   Comments Elsy without AD   Bed Mobility   Supine to Sit 5  Supervision   Transfers   Sit to Stand 5  Supervision   Stand to Sit 5  Supervision   Stand pivot 5  Supervision   Additional items Verbal cues   Functional Mobility   Functional Mobility 4  Minimal assistance   Additional Comments 40 feet   Additional items ([place])   Toilet Transfers   Toilet Transfer From [employer] Transfer Type To and from   Toilet Transfer to Raised toilet seat with rails   Toilet Transfer Technique Ambulating   Toilet Transfers Supervision;Verbal cues   Therapeutic Excerise-Strength   UE Strength Yes   Right Upper Extremity- Strength   R Shoulder Flexion;ABduction; Extension; External rotation; Internal rotation   R Elbow Elbow flexion;Elbow extension   R Wrist Wrist flexion;Wrist extension   R Position Standing   Equipment [employer]   R Weight/Reps/Sets 15 reps with 2#   Left Upper Extremity-Strength   L Shoulder Flexion;ABduction; Extension; External rotation; Internal rotation   L Elbow Elbow flexion;Elbow extension   L Wrist Wrist flexion;Wrist extension   L Position Standing   Equipment [employer]   L Weights/Reps/Sets 15 reps with 2#   Cognition   Overall Cognitive Status WFL   Activity Tolerance   Activity Tolerance Patient limited by fatigue   Medical Staff Made Jameson Love, RN   Assessment   Assessment Pt demonstrating improved strength, balance and functional activity tolerance allowing for increased active participation with ADL and mobility tasks  Good progress towards goals  Pt left sitting in chair with all needs within reach and tab alarm in place  Cont OT per POC  Plan   Treatment Interventions ADL retraining;Functional transfer training;UE strengthening/ROM; Endurance training;Patient/family training;Equipment evaluation/education; Activityengagement; Energy conservation   Treatment Day 1   OT Frequency 3-5x/wk   Recommendation   OT Discharge Recommendation Short Term Rehab   Licensure   [place] License Number  Danielle Sharma [address], OTR/L, [place] Lic# [id number]

## 2018-07-18 NOTE — PROGRESS NOTES
Progress Note - Ivette Abad 82 y o  female MRN: [id number]    Unit/Bed#: [address] [place] [address] Encounter: [id number]        Subjective:   Chart reviewed patient evaluated discussed the   Patient did not want to go to nursing home for short-term rehab when I spoke to her this morning  But it appears he has agreed to go for 1 week for short-term rehab  Patient denies nausea vomiting does complain of generalized weakness  Review of Systems    Objective:     Vitals: Blood pressure (!) 174/65, pulse 88, temperature 98 °F (36 7 °C), temperature source Tympanic, resp  rate 18, height 5' 4" (1 626 m), weight 67 1 kg (148 lb), SpO2 94 %, not currently breastfeeding  ,Body mass index is 25 4 kg/m²        Intake/Output Summary (Last 24 hours) at 07/18/18 1641  Last data filed at 07/18/18 0757   Gross per 24 hour   Intake              360 ml   Output              800 ml   Net             -440 ml         Current Facility-Administered Medications:     acetaminophen (TYLENOL) tablet 650 mg, 650 mg, Oral, Q6H PRN, Geovani Hollingsworth MD, 650 mg at 07/17/18 0631    atorvastatin (LIPITOR) tablet 20 mg, 20 mg, Oral, After Monisha Hollingsworth MD, 20 mg at 07/17/18 1840    cyanocobalamin (VITAMIN B-12) tablet 1,000 mcg, 1,000 mcg, Oral, Daily, Geovani Hollingsworth MD, 1,000 mcg at 07/18/18 0801    cyclopentolate (CYCLOGYL) 1 % ophthalmic solution 1 drop, 1 drop, Left Eye, TID, Geovani Hollingsworth MD, 1 drop at 07/18/18 0802    dextrose 5 % and sodium chloride 0 9 % with KCl 20 mEq/L infusion (premix), 75 mL/hr, Intravenous, Continuous, Geovani Hollingsworth MD, Last Rate: 75 mL/hr at 07/17/18 1627, 75 mL/hr at 07/17/18 1627    fluticasone (FLONASE) 50 mcg/act nasal spray 1 spray, 1 spray, Nasal, BID, Geovani Hollingsworth MD, 1 spray at 07/18/18 0802    levofloxacin (LEVAQUIN) IVPB (premix) 500 mg, 500 mg, Intravenous, Q24H, Geovani Hollingsworth MD, Last Rate: 100 mL/hr at 07/17/18 1628, 500 mg at 07/17/18 1628    lisinopril (ZESTRIL) tablet 20 mg, 20 mg, Oral, Daily, Geovani Hollingsworth MD, 20 mg at 07/17/18 1627    ofloxacin (OCUFLOX) 0 3 % ophthalmic solution 1 drop, 1 drop, Left Eye, 4x Daily, Geovani Hollingsworth MD, 1 drop at 07/18/18 1059    ondansetron (ZOFRAN) injection 4 mg, 4 mg, Intravenous, Q6H PRN, Geovani Hollingsworth MD, 4 mg at 07/17/18 1025    pantoprazole (PROTONIX) injection 40 mg, 40 mg, Intravenous, Q12H [facility], Geovani Hollingsworth MD, 40 mg at 07/18/18 0802    potassium chloride (K-DUR,KLOR-CON) CR tablet 20 mEq, 20 mEq, Oral, Daily, Geovani Hollingsworth MD, 20 mEq at 07/18/18 0802    prednisoLONE acetate (PRED FORTE) 1 % ophthalmic suspension 1 drop, 1 drop, Left Eye, 4x Daily, Geovani Hollingsworth MD, 1 drop at 07/18/18 7459     Physical Exam   Constitutional: She is oriented to person, place, and time  She appears well-nourished  No distress  HENT:   Head: Normocephalic and atraumatic  Eyes: Conjunctivae are normal    Neck: Neck supple  No thyromegaly present  Cardiovascular: Normal rate and regular rhythm  Pulmonary/Chest: Effort normal and breath sounds normal  She has no wheezes  Abdominal: Bowel sounds are normal  There is no rebound and no guarding  Diffuse mild tenderness noted in the abdomen   Musculoskeletal: Normal range of motion  She exhibits no edema, tenderness or deformity  Neurological: She is alert and oriented to person, place, and time  Skin: Skin is dry  Psychiatric: She has a normal mood and affect             Lab, Imaging and culture:     Recent Results (from the past 72 hour(s))   Basic metabolic panel    Collection Time: 07/16/18  5:22 AM   Result Value Ref Range    Sodium 135 (L) 136 - 145 mmol/L    Potassium 2 9 (L) 3 5 - 5 3 mmol/L    Chloride 102 100 - 108 mmol/L    CO2 25 21 - 32 mmol/L    Anion Gap 8 4 - 13 mmol/L    BUN 7 5 - 25 mg/dL    Creatinine 0 82 0 60 - 1 30 mg/dL    Glucose 110 65 - 140 mg/dL    Calcium 8 4 8 3 - 10 1 mg/dL    eGFR 67 ml/min/1 73sq m   Magnesium    Collection Time: 07/16/18  5:22 AM   Result Value Ref Range    Magnesium 1 7 1 6 - 2 6 mg/dL   CBC and differential    Collection Time: 07/16/18  5:22 AM   Result Value Ref Range    WBC 5 99 4 31 - 10 16 Thousand/uL    RBC 3 67 (L) 3 81 - 5 12 Million/uL    Hemoglobin 11 0 (L) 11 5 - 15 4 g/dL    Hematocrit 33 6 (L) 34 8 - 46 1 %    MCV 92 82 - 98 fL    MCH 30 0 26 8 - 34 3 pg    MCHC 32 7 31 4 - 37 4 g/dL    RDW 12 8 11 6 - 15 1 %    MPV 9 1 8 9 - 12 7 fL    Platelets 587 716 - 173 Thousands/uL    nRBC 0 /100 WBCs    Neutrophils Relative 63 43 - 75 %    Immat GRANS % 1 0 - 2 %    Lymphocytes Relative 23 14 - 44 %    Monocytes Relative 12 4 - 12 %    Eosinophils Relative 1 0 - 6 %    Basophils Relative 0 0 - 1 %    Neutrophils Absolute 3 83 1 85 - 7 62 Thousands/µL    Immature Grans Absolute 0 04 0 00 - 0 20 Thousand/uL    Lymphocytes Absolute 1 36 0 60 - 4 47 Thousands/µL    Monocytes Absolute 0 71 0 17 - 1 22 Thousand/µL    Eosinophils Absolute 0 03 0 00 - 0 61 Thousand/µL    Basophils Absolute 0 02 0 00 - 0 10 Thousands/µL   CBC and differential    Collection Time: 07/17/18  5:32 AM   Result Value Ref Range    WBC 5 80 4 31 - 10 16 Thousand/uL    RBC 3 51 (L) 3 81 - 5 12 Million/uL    Hemoglobin 10 6 (L) 11 5 - 15 4 g/dL    Hematocrit 31 7 (L) 34 8 - 46 1 %    MCV 90 82 - 98 fL    MCH 30 2 26 8 - 34 3 pg    MCHC 33 4 31 4 - 37 4 g/dL    RDW 13 0 11 6 - 15 1 %    MPV 8 9 8 9 - 12 7 fL    Platelets 180 684 - 922 Thousands/uL    nRBC 0 /100 WBCs    Neutrophils Relative 53 43 - 75 %    Immat GRANS % 1 0 - 2 %    Lymphocytes Relative 30 14 - 44 %    Monocytes Relative 15 (H) 4 - 12 %    Eosinophils Relative 1 0 - 6 %    Basophils Relative 0 0 - 1 %    Neutrophils Absolute 3 09 1 85 - 7 62 Thousands/µL    Immature Grans Absolute 0 03 0 00 - 0 20 Thousand/uL    Lymphocytes Absolute 1 73 0 60 - 4 47 Thousands/µL    Monocytes Absolute 0 87 0 17 - 1 22 Thousand/µL    Eosinophils Absolute 0 07 0 00 - 0 61 Thousand/µL    Basophils Absolute 0 01 0 00 - 0 10 Thousands/µL   Basic metabolic panel    Collection Time: 07/17/18  5:32 AM   Result Value Ref Range    Sodium 133 (L) 136 - 145 mmol/L    Potassium 3 2 (L) 3 5 - 5 3 mmol/L    Chloride 102 100 - 108 mmol/L    CO2 22 21 - 32 mmol/L    Anion Gap 9 4 - 13 mmol/L    BUN 6 5 - 25 mg/dL    Creatinine 0 87 0 60 - 1 30 mg/dL    Glucose 106 65 - 140 mg/dL    Calcium 8 0 (L) 8 3 - 10 1 mg/dL    eGFR 63 ml/min/1 73sq m   UA w Reflex to Microscopic w Reflex to Culture    Collection Time: 07/17/18  9:48 AM   Result Value Ref Range    Color, UA Yellow     Clarity, UA Clear     Specific Gravity, UA 1 020 1 000 - 1 030    pH, UA 7 0 5 0 - 9 0    Leukocytes, UA Negative Negative    Nitrite, UA Negative Negative    Protein, UA Negative Negative mg/dl    Glucose, UA Negative Negative mg/dl    Ketones, UA Negative Negative mg/dl    Urobilinogen, UA 1 0 0 2, 1 0 E U /dl E U /dl    Bilirubin, UA Negative Negative    Blood, UA Moderate (A) Negative   Urine Microscopic    Collection Time: 07/17/18  9:48 AM   Result Value Ref Range    RBC, UA 0-1 (A) None Seen, 0-5 /hpf    WBC, UA 1-2 (A) None Seen, 0-5, 5-55, 5-65 /hpf    Epithelial Cells Occasional None Seen, Occasional /hpf    Bacteria, UA Occasional None Seen, Occasional /hpf   Basic metabolic panel    Collection Time: 07/18/18  6:36 AM   Result Value Ref Range    Sodium 136 136 - 145 mmol/L    Potassium 3 3 (L) 3 5 - 5 3 mmol/L    Chloride 102 100 - 108 mmol/L    CO2 24 21 - 32 mmol/L    Anion Gap 10 4 - 13 mmol/L    BUN 7 5 - 25 mg/dL    Creatinine 0 83 0 60 - 1 30 mg/dL    Glucose 100 65 - 140 mg/dL    Calcium 8 2 (L) 8 3 - 10 1 mg/dL    eGFR 66 ml/min/1 73sq m   CBC and differential    Collection Time: 07/18/18  6:36 AM   Result Value Ref Range    WBC 6 27 4 31 - 10 16 Thousand/uL    RBC 3 68 (L) 3 81 - 5 12 Million/uL    Hemoglobin 11 2 (L) 11 5 - 15 4 g/dL    Hematocrit 33 6 (L) 34 8 - 46 1 %    MCV 91 82 - 98 fL    MCH 30 4 26 8 - 34 3 pg    MCHC 33 3 31 4 - 37 4 g/dL    RDW 13 2 11 6 - 15 1 %    MPV 9 0 8 9 - 12 7 fL    Platelets 624 440 - 983 Thousands/uL    nRBC 0 /100 WBCs    Neutrophils Relative 57 43 - 75 %    Immat GRANS % 1 0 - 2 %    Lymphocytes Relative 28 14 - 44 %    Monocytes Relative 13 (H) 4 - 12 %    Eosinophils Relative 1 0 - 6 %    Basophils Relative 0 0 - 1 %    Neutrophils Absolute 3 58 1 85 - 7 62 Thousands/µL    Immature Grans Absolute 0 05 0 00 - 0 20 Thousand/uL    Lymphocytes Absolute 1 75 0 60 - 4 47 Thousands/µL    Monocytes Absolute 0 79 0 17 - 1 22 Thousand/µL    Eosinophils Absolute 0 08 0 00 - 0 61 Thousand/µL    Basophils Absolute 0 02 0 00 - 0 10 Thousands/µL       PE Study with CT Abdomen and Pelvis with contrast   Final Result      1  Emphysema  2   No evidence of pulmonary embolus or other acute abnormality in the chest    3   Hiatal hernia and generalized mural thickening of the esophagus, consistent with esophagitis or presbyesophagus  4   No acute abnormality in the abdomen or pelvis  5   Sigmoid diverticulosis without evidence of diverticulitis  Workstation performed: [id number]             Invasive Devices     Peripheral Intravenous Line            Peripheral IV 07/15/18 Right Wrist 2 days                      Assessment:  Patient with scant hematemesis most likely Diane-Cancino tear however she is still not feeling well not eating   Gastroenteritis viral syndrome  Urinary tract infection  History of hypertension  DJD  Hypercholesterolemia  Hypokalemia 3 3  Plan:  Advance diet to regular cardiac low sodium  IV fluids with KCl  KCl bolus  BMP tomorrow   IV antibiotic  PT and OT evaluation and treatment  Who recommended short-term rehab    If patient remains stable will consider discharging her back to nursing home for short-term rehab tomorrow

## 2018-07-19 VITALS
SYSTOLIC BLOOD PRESSURE: 120 MMHG | TEMPERATURE: 98.3 F | WEIGHT: 148 LBS | DIASTOLIC BLOOD PRESSURE: 74 MMHG | BODY MASS INDEX: 25.27 KG/M2 | RESPIRATION RATE: 18 BRPM | OXYGEN SATURATION: 97 % | HEIGHT: 64 IN | HEART RATE: 102 BPM

## 2018-07-19 LAB
ANION GAP SERPL CALCULATED.3IONS-SCNC: 8 MMOL/L (ref 4–13)
BUN SERPL-MCNC: 7 MG/DL (ref 5–25)
CALCIUM SERPL-MCNC: 8.2 MG/DL (ref 8.3–10.1)
CHLORIDE SERPL-SCNC: 101 MMOL/L (ref 100–108)
CO2 SERPL-SCNC: 23 MMOL/L (ref 21–32)
CREAT SERPL-MCNC: 0.89 MG/DL (ref 0.6–1.3)
GFR SERPL CREATININE-BSD FRML MDRD: 61 ML/MIN/1.73SQ M
GLUCOSE SERPL-MCNC: 104 MG/DL (ref 65–140)
POTASSIUM SERPL-SCNC: 3.7 MMOL/L (ref 3.5–5.3)
SODIUM SERPL-SCNC: 132 MMOL/L (ref 136–145)

## 2018-07-19 PROCEDURE — C9113 INJ PANTOPRAZOLE SODIUM, VIA: HCPCS | Performed by: INTERNAL MEDICINE

## 2018-07-19 PROCEDURE — 80048 BASIC METABOLIC PNL TOTAL CA: CPT | Performed by: INTERNAL MEDICINE

## 2018-07-19 RX ORDER — LEVOFLOXACIN 250 MG/1
250 TABLET ORAL DAILY
Refills: 0
Start: 2018-07-19 | End: 2018-07-24

## 2018-07-19 RX ORDER — PANTOPRAZOLE SODIUM 40 MG/1
40 TABLET, DELAYED RELEASE ORAL DAILY
Refills: 0
Start: 2018-07-19

## 2018-07-19 RX ORDER — POTASSIUM CHLORIDE 20 MEQ/1
20 TABLET, EXTENDED RELEASE ORAL DAILY
Refills: 0
Start: 2018-07-19

## 2018-07-19 RX ADMIN — Medication 1000 MCG: at 08:29

## 2018-07-19 RX ADMIN — POTASSIUM CHLORIDE 20 MEQ: 1500 TABLET, EXTENDED RELEASE ORAL at 08:29

## 2018-07-19 RX ADMIN — PANTOPRAZOLE SODIUM 40 MG: 40 INJECTION, POWDER, FOR SOLUTION INTRAVENOUS at 08:29

## 2018-07-19 RX ADMIN — OFLOXACIN 1 DROP: 3 SOLUTION/ DROPS OPHTHALMIC at 08:29

## 2018-07-19 RX ADMIN — PREDNISOLONE ACETATE 1 DROP: 10 SUSPENSION/ DROPS OPHTHALMIC at 08:29

## 2018-07-19 RX ADMIN — OFLOXACIN 1 DROP: 3 SOLUTION/ DROPS OPHTHALMIC at 11:50

## 2018-07-19 RX ADMIN — PREDNISOLONE ACETATE 1 DROP: 10 SUSPENSION/ DROPS OPHTHALMIC at 11:50

## 2018-07-19 RX ADMIN — CYCLOPENTOLATE HYDROCHLORIDE 1 DROP: 10 SOLUTION/ DROPS OPHTHALMIC at 08:29

## 2018-07-19 RX ADMIN — FLUTICASONE PROPIONATE 1 SPRAY: 50 SPRAY, METERED NASAL at 08:29

## 2018-07-19 NOTE — [place] UNIVERSAL TRANSFER FORM
[place] UNIVERSAL TRANSFER FORM  (ALL ITEMS MUST BE COMPLETED)    1  TRANSFER FROM: [address]      TRANSFER TO: [facility]     2  DATE OF TRANSFER: 7/19/2018                        TIME OF TRANSFER: 1300    3  PATIENT NAME: MESSI Urias      YOB: 1935                             GENDER: female    4  LANGUAGE:   English    5  PHYSICIAN NAME:  Emperatriz Gilman MD                   PHONE: [phone]    6  CODE STATUS: Level 1 - Full Code        Out of Hospital DNR Attached: No    7  :                                      :  Extended Emergency Contact Information  Primary Emergency Contact: [facility]  Mobile Phone: [phone]  Relation: Relative  Secondary Emergency Contact: Keaton Manning  Address: [address] [place] [address] Phone: [phone]  Mobile Phone: [phone]  Relation: Relative           [facility] Representative/Proxy:  No           Legal Guardian:  No             NAME OF:           HEALTH CARE REPRESENTATIVE/PROXY:                                         OR           LEGAL GUARDIAN, IF NOT :                                               PHONE:  (Day)           (Night)                        (Cell)    8  REASON FOR TRANSFER: (Must include brief medical history and recent changes in physical function or cognition ) GI bleed, PT/OT needed to build strength            V/S: /88 (BP Location: Right arm)   Pulse 82   Temp 99 °F (37 2 °C) (Oral)   Resp 18   Ht 5' 4" (1 626 m)   Wt 67 1 kg (148 lb)   LMP  (LMP Unknown)   SpO2 95%   Breastfeeding? No   BMI 25 40 kg/m²           PAIN: None    9  PRIMARY DIAGNOSIS: GI bleed      Secondary Diagnosis:         Pacemaker: No      Internal Defib: No          Mental Health Diagnosis (if Applicable):    10  RESTRAINTS: No     11   RESPIRATORY NEEDS: None    12  ISOLATION/PRECAUTION: None    13  ALLERGY: Patient has no known allergies  14  SENSORY:       Vision Glasses    15  SKIN CONDITION: No Wounds    16  DIET: Regular    17  IV ACCESS: None    18  PERSONAL ITEMS SENT WITH PATIENT: Glasses    19  ATTACHED DOCUMENTS: MUST ATTACH CURRENT MEDICATION INFORMATION Face Sheet, Medication Reconciliation, Labs and Discharge Summary    20  AT RISK ALERTS:Falls        HARM TO: N/A    21  WEIGHT BEARING STATUS:         Left Leg: Full        Right Leg: Full    22  MENTAL STATUS:Alert and Oriented    23  FUNCTION:        Walk: With Help        Transfer: With Help        Toilet: Self        Feed: Self    24  IMMUNIZATIONS/SCREENING:     There is no immunization history on file for this patient  25  BOWEL: Continent    26  BLADDER: Continent    27   SENDING FACILITY CONTACT: Cathryn Gonsalez                  Title: RN        Unit: 3N        Phone: [phone] (if known):        Title:        Unit:         Phone:         FORM PREFILLED BY (if applicable)       Title:       Unit:        Phone:         FORM COMPLETED BY Cathryn Gonsalez RN      Title: RN      Phone: [phone]

## 2018-07-19 NOTE — DISCHARGE SUMMARY
Discharge Summary - Julián Garcia 82 y o  female MRN: [id number]    Unit/Bed#: [address] [place] [address] Encounter: [id number]    Admission Date: 7/14/2018     Admitting Diagnosis: Hypokalemia [E87 6]  Gastroenteritis [K52 9]  UTI (urinary tract infection) [N39 0]  Weakness [R53 1]  GI bleed [K92 2]    HPI:  Patient is a 21-year-old white female with a history of hypertension has been brought to the emergency room with chief complaint of nausea vomiting and generalized weakness  Patient also vomited small amount of dark blood in the emergency room  Workup in the emergency room revealed urinary tract infection and patient subsequently admitted for further evaluation and treatment    Procedures Performed: No orders of the defined types were placed in this encounter  Hospital Course:  Patient was admitted to general medical floor she is appearing clinically dehydrated was given intravenous fluids  She was also started on IV antibiotics Levaquin for the urinary tract infection  Hemoglobin and hematocrit monitored closely for any GI bleed  She was seen by the gastroenterologist who felt the small amount of dark blood probably was secondary to Diane-Cancino tear doubt ulcers  There was no drop in the hemoglobin no further vomiting  Patient was initially kept NPO and subsequently started on clear liquid and it was advanced to regular diet which she has tolerated it very well  It was also felt patient might have had gastroenteritis while syndrome  Patient's potassium level was running on the low side and she received supplements for the same  She received physical therapy occupational therapy and they recommended short-term rehab for her  Initially patient refused further short-term rehab however after extensive discussion with a nephew who spoke to her and finally she agreed for short-term rehab    Since the present time patient condition stable she is being discharged condition of the patient at the time of discharge is stable her vital signs are stable blood pressure is slightly on the high side heart S1-S2 heard lungs are clinically clear abdomen soft nontender extremities there is no edema  Please see after visit summary for the medications in the discharge list she has been sent to the nursing home on Levaquin for 5 more days and also Protonix for few days because of the Diane-Cancino tear      Complications:  None    Labs:  Recent Results (from the past 72 hour(s))   CBC and differential    Collection Time: 07/17/18  5:32 AM   Result Value Ref Range    WBC 5 80 4 31 - 10 16 Thousand/uL    RBC 3 51 (L) 3 81 - 5 12 Million/uL    Hemoglobin 10 6 (L) 11 5 - 15 4 g/dL    Hematocrit 31 7 (L) 34 8 - 46 1 %    MCV 90 82 - 98 fL    MCH 30 2 26 8 - 34 3 pg    MCHC 33 4 31 4 - 37 4 g/dL    RDW 13 0 11 6 - 15 1 %    MPV 8 9 8 9 - 12 7 fL    Platelets 851 180 - 593 Thousands/uL    nRBC 0 /100 WBCs    Neutrophils Relative 53 43 - 75 %    Immat GRANS % 1 0 - 2 %    Lymphocytes Relative 30 14 - 44 %    Monocytes Relative 15 (H) 4 - 12 %    Eosinophils Relative 1 0 - 6 %    Basophils Relative 0 0 - 1 %    Neutrophils Absolute 3 09 1 85 - 7 62 Thousands/µL    Immature Grans Absolute 0 03 0 00 - 0 20 Thousand/uL    Lymphocytes Absolute 1 73 0 60 - 4 47 Thousands/µL    Monocytes Absolute 0 87 0 17 - 1 22 Thousand/µL    Eosinophils Absolute 0 07 0 00 - 0 61 Thousand/µL    Basophils Absolute 0 01 0 00 - 0 10 Thousands/µL   Basic metabolic panel    Collection Time: 07/17/18  5:32 AM   Result Value Ref Range    Sodium 133 (L) 136 - 145 mmol/L    Potassium 3 2 (L) 3 5 - 5 3 mmol/L    Chloride 102 100 - 108 mmol/L    CO2 22 21 - 32 mmol/L    Anion Gap 9 4 - 13 mmol/L    BUN 6 5 - 25 mg/dL    Creatinine 0 87 0 60 - 1 30 mg/dL    Glucose 106 65 - 140 mg/dL    Calcium 8 0 (L) 8 3 - 10 1 mg/dL    eGFR 63 ml/min/1 73sq m   UA w Reflex to Microscopic w Reflex to Culture    Collection Time: 07/17/18  9:48 AM   Result Value Ref Range    Color, UA Yellow     Clarity, UA Clear     Specific Gravity, UA 1 020 1 000 - 1 030    pH, UA 7 0 5 0 - 9 0    Leukocytes, UA Negative Negative    Nitrite, UA Negative Negative    Protein, UA Negative Negative mg/dl    Glucose, UA Negative Negative mg/dl    Ketones, UA Negative Negative mg/dl    Urobilinogen, UA 1 0 0 2, 1 0 E U /dl E U /dl    Bilirubin, UA Negative Negative    Blood, UA Moderate (A) Negative   Urine Microscopic    Collection Time: 07/17/18  9:48 AM   Result Value Ref Range    RBC, UA 0-1 (A) None Seen, 0-5 /hpf    WBC, UA 1-2 (A) None Seen, 0-5, 5-55, 5-65 /hpf    Epithelial Cells Occasional None Seen, Occasional /hpf    Bacteria, UA Occasional None Seen, Occasional /hpf   Basic metabolic panel    Collection Time: 07/18/18  6:36 AM   Result Value Ref Range    Sodium 136 136 - 145 mmol/L    Potassium 3 3 (L) 3 5 - 5 3 mmol/L    Chloride 102 100 - 108 mmol/L    CO2 24 21 - 32 mmol/L    Anion Gap 10 4 - 13 mmol/L    BUN 7 5 - 25 mg/dL    Creatinine 0 83 0 60 - 1 30 mg/dL    Glucose 100 65 - 140 mg/dL    Calcium 8 2 (L) 8 3 - 10 1 mg/dL    eGFR 66 ml/min/1 73sq m   CBC and differential    Collection Time: 07/18/18  6:36 AM   Result Value Ref Range    WBC 6 27 4 31 - 10 16 Thousand/uL    RBC 3 68 (L) 3 81 - 5 12 Million/uL    Hemoglobin 11 2 (L) 11 5 - 15 4 g/dL    Hematocrit 33 6 (L) 34 8 - 46 1 %    MCV 91 82 - 98 fL    MCH 30 4 26 8 - 34 3 pg    MCHC 33 3 31 4 - 37 4 g/dL    RDW 13 2 11 6 - 15 1 %    MPV 9 0 8 9 - 12 7 fL    Platelets 595 826 - 584 Thousands/uL    nRBC 0 /100 WBCs    Neutrophils Relative 57 43 - 75 %    Immat GRANS % 1 0 - 2 %    Lymphocytes Relative 28 14 - 44 %    Monocytes Relative 13 (H) 4 - 12 %    Eosinophils Relative 1 0 - 6 %    Basophils Relative 0 0 - 1 %    Neutrophils Absolute 3 58 1 85 - 7 62 Thousands/µL    Immature Grans Absolute 0 05 0 00 - 0 20 Thousand/uL    Lymphocytes Absolute 1 75 0 60 - 4 47 Thousands/µL    Monocytes Absolute 0 79 0 17 - 1 22 Thousand/µL    Eosinophils Absolute 0 08 0 00 - 0 61 Thousand/µL    Basophils Absolute 0 02 0 00 - 0 10 Thousands/µL   Basic metabolic panel    Collection Time: 07/19/18  6:47 AM   Result Value Ref Range    Sodium 132 (L) 136 - 145 mmol/L    Potassium 3 7 3 5 - 5 3 mmol/L    Chloride 101 100 - 108 mmol/L    CO2 23 21 - 32 mmol/L    Anion Gap 8 4 - 13 mmol/L    BUN 7 5 - 25 mg/dL    Creatinine 0 89 0 60 - 1 30 mg/dL    Glucose 104 65 - 140 mg/dL    Calcium 8 2 (L) 8 3 - 10 1 mg/dL    eGFR 61 ml/min/1 73sq m     PE Study with CT Abdomen and Pelvis with contrast   Final Result      1  Emphysema  2   No evidence of pulmonary embolus or other acute abnormality in the chest    3   Hiatal hernia and generalized mural thickening of the esophagus, consistent with esophagitis or presbyesophagus  4   No acute abnormality in the abdomen or pelvis  5   Sigmoid diverticulosis without evidence of diverticulitis  Workstation performed: [id number]           Invasive Devices     Peripheral Intravenous Line            Peripheral IV 07/18/18 Left Forearm less than 1 day                Discharge Diagnosis:   Gastroenteritis viral syndrome  Patient with scant hematemesis most likely Diane-Cancino tear  Urinary tract infection  Hypertension  Degenerative joint disease  Hypokalemia resolved  Hypercholesterolemia  Degenerative joint disease    Plan:  Patient will be discharged on the medications mentioned in the after visit summary the new medications that are added include Levaquin for 5 days and Protonix will be for few days  Patient will be going to nursing home for short-term rehab  Condition at Discharge: fair     Discharge instructions/Information to patient and family:   See after visit summary for information provided to patient and family  Provisions for Follow-Up Care:  See after visit summary for information related to follow-up care and any pertinent home health orders        Disposition: Skilled nursing facility at [place] [facility]        Discharge Statement   I spent a 30 minutes discharging the patient  This time was spent on the day of discharge  I had direct contact with the patient on the day of discharge  Additional documentation is required if more than 30 minutes were spent on discharge  Discharge Medications:  See after visit summary for reconciled discharge medications provided to patient and family

## 2018-07-19 NOTE — PLAN OF CARE
DISCHARGE PLANNING     Discharge to home or other facility with appropriate resources Adequate for Discharge        DISCHARGE PLANNING - CARE MANAGEMENT     Discharge to post-acute care or home with appropriate resources Adequate for Discharge        INFECTION - ADULT     Absence or prevention of progression during hospitalization Adequate for Discharge        Knowledge Deficit     Patient/family/caregiver demonstrates understanding of disease process, treatment plan, medications, and discharge instructions Adequate for Discharge        PAIN - ADULT     Verbalizes/displays adequate comfort level or baseline comfort level Adequate for Discharge        Potential for Falls     Patient will remain free of falls Adequate for Discharge        Prexisting or High Potential for Compromised Skin Integrity     Skin integrity is maintained or improved Adequate for Discharge        SAFETY ADULT     Maintain or return to baseline ADL function Adequate for Discharge     Maintain or return mobility status to optimal level Adequate for Discharge

## 2018-07-19 NOTE — NURSING NOTE
Pt d/c from unit to [address]  D/c instructions and medications reviewed with RN at [facility]  Report called in to Corina Shoemaker at [facility]  IV removed prior to d/c  Pt left the unit via stretcher accompanied by the transport team from [facility]

## 2019-01-08 ENCOUNTER — TRANSCRIBE ORDERS (OUTPATIENT)
Dept: ADMINISTRATIVE | Facility: HOSPITAL | Age: 84
End: 2019-01-08

## 2019-01-08 DIAGNOSIS — Z12.39 SCREENING BREAST EXAMINATION: Primary | ICD-10-CM

## 2019-01-10 ENCOUNTER — HOSPITAL ENCOUNTER (OUTPATIENT)
Dept: RADIOLOGY | Facility: HOSPITAL | Age: 84
Discharge: HOME | End: 2019-01-10
Attending: INTERNAL MEDICINE
Payer: MEDICARE

## 2019-01-10 VITALS — HEIGHT: 64 IN | BODY MASS INDEX: 25.27 KG/M2 | WEIGHT: 148 LBS

## 2019-01-10 DIAGNOSIS — Z12.39 SCREENING BREAST EXAMINATION: ICD-10-CM

## 2019-01-10 PROCEDURE — 77067 SCR MAMMO BI INCL CAD: CPT

## 2019-05-06 ENCOUNTER — APPOINTMENT (OUTPATIENT)
Dept: LAB | Facility: CLINIC | Age: 84
End: 2019-05-06
Payer: MEDICARE

## 2019-05-06 ENCOUNTER — TRANSCRIBE ORDERS (OUTPATIENT)
Dept: ADMINISTRATIVE | Facility: HOSPITAL | Age: 84
End: 2019-05-06

## 2019-05-06 DIAGNOSIS — I51.9 MYXEDEMA HEART DISEASE: ICD-10-CM

## 2019-05-06 DIAGNOSIS — E03.9 MYXEDEMA HEART DISEASE: ICD-10-CM

## 2019-05-06 DIAGNOSIS — D64.9 RELATIVE ANEMIA: ICD-10-CM

## 2019-05-06 DIAGNOSIS — Z79.01 LONG TERM (CURRENT) USE OF ANTICOAGULANTS: ICD-10-CM

## 2019-05-06 DIAGNOSIS — E55.9 AVITAMINOSIS D: ICD-10-CM

## 2019-05-06 DIAGNOSIS — N39.0 LOWER URINARY TRACT INFECTIOUS DISEASE: ICD-10-CM

## 2019-05-06 DIAGNOSIS — E87.8 DILATED CARDIOMYOPATHY SECONDARY TO ELECTROLYTE DEFICIENCY (HCC): ICD-10-CM

## 2019-05-06 DIAGNOSIS — E78.00 PURE HYPERCHOLESTEROLEMIA: ICD-10-CM

## 2019-05-06 DIAGNOSIS — M10.9 PODAGRA: ICD-10-CM

## 2019-05-06 DIAGNOSIS — R19.7 DIARRHEA OF PRESUMED INFECTIOUS ORIGIN: ICD-10-CM

## 2019-05-06 DIAGNOSIS — E29.1 3-OXO-5 ALPHA-STEROID DELTA 4-DEHYDROGENASE DEFICIENCY: ICD-10-CM

## 2019-05-06 DIAGNOSIS — R94.5 NONSPECIFIC ABNORMAL RESULTS OF LIVER FUNCTION STUDY: ICD-10-CM

## 2019-05-06 DIAGNOSIS — M08.90 JUVENILE CHRONIC POLYARTHRITIS (HCC): ICD-10-CM

## 2019-05-06 DIAGNOSIS — I43 DILATED CARDIOMYOPATHY SECONDARY TO ELECTROLYTE DEFICIENCY (HCC): ICD-10-CM

## 2019-05-06 DIAGNOSIS — D51.9 ANEMIA DUE TO VITAMIN B12 DEFICIENCY, UNSPECIFIED B12 DEFICIENCY TYPE: ICD-10-CM

## 2019-05-06 DIAGNOSIS — D50.9 NORMOCYTIC HYPOCHROMIC ANEMIA: Primary | ICD-10-CM

## 2019-05-06 DIAGNOSIS — D50.9 NORMOCYTIC HYPOCHROMIC ANEMIA: ICD-10-CM

## 2019-05-06 DIAGNOSIS — I10 ESSENTIAL HYPERTENSION, MALIGNANT: ICD-10-CM

## 2019-05-06 DIAGNOSIS — H35.52: ICD-10-CM

## 2019-05-06 LAB
25(OH)D3 SERPL-MCNC: 35.6 NG/ML (ref 30–100)
ALBUMIN SERPL BCP-MCNC: 3.6 G/DL (ref 3.5–5)
ALP SERPL-CCNC: 74 U/L (ref 46–116)
ALT SERPL W P-5'-P-CCNC: 20 U/L (ref 12–78)
ANION GAP SERPL CALCULATED.3IONS-SCNC: 4 MMOL/L (ref 4–13)
AST SERPL W P-5'-P-CCNC: 14 U/L (ref 5–45)
BACTERIA UR QL AUTO: ABNORMAL /HPF
BASOPHILS # BLD AUTO: 0.06 THOUSANDS/ΜL (ref 0–0.1)
BASOPHILS NFR BLD AUTO: 1 % (ref 0–1)
BILIRUB SERPL-MCNC: 0.55 MG/DL (ref 0.2–1)
BILIRUB UR QL STRIP: NEGATIVE
BUN SERPL-MCNC: 13 MG/DL (ref 5–25)
CALCIUM SERPL-MCNC: 8.8 MG/DL (ref 8.3–10.1)
CHLORIDE SERPL-SCNC: 110 MMOL/L (ref 100–108)
CHOLEST SERPL-MCNC: 159 MG/DL (ref 50–200)
CLARITY UR: ABNORMAL
CO2 SERPL-SCNC: 27 MMOL/L (ref 21–32)
COLOR UR: YELLOW
CREAT SERPL-MCNC: 0.85 MG/DL (ref 0.6–1.3)
EOSINOPHIL # BLD AUTO: 0.21 THOUSAND/ΜL (ref 0–0.61)
EOSINOPHIL NFR BLD AUTO: 4 % (ref 0–6)
ERYTHROCYTE [DISTWIDTH] IN BLOOD BY AUTOMATED COUNT: 13.3 % (ref 11.6–15.1)
EST. AVERAGE GLUCOSE BLD GHB EST-MCNC: 128 MG/DL
GFR SERPL CREATININE-BSD FRML MDRD: 64 ML/MIN/1.73SQ M
GLUCOSE P FAST SERPL-MCNC: 84 MG/DL (ref 65–99)
GLUCOSE UR STRIP-MCNC: NEGATIVE MG/DL
HBA1C MFR BLD: 6.1 % (ref 4.2–6.3)
HCT VFR BLD AUTO: 41.2 % (ref 34.8–46.1)
HDLC SERPL-MCNC: 40 MG/DL (ref 40–60)
HGB BLD-MCNC: 13.3 G/DL (ref 11.5–15.4)
HGB UR QL STRIP.AUTO: NEGATIVE
HYALINE CASTS #/AREA URNS LPF: ABNORMAL /LPF
IMM GRANULOCYTES # BLD AUTO: 0.01 THOUSAND/UL (ref 0–0.2)
IMM GRANULOCYTES NFR BLD AUTO: 0 % (ref 0–2)
KETONES UR STRIP-MCNC: NEGATIVE MG/DL
LDLC SERPL CALC-MCNC: 86 MG/DL (ref 0–100)
LEUKOCYTE ESTERASE UR QL STRIP: ABNORMAL
LYMPHOCYTES # BLD AUTO: 1.49 THOUSANDS/ΜL (ref 0.6–4.47)
LYMPHOCYTES NFR BLD AUTO: 30 % (ref 14–44)
MCH RBC QN AUTO: 30.9 PG (ref 26.8–34.3)
MCHC RBC AUTO-ENTMCNC: 32.3 G/DL (ref 31.4–37.4)
MCV RBC AUTO: 96 FL (ref 82–98)
MONOCYTES # BLD AUTO: 0.59 THOUSAND/ΜL (ref 0.17–1.22)
MONOCYTES NFR BLD AUTO: 12 % (ref 4–12)
NEUTROPHILS # BLD AUTO: 2.55 THOUSANDS/ΜL (ref 1.85–7.62)
NEUTS SEG NFR BLD AUTO: 53 % (ref 43–75)
NITRITE UR QL STRIP: POSITIVE
NON-SQ EPI CELLS URNS QL MICRO: ABNORMAL /HPF
NONHDLC SERPL-MCNC: 119 MG/DL
NRBC BLD AUTO-RTO: 0 /100 WBCS
PH UR STRIP.AUTO: 7.5 [PH]
PLATELET # BLD AUTO: 286 THOUSANDS/UL (ref 149–390)
PMV BLD AUTO: 9.5 FL (ref 8.9–12.7)
POTASSIUM SERPL-SCNC: 4.1 MMOL/L (ref 3.5–5.3)
PROT SERPL-MCNC: 7.5 G/DL (ref 6.4–8.2)
PROT UR STRIP-MCNC: NEGATIVE MG/DL
RBC # BLD AUTO: 4.3 MILLION/UL (ref 3.81–5.12)
RBC #/AREA URNS AUTO: ABNORMAL /HPF
SODIUM SERPL-SCNC: 141 MMOL/L (ref 136–145)
SP GR UR STRIP.AUTO: 1.01 (ref 1–1.03)
T4 FREE SERPL-MCNC: 1.01 NG/DL (ref 0.76–1.46)
TRIGL SERPL-MCNC: 163 MG/DL
TSH SERPL DL<=0.05 MIU/L-ACNC: 1.91 UIU/ML (ref 0.36–3.74)
UROBILINOGEN UR QL STRIP.AUTO: 0.2 E.U./DL
WBC # BLD AUTO: 4.91 THOUSAND/UL (ref 4.31–10.16)
WBC #/AREA URNS AUTO: ABNORMAL /HPF

## 2019-05-06 PROCEDURE — 80061 LIPID PANEL: CPT

## 2019-05-06 PROCEDURE — 82306 VITAMIN D 25 HYDROXY: CPT

## 2019-05-06 PROCEDURE — 36415 COLL VENOUS BLD VENIPUNCTURE: CPT

## 2019-05-06 PROCEDURE — 80053 COMPREHEN METABOLIC PANEL: CPT

## 2019-05-06 PROCEDURE — 83036 HEMOGLOBIN GLYCOSYLATED A1C: CPT

## 2019-05-06 PROCEDURE — 81001 URINALYSIS AUTO W/SCOPE: CPT | Performed by: INTERNAL MEDICINE

## 2019-05-06 PROCEDURE — 84443 ASSAY THYROID STIM HORMONE: CPT

## 2019-05-06 PROCEDURE — 85025 COMPLETE CBC W/AUTO DIFF WBC: CPT

## 2019-05-06 PROCEDURE — 84439 ASSAY OF FREE THYROXINE: CPT

## 2019-07-11 ENCOUNTER — TRANSCRIBE ORDERS (OUTPATIENT)
Dept: ADMINISTRATIVE | Facility: HOSPITAL | Age: 84
End: 2019-07-11

## 2019-07-11 ENCOUNTER — APPOINTMENT (OUTPATIENT)
Dept: LAB | Facility: CLINIC | Age: 84
End: 2019-07-11
Payer: MEDICARE

## 2019-07-11 DIAGNOSIS — E11.9 DIABETES MELLITUS WITHOUT COMPLICATION (HCC): ICD-10-CM

## 2019-07-11 DIAGNOSIS — E78.00 PURE HYPERCHOLESTEROLEMIA: ICD-10-CM

## 2019-07-11 DIAGNOSIS — I51.9 MYXEDEMA HEART DISEASE: ICD-10-CM

## 2019-07-11 DIAGNOSIS — I10 ESSENTIAL HYPERTENSION, MALIGNANT: ICD-10-CM

## 2019-07-11 DIAGNOSIS — N39.0 LOWER URINARY TRACT INFECTIOUS DISEASE: ICD-10-CM

## 2019-07-11 DIAGNOSIS — E55.9 AVITAMINOSIS D: ICD-10-CM

## 2019-07-11 DIAGNOSIS — E03.9 MYXEDEMA HEART DISEASE: ICD-10-CM

## 2019-07-11 DIAGNOSIS — E55.9 AVITAMINOSIS D: Primary | ICD-10-CM

## 2019-07-11 DIAGNOSIS — D64.9 RELATIVE ANEMIA: ICD-10-CM

## 2019-07-11 LAB
BACTERIA UR QL AUTO: ABNORMAL /HPF
BILIRUB UR QL STRIP: NEGATIVE
CLARITY UR: ABNORMAL
COLOR UR: YELLOW
GLUCOSE UR STRIP-MCNC: NEGATIVE MG/DL
HGB UR QL STRIP.AUTO: ABNORMAL
KETONES UR STRIP-MCNC: NEGATIVE MG/DL
LEUKOCYTE ESTERASE UR QL STRIP: ABNORMAL
NITRITE UR QL STRIP: POSITIVE
NON-SQ EPI CELLS URNS QL MICRO: ABNORMAL /HPF
PH UR STRIP.AUTO: 7 [PH]
PROT UR STRIP-MCNC: NEGATIVE MG/DL
RBC #/AREA URNS AUTO: ABNORMAL /HPF
SP GR UR STRIP.AUTO: 1.01 (ref 1–1.03)
UROBILINOGEN UR QL STRIP.AUTO: 0.2 E.U./DL
WBC #/AREA URNS AUTO: ABNORMAL /HPF

## 2019-07-11 PROCEDURE — 81001 URINALYSIS AUTO W/SCOPE: CPT | Performed by: INTERNAL MEDICINE

## 2019-07-11 PROCEDURE — 87086 URINE CULTURE/COLONY COUNT: CPT

## 2019-07-11 PROCEDURE — 87186 SC STD MICRODIL/AGAR DIL: CPT

## 2019-07-11 PROCEDURE — 87077 CULTURE AEROBIC IDENTIFY: CPT

## 2019-07-13 LAB
BACTERIA UR CULT: ABNORMAL
BACTERIA UR CULT: ABNORMAL

## 2020-01-02 ENCOUNTER — TRANSCRIBE ORDERS (OUTPATIENT)
Dept: ADMINISTRATIVE | Facility: HOSPITAL | Age: 85
End: 2020-01-02

## 2020-01-02 DIAGNOSIS — M81.0 OSTEOPOROSIS, UNSPECIFIED OSTEOPOROSIS TYPE, UNSPECIFIED PATHOLOGICAL FRACTURE PRESENCE: ICD-10-CM

## 2020-01-02 DIAGNOSIS — Z12.31 VISIT FOR SCREENING MAMMOGRAM: Primary | ICD-10-CM

## 2020-02-26 ENCOUNTER — HOSPITAL ENCOUNTER (OUTPATIENT)
Dept: RADIOLOGY | Facility: HOSPITAL | Age: 85
Discharge: HOME | End: 2020-02-26
Attending: INTERNAL MEDICINE
Payer: MEDICARE

## 2020-02-26 DIAGNOSIS — M81.0 OSTEOPOROSIS, UNSPECIFIED OSTEOPOROSIS TYPE, UNSPECIFIED PATHOLOGICAL FRACTURE PRESENCE: ICD-10-CM

## 2020-02-26 DIAGNOSIS — Z12.31 VISIT FOR SCREENING MAMMOGRAM: ICD-10-CM

## 2020-02-26 PROCEDURE — 77080 DXA BONE DENSITY AXIAL: CPT

## 2020-02-26 PROCEDURE — 77067 SCR MAMMO BI INCL CAD: CPT

## 2020-06-25 ENCOUNTER — APPOINTMENT (OUTPATIENT)
Dept: LAB | Facility: CLINIC | Age: 85
End: 2020-06-25
Payer: MEDICARE

## 2020-06-25 ENCOUNTER — TRANSCRIBE ORDERS (OUTPATIENT)
Dept: LAB | Facility: CLINIC | Age: 85
End: 2020-06-25

## 2020-06-25 DIAGNOSIS — I10 ESSENTIAL HYPERTENSION, MALIGNANT: ICD-10-CM

## 2020-06-25 DIAGNOSIS — E03.9 MYXEDEMA HEART DISEASE: ICD-10-CM

## 2020-06-25 DIAGNOSIS — I51.9 MYXEDEMA HEART DISEASE: ICD-10-CM

## 2020-06-25 DIAGNOSIS — N39.0 LOWER URINARY TRACT INFECTIOUS DISEASE: ICD-10-CM

## 2020-06-25 DIAGNOSIS — D64.9 RELATIVE ANEMIA: ICD-10-CM

## 2020-06-25 DIAGNOSIS — E11.9 DIABETES MELLITUS WITHOUT COMPLICATION (HCC): Primary | ICD-10-CM

## 2020-06-25 DIAGNOSIS — E78.00 PURE HYPERCHOLESTEROLEMIA: ICD-10-CM

## 2020-06-25 DIAGNOSIS — E11.9 DIABETES MELLITUS WITHOUT COMPLICATION (HCC): ICD-10-CM

## 2020-06-25 DIAGNOSIS — E55.9 VITAMIN D DEFICIENCY DISEASE: ICD-10-CM

## 2020-06-25 LAB
25(OH)D3 SERPL-MCNC: 59.8 NG/ML (ref 30–100)
ALBUMIN SERPL BCP-MCNC: 3.7 G/DL (ref 3.5–5)
ALP SERPL-CCNC: 65 U/L (ref 46–116)
ALT SERPL W P-5'-P-CCNC: 16 U/L (ref 12–78)
ANION GAP SERPL CALCULATED.3IONS-SCNC: 5 MMOL/L (ref 4–13)
AST SERPL W P-5'-P-CCNC: 15 U/L (ref 5–45)
BACTERIA UR QL AUTO: ABNORMAL /HPF
BASOPHILS # BLD AUTO: 0.05 THOUSANDS/ΜL (ref 0–0.1)
BASOPHILS NFR BLD AUTO: 1 % (ref 0–1)
BILIRUB SERPL-MCNC: 0.58 MG/DL (ref 0.2–1)
BILIRUB UR QL STRIP: NEGATIVE
BUN SERPL-MCNC: 11 MG/DL (ref 5–25)
CALCIUM SERPL-MCNC: 8.9 MG/DL (ref 8.3–10.1)
CHLORIDE SERPL-SCNC: 109 MMOL/L (ref 100–108)
CHOLEST SERPL-MCNC: 141 MG/DL (ref 50–200)
CLARITY UR: ABNORMAL
CO2 SERPL-SCNC: 25 MMOL/L (ref 21–32)
COLOR UR: YELLOW
CREAT SERPL-MCNC: 0.84 MG/DL (ref 0.6–1.3)
EOSINOPHIL # BLD AUTO: 0.16 THOUSAND/ΜL (ref 0–0.61)
EOSINOPHIL NFR BLD AUTO: 3 % (ref 0–6)
ERYTHROCYTE [DISTWIDTH] IN BLOOD BY AUTOMATED COUNT: 13.1 % (ref 11.6–15.1)
EST. AVERAGE GLUCOSE BLD GHB EST-MCNC: 108 MG/DL
GFR SERPL CREATININE-BSD FRML MDRD: 64 ML/MIN/1.73SQ M
GLUCOSE P FAST SERPL-MCNC: 85 MG/DL (ref 65–99)
GLUCOSE UR STRIP-MCNC: NEGATIVE MG/DL
HBA1C MFR BLD: 5.4 %
HCT VFR BLD AUTO: 39.1 % (ref 34.8–46.1)
HDLC SERPL-MCNC: 45 MG/DL
HGB BLD-MCNC: 12.7 G/DL (ref 11.5–15.4)
HGB UR QL STRIP.AUTO: NEGATIVE
HYALINE CASTS #/AREA URNS LPF: ABNORMAL /LPF
IMM GRANULOCYTES # BLD AUTO: 0.01 THOUSAND/UL (ref 0–0.2)
IMM GRANULOCYTES NFR BLD AUTO: 0 % (ref 0–2)
KETONES UR STRIP-MCNC: NEGATIVE MG/DL
LDLC SERPL CALC-MCNC: 72 MG/DL (ref 0–100)
LEUKOCYTE ESTERASE UR QL STRIP: ABNORMAL
LYMPHOCYTES # BLD AUTO: 1.67 THOUSANDS/ΜL (ref 0.6–4.47)
LYMPHOCYTES NFR BLD AUTO: 32 % (ref 14–44)
MCH RBC QN AUTO: 31.3 PG (ref 26.8–34.3)
MCHC RBC AUTO-ENTMCNC: 32.5 G/DL (ref 31.4–37.4)
MCV RBC AUTO: 96 FL (ref 82–98)
MONOCYTES # BLD AUTO: 0.62 THOUSAND/ΜL (ref 0.17–1.22)
MONOCYTES NFR BLD AUTO: 12 % (ref 4–12)
NEUTROPHILS # BLD AUTO: 2.74 THOUSANDS/ΜL (ref 1.85–7.62)
NEUTS SEG NFR BLD AUTO: 52 % (ref 43–75)
NITRITE UR QL STRIP: NEGATIVE
NON-SQ EPI CELLS URNS QL MICRO: ABNORMAL /HPF
NONHDLC SERPL-MCNC: 96 MG/DL
NRBC BLD AUTO-RTO: 0 /100 WBCS
PH UR STRIP.AUTO: 7.5 [PH]
PLATELET # BLD AUTO: 283 THOUSANDS/UL (ref 149–390)
PMV BLD AUTO: 9.6 FL (ref 8.9–12.7)
POTASSIUM SERPL-SCNC: 3.6 MMOL/L (ref 3.5–5.3)
PROT SERPL-MCNC: 7 G/DL (ref 6.4–8.2)
PROT UR STRIP-MCNC: NEGATIVE MG/DL
RBC # BLD AUTO: 4.06 MILLION/UL (ref 3.81–5.12)
RBC #/AREA URNS AUTO: ABNORMAL /HPF
SODIUM SERPL-SCNC: 139 MMOL/L (ref 136–145)
SP GR UR STRIP.AUTO: 1.01 (ref 1–1.03)
T4 FREE SERPL-MCNC: 1.14 NG/DL (ref 0.76–1.46)
TRIGL SERPL-MCNC: 122 MG/DL
TSH SERPL DL<=0.05 MIU/L-ACNC: 2.23 UIU/ML (ref 0.36–3.74)
UROBILINOGEN UR QL STRIP.AUTO: 0.2 E.U./DL
WBC # BLD AUTO: 5.25 THOUSAND/UL (ref 4.31–10.16)
WBC #/AREA URNS AUTO: ABNORMAL /HPF

## 2020-06-25 PROCEDURE — 36415 COLL VENOUS BLD VENIPUNCTURE: CPT

## 2020-06-25 PROCEDURE — 80053 COMPREHEN METABOLIC PANEL: CPT

## 2020-06-25 PROCEDURE — 83036 HEMOGLOBIN GLYCOSYLATED A1C: CPT

## 2020-06-25 PROCEDURE — 81001 URINALYSIS AUTO W/SCOPE: CPT

## 2020-06-25 PROCEDURE — 85025 COMPLETE CBC W/AUTO DIFF WBC: CPT

## 2020-06-25 PROCEDURE — 84443 ASSAY THYROID STIM HORMONE: CPT

## 2020-06-25 PROCEDURE — 80061 LIPID PANEL: CPT

## 2020-06-25 PROCEDURE — 84439 ASSAY OF FREE THYROXINE: CPT

## 2020-06-25 PROCEDURE — 82306 VITAMIN D 25 HYDROXY: CPT

## 2021-02-25 ENCOUNTER — IMMUNIZATIONS (OUTPATIENT)
Dept: FAMILY MEDICINE CLINIC | Facility: HOSPITAL | Age: 86
End: 2021-02-25

## 2021-02-25 DIAGNOSIS — Z23 ENCOUNTER FOR IMMUNIZATION: Primary | ICD-10-CM

## 2021-02-25 PROCEDURE — 0011A SARS-COV-2 / COVID-19 MRNA VACCINE (MODERNA) 100 MCG: CPT

## 2021-02-25 PROCEDURE — 91301 SARS-COV-2 / COVID-19 MRNA VACCINE (MODERNA) 100 MCG: CPT

## 2021-03-25 ENCOUNTER — IMMUNIZATIONS (OUTPATIENT)
Dept: FAMILY MEDICINE CLINIC | Facility: HOSPITAL | Age: 86
End: 2021-03-25

## 2021-03-25 DIAGNOSIS — Z23 ENCOUNTER FOR IMMUNIZATION: Primary | ICD-10-CM

## 2021-03-25 PROCEDURE — 91301 SARS-COV-2 / COVID-19 MRNA VACCINE (MODERNA) 100 MCG: CPT

## 2021-03-25 PROCEDURE — 0012A SARS-COV-2 / COVID-19 MRNA VACCINE (MODERNA) 100 MCG: CPT

## 2021-05-03 ENCOUNTER — TRANSCRIBE ORDERS (OUTPATIENT)
Dept: ADMINISTRATIVE | Facility: HOSPITAL | Age: 86
End: 2021-05-03

## 2021-05-03 DIAGNOSIS — Z12.31 ENCOUNTER FOR SCREENING MAMMOGRAM FOR MALIGNANT NEOPLASM OF BREAST: Primary | ICD-10-CM

## 2021-05-07 ENCOUNTER — HOSPITAL ENCOUNTER (OUTPATIENT)
Dept: RADIOLOGY | Facility: HOSPITAL | Age: 86
Discharge: HOME | End: 2021-05-07
Attending: INTERNAL MEDICINE
Payer: MEDICARE

## 2021-05-07 DIAGNOSIS — Z12.31 ENCOUNTER FOR SCREENING MAMMOGRAM FOR MALIGNANT NEOPLASM OF BREAST: ICD-10-CM

## 2021-05-07 PROCEDURE — 77063 BREAST TOMOSYNTHESIS BI: CPT

## 2021-05-07 PROCEDURE — 77067 SCR MAMMO BI INCL CAD: CPT

## 2021-05-27 ENCOUNTER — TRANSCRIBE ORDERS (OUTPATIENT)
Dept: ADMINISTRATIVE | Facility: HOSPITAL | Age: 86
End: 2021-05-27

## 2021-05-27 ENCOUNTER — APPOINTMENT (OUTPATIENT)
Dept: LAB | Facility: CLINIC | Age: 86
End: 2021-05-27
Payer: MEDICARE

## 2021-05-27 DIAGNOSIS — E78.00 PURE HYPERCHOLESTEROLEMIA: ICD-10-CM

## 2021-05-27 DIAGNOSIS — E11.9 TYPE 2 DIABETES MELLITUS WITHOUT COMPLICATION, WITHOUT LONG-TERM CURRENT USE OF INSULIN (HCC): ICD-10-CM

## 2021-05-27 DIAGNOSIS — E55.9 VITAMIN D DEFICIENCY DISEASE: ICD-10-CM

## 2021-05-27 DIAGNOSIS — I51.9 MYXEDEMA HEART DISEASE: ICD-10-CM

## 2021-05-27 DIAGNOSIS — N39.0 URINARY TRACT INFECTION WITHOUT HEMATURIA, SITE UNSPECIFIED: ICD-10-CM

## 2021-05-27 DIAGNOSIS — E03.9 MYXEDEMA HEART DISEASE: ICD-10-CM

## 2021-05-27 DIAGNOSIS — D64.9 ANEMIA, UNSPECIFIED TYPE: Primary | ICD-10-CM

## 2021-05-27 DIAGNOSIS — I10 ESSENTIAL HYPERTENSION, MALIGNANT: ICD-10-CM

## 2021-05-27 DIAGNOSIS — D64.9 ANEMIA, UNSPECIFIED TYPE: ICD-10-CM

## 2021-05-27 LAB
25(OH)D3 SERPL-MCNC: 39.4 NG/ML (ref 30–100)
ALBUMIN SERPL BCP-MCNC: 3.9 G/DL (ref 3.5–5)
ALP SERPL-CCNC: 77 U/L (ref 46–116)
ALT SERPL W P-5'-P-CCNC: 23 U/L (ref 12–78)
ANION GAP SERPL CALCULATED.3IONS-SCNC: 6 MMOL/L (ref 4–13)
AST SERPL W P-5'-P-CCNC: 19 U/L (ref 5–45)
BASOPHILS # BLD AUTO: 0.03 THOUSANDS/ΜL (ref 0–0.1)
BASOPHILS NFR BLD AUTO: 1 % (ref 0–1)
BILIRUB SERPL-MCNC: 0.73 MG/DL (ref 0.2–1)
BUN SERPL-MCNC: 16 MG/DL (ref 5–25)
CALCIUM SERPL-MCNC: 9.8 MG/DL (ref 8.3–10.1)
CHLORIDE SERPL-SCNC: 109 MMOL/L (ref 100–108)
CHOLEST SERPL-MCNC: 173 MG/DL (ref 50–200)
CO2 SERPL-SCNC: 26 MMOL/L (ref 21–32)
CREAT SERPL-MCNC: 0.92 MG/DL (ref 0.6–1.3)
EOSINOPHIL # BLD AUTO: 0.12 THOUSAND/ΜL (ref 0–0.61)
EOSINOPHIL NFR BLD AUTO: 3 % (ref 0–6)
ERYTHROCYTE [DISTWIDTH] IN BLOOD BY AUTOMATED COUNT: 13.2 % (ref 11.6–15.1)
EST. AVERAGE GLUCOSE BLD GHB EST-MCNC: 105 MG/DL
GFR SERPL CREATININE-BSD FRML MDRD: 57 ML/MIN/1.73SQ M
GLUCOSE P FAST SERPL-MCNC: 91 MG/DL (ref 65–99)
HBA1C MFR BLD: 5.3 %
HCT VFR BLD AUTO: 40.9 % (ref 34.8–46.1)
HDLC SERPL-MCNC: 41 MG/DL
HGB BLD-MCNC: 13 G/DL (ref 11.5–15.4)
IMM GRANULOCYTES # BLD AUTO: 0 THOUSAND/UL (ref 0–0.2)
IMM GRANULOCYTES NFR BLD AUTO: 0 % (ref 0–2)
LDLC SERPL CALC-MCNC: 99 MG/DL (ref 0–100)
LYMPHOCYTES # BLD AUTO: 1.57 THOUSANDS/ΜL (ref 0.6–4.47)
LYMPHOCYTES NFR BLD AUTO: 38 % (ref 14–44)
MCH RBC QN AUTO: 31.1 PG (ref 26.8–34.3)
MCHC RBC AUTO-ENTMCNC: 31.8 G/DL (ref 31.4–37.4)
MCV RBC AUTO: 98 FL (ref 82–98)
MONOCYTES # BLD AUTO: 0.52 THOUSAND/ΜL (ref 0.17–1.22)
MONOCYTES NFR BLD AUTO: 13 % (ref 4–12)
NEUTROPHILS # BLD AUTO: 1.85 THOUSANDS/ΜL (ref 1.85–7.62)
NEUTS SEG NFR BLD AUTO: 45 % (ref 43–75)
NONHDLC SERPL-MCNC: 132 MG/DL
NRBC BLD AUTO-RTO: 0 /100 WBCS
PLATELET # BLD AUTO: 316 THOUSANDS/UL (ref 149–390)
PMV BLD AUTO: 9.6 FL (ref 8.9–12.7)
POTASSIUM SERPL-SCNC: 3.9 MMOL/L (ref 3.5–5.3)
PROT SERPL-MCNC: 7.5 G/DL (ref 6.4–8.2)
RBC # BLD AUTO: 4.18 MILLION/UL (ref 3.81–5.12)
SODIUM SERPL-SCNC: 141 MMOL/L (ref 136–145)
T4 FREE SERPL-MCNC: 1.01 NG/DL (ref 0.76–1.46)
TRIGL SERPL-MCNC: 164 MG/DL
TSH SERPL DL<=0.05 MIU/L-ACNC: 2.25 UIU/ML (ref 0.36–3.74)
WBC # BLD AUTO: 4.09 THOUSAND/UL (ref 4.31–10.16)

## 2021-05-27 PROCEDURE — 84439 ASSAY OF FREE THYROXINE: CPT

## 2021-05-27 PROCEDURE — 82306 VITAMIN D 25 HYDROXY: CPT

## 2021-05-27 PROCEDURE — 84443 ASSAY THYROID STIM HORMONE: CPT

## 2021-05-27 PROCEDURE — 80053 COMPREHEN METABOLIC PANEL: CPT

## 2021-05-27 PROCEDURE — 36415 COLL VENOUS BLD VENIPUNCTURE: CPT

## 2021-05-27 PROCEDURE — 80061 LIPID PANEL: CPT

## 2021-05-27 PROCEDURE — 83036 HEMOGLOBIN GLYCOSYLATED A1C: CPT

## 2021-05-27 PROCEDURE — 85025 COMPLETE CBC W/AUTO DIFF WBC: CPT

## 2021-12-15 ENCOUNTER — PROBLEM (OUTPATIENT)
Dept: URBAN - METROPOLITAN AREA CLINIC 6 | Facility: CLINIC | Age: 86
End: 2021-12-15

## 2021-12-15 DIAGNOSIS — H44.512: ICD-10-CM

## 2021-12-15 DIAGNOSIS — H40.1113: ICD-10-CM

## 2021-12-15 PROCEDURE — G8427 DOCREV CUR MEDS BY ELIG CLIN: HCPCS

## 2021-12-15 PROCEDURE — 92012 INTRM OPH EXAM EST PATIENT: CPT

## 2021-12-15 PROCEDURE — 1036F TOBACCO NON-USER: CPT

## 2021-12-15 PROCEDURE — 65800 DRAINAGE OF EYE: CPT

## 2021-12-15 ASSESSMENT — TONOMETRY
OD_IOP_MMHG: 73
OD_IOP_MMHG: 11
OS_IOP_MMHG: 27

## 2021-12-21 ENCOUNTER — FOLLOW UP (OUTPATIENT)
Dept: URBAN - METROPOLITAN AREA CLINIC 6 | Facility: CLINIC | Age: 86
End: 2021-12-21

## 2021-12-21 DIAGNOSIS — H40.1113: ICD-10-CM

## 2021-12-21 DIAGNOSIS — H44.512: ICD-10-CM

## 2021-12-21 PROCEDURE — 92012 INTRM OPH EXAM EST PATIENT: CPT

## 2021-12-21 PROCEDURE — 1036F TOBACCO NON-USER: CPT

## 2021-12-21 PROCEDURE — G8427 DOCREV CUR MEDS BY ELIG CLIN: HCPCS

## 2021-12-21 ASSESSMENT — TONOMETRY
OS_IOP_MMHG: 5
OD_IOP_MMHG: 43

## 2021-12-21 ASSESSMENT — VISUAL ACUITY: OD_SC: 20/200

## 2022-01-04 ENCOUNTER — SURGERY/PROCEDURE (OUTPATIENT)
Dept: URBAN - METROPOLITAN AREA SURGICAL CENTER 6 | Facility: SURGICAL CENTER | Age: 87
End: 2022-01-04

## 2022-01-04 DIAGNOSIS — H40.1113: ICD-10-CM

## 2022-01-04 PROCEDURE — 66179 AQUEOUS SHUNT EYE W/O GRAFT: CPT

## 2022-01-05 ENCOUNTER — FOLLOW UP (OUTPATIENT)
Dept: URBAN - METROPOLITAN AREA CLINIC 6 | Facility: CLINIC | Age: 87
End: 2022-01-05

## 2022-01-05 DIAGNOSIS — H40.1113: ICD-10-CM

## 2022-01-05 PROCEDURE — 92012 INTRM OPH EXAM EST PATIENT: CPT

## 2022-01-05 ASSESSMENT — TONOMETRY: OD_IOP_MMHG: 11

## 2022-01-10 ENCOUNTER — 1 WEEK POST-OP (OUTPATIENT)
Dept: URBAN - METROPOLITAN AREA CLINIC 6 | Facility: CLINIC | Age: 87
End: 2022-01-10

## 2022-01-10 DIAGNOSIS — Z98.890: ICD-10-CM

## 2022-01-10 PROCEDURE — 99024 POSTOP FOLLOW-UP VISIT: CPT

## 2022-01-10 ASSESSMENT — VISUAL ACUITY: OD_SC: 20/400

## 2022-01-10 ASSESSMENT — TONOMETRY: OD_IOP_MMHG: 05

## 2022-01-19 ENCOUNTER — 1 WEEK POST-OP (OUTPATIENT)
Dept: URBAN - METROPOLITAN AREA CLINIC 6 | Facility: CLINIC | Age: 87
End: 2022-01-19

## 2022-01-19 DIAGNOSIS — Z98.890: ICD-10-CM

## 2022-01-19 PROCEDURE — 99024 POSTOP FOLLOW-UP VISIT: CPT

## 2022-01-19 ASSESSMENT — TONOMETRY
OD_IOP_MMHG: 23
OD_IOP_MMHG: 09

## 2022-01-19 ASSESSMENT — VISUAL ACUITY: OD_SC: 20/400

## 2022-02-02 ENCOUNTER — POST-OP CHECK (OUTPATIENT)
Dept: URBAN - METROPOLITAN AREA CLINIC 6 | Facility: CLINIC | Age: 87
End: 2022-02-02

## 2022-02-02 DIAGNOSIS — Z98.890: ICD-10-CM

## 2022-02-02 DIAGNOSIS — H27.111: ICD-10-CM

## 2022-02-02 DIAGNOSIS — H40.1113: ICD-10-CM

## 2022-02-02 PROCEDURE — 99024 POSTOP FOLLOW-UP VISIT: CPT

## 2022-02-02 ASSESSMENT — VISUAL ACUITY
OD_SC: 20/400
OU_SC: J16
OD_PH: 20/70+1

## 2022-02-02 ASSESSMENT — TONOMETRY: OD_IOP_MMHG: 17

## 2022-02-24 ENCOUNTER — 1 MONTH POST-OP (OUTPATIENT)
Dept: URBAN - METROPOLITAN AREA CLINIC 6 | Facility: CLINIC | Age: 87
End: 2022-02-24

## 2022-02-24 DIAGNOSIS — H40.1113: ICD-10-CM

## 2022-02-24 DIAGNOSIS — H27.111: ICD-10-CM

## 2022-02-24 DIAGNOSIS — Z98.890: ICD-10-CM

## 2022-02-24 PROCEDURE — 99024 POSTOP FOLLOW-UP VISIT: CPT

## 2022-02-24 ASSESSMENT — TONOMETRY: OD_IOP_MMHG: 26

## 2022-02-24 ASSESSMENT — VISUAL ACUITY: OD_CC: 20/60-1

## 2022-03-02 ENCOUNTER — OFFICE VISIT (OUTPATIENT)
Dept: LAB | Facility: HOSPITAL | Age: 87
End: 2022-03-02
Attending: INTERNAL MEDICINE
Payer: MEDICARE

## 2022-03-02 DIAGNOSIS — Z01.818 PRE-OP EXAM: ICD-10-CM

## 2022-03-02 PROCEDURE — 93005 ELECTROCARDIOGRAM TRACING: CPT

## 2022-03-03 LAB
ATRIAL RATE: 86 BPM
P AXIS: 76 DEGREES
PR INTERVAL: 168 MS
QRS AXIS: 57 DEGREES
QRSD INTERVAL: 68 MS
QT INTERVAL: 376 MS
QTC INTERVAL: 449 MS
T WAVE AXIS: 62 DEGREES
VENTRICULAR RATE: 86 BPM

## 2022-03-03 PROCEDURE — 93010 ELECTROCARDIOGRAM REPORT: CPT | Performed by: INTERNAL MEDICINE

## 2022-03-14 ENCOUNTER — POST-OP CHECK (OUTPATIENT)
Dept: URBAN - METROPOLITAN AREA CLINIC 6 | Facility: CLINIC | Age: 87
End: 2022-03-14

## 2022-03-14 DIAGNOSIS — H27.111: ICD-10-CM

## 2022-03-14 DIAGNOSIS — H40.1113: ICD-10-CM

## 2022-03-14 DIAGNOSIS — Z98.890: ICD-10-CM

## 2022-03-14 PROCEDURE — 99024 POSTOP FOLLOW-UP VISIT: CPT

## 2022-03-14 ASSESSMENT — TONOMETRY: OD_IOP_MMHG: 14

## 2022-03-14 ASSESSMENT — VISUAL ACUITY
OD_CC: 20/50-2
OU_CC: J10
OD_PH: 20/40

## 2022-04-11 ENCOUNTER — FOLLOW UP (OUTPATIENT)
Dept: URBAN - METROPOLITAN AREA CLINIC 6 | Facility: CLINIC | Age: 87
End: 2022-04-11

## 2022-04-11 DIAGNOSIS — Z98.890: ICD-10-CM

## 2022-04-11 DIAGNOSIS — H40.1113: ICD-10-CM

## 2022-04-11 DIAGNOSIS — H27.111: ICD-10-CM

## 2022-04-11 PROCEDURE — 99214 OFFICE O/P EST MOD 30 MIN: CPT

## 2022-04-11 ASSESSMENT — TONOMETRY
OD_IOP_MMHG: 26
OS_IOP_MMHG: 28

## 2022-04-11 ASSESSMENT — VISUAL ACUITY
OD_PH: 20/40+1
OU_CC: J1-1
OD_CC: 20/50+1

## 2022-04-26 ENCOUNTER — APPOINTMENT (OUTPATIENT)
Dept: LAB | Facility: CLINIC | Age: 87
End: 2022-04-26
Payer: MEDICARE

## 2022-04-26 DIAGNOSIS — E55.0: ICD-10-CM

## 2022-04-26 DIAGNOSIS — D64.9 ANEMIA, UNSPECIFIED TYPE: ICD-10-CM

## 2022-04-26 DIAGNOSIS — N39.0 URINARY TRACT INFECTION WITHOUT HEMATURIA, SITE UNSPECIFIED: ICD-10-CM

## 2022-04-26 DIAGNOSIS — I51.9 MYXEDEMA HEART DISEASE: ICD-10-CM

## 2022-04-26 DIAGNOSIS — E03.9 MYXEDEMA HEART DISEASE: ICD-10-CM

## 2022-04-26 DIAGNOSIS — I10 ESSENTIAL HYPERTENSION, MALIGNANT: ICD-10-CM

## 2022-04-26 DIAGNOSIS — E11.9 TYPE 2 DIABETES MELLITUS WITHOUT COMPLICATION, WITHOUT LONG-TERM CURRENT USE OF INSULIN (HCC): ICD-10-CM

## 2022-04-26 LAB
25(OH)D3 SERPL-MCNC: 62.3 NG/ML (ref 30–100)
ALBUMIN SERPL BCP-MCNC: 3.9 G/DL (ref 3.5–5)
ALP SERPL-CCNC: 73 U/L (ref 46–116)
ALT SERPL W P-5'-P-CCNC: 24 U/L (ref 12–78)
ANION GAP SERPL CALCULATED.3IONS-SCNC: 4 MMOL/L (ref 4–13)
AST SERPL W P-5'-P-CCNC: 20 U/L (ref 5–45)
BASOPHILS # BLD AUTO: 0.04 THOUSANDS/ΜL (ref 0–0.1)
BASOPHILS NFR BLD AUTO: 1 % (ref 0–1)
BILIRUB SERPL-MCNC: 0.52 MG/DL (ref 0.2–1)
BUN SERPL-MCNC: 12 MG/DL (ref 5–25)
CALCIUM SERPL-MCNC: 9.5 MG/DL (ref 8.3–10.1)
CHLORIDE SERPL-SCNC: 105 MMOL/L (ref 100–108)
CHOLEST SERPL-MCNC: 156 MG/DL
CO2 SERPL-SCNC: 28 MMOL/L (ref 21–32)
CREAT SERPL-MCNC: 0.86 MG/DL (ref 0.6–1.3)
EOSINOPHIL # BLD AUTO: 0.06 THOUSAND/ΜL (ref 0–0.61)
EOSINOPHIL NFR BLD AUTO: 1 % (ref 0–6)
ERYTHROCYTE [DISTWIDTH] IN BLOOD BY AUTOMATED COUNT: 13.6 % (ref 11.6–15.1)
EST. AVERAGE GLUCOSE BLD GHB EST-MCNC: 105 MG/DL
GFR SERPL CREATININE-BSD FRML MDRD: 61 ML/MIN/1.73SQ M
GLUCOSE P FAST SERPL-MCNC: 92 MG/DL (ref 65–99)
HBA1C MFR BLD: 5.3 %
HCT VFR BLD AUTO: 40.9 % (ref 34.8–46.1)
HDLC SERPL-MCNC: 41 MG/DL
HGB BLD-MCNC: 13.5 G/DL (ref 11.5–15.4)
IMM GRANULOCYTES # BLD AUTO: 0.01 THOUSAND/UL (ref 0–0.2)
IMM GRANULOCYTES NFR BLD AUTO: 0 % (ref 0–2)
LDLC SERPL CALC-MCNC: 82 MG/DL (ref 0–100)
LYMPHOCYTES # BLD AUTO: 1.06 THOUSANDS/ΜL (ref 0.6–4.47)
LYMPHOCYTES NFR BLD AUTO: 22 % (ref 14–44)
MCH RBC QN AUTO: 32.1 PG (ref 26.8–34.3)
MCHC RBC AUTO-ENTMCNC: 33 G/DL (ref 31.4–37.4)
MCV RBC AUTO: 97 FL (ref 82–98)
MONOCYTES # BLD AUTO: 0.65 THOUSAND/ΜL (ref 0.17–1.22)
MONOCYTES NFR BLD AUTO: 13 % (ref 4–12)
NEUTROPHILS # BLD AUTO: 3.11 THOUSANDS/ΜL (ref 1.85–7.62)
NEUTS SEG NFR BLD AUTO: 63 % (ref 43–75)
NONHDLC SERPL-MCNC: 115 MG/DL
NRBC BLD AUTO-RTO: 0 /100 WBCS
PLATELET # BLD AUTO: 300 THOUSANDS/UL (ref 149–390)
PMV BLD AUTO: 9.3 FL (ref 8.9–12.7)
POTASSIUM SERPL-SCNC: 3.8 MMOL/L (ref 3.5–5.3)
PROT SERPL-MCNC: 7.3 G/DL (ref 6.4–8.2)
RBC # BLD AUTO: 4.21 MILLION/UL (ref 3.81–5.12)
SODIUM SERPL-SCNC: 137 MMOL/L (ref 136–145)
T4 FREE SERPL-MCNC: 1.05 NG/DL (ref 0.76–1.46)
TRIGL SERPL-MCNC: 165 MG/DL
TSH SERPL DL<=0.05 MIU/L-ACNC: 2.41 UIU/ML (ref 0.45–4.5)
WBC # BLD AUTO: 4.93 THOUSAND/UL (ref 4.31–10.16)

## 2022-04-26 PROCEDURE — 80053 COMPREHEN METABOLIC PANEL: CPT

## 2022-04-26 PROCEDURE — 84443 ASSAY THYROID STIM HORMONE: CPT

## 2022-04-26 PROCEDURE — 80061 LIPID PANEL: CPT

## 2022-04-26 PROCEDURE — 83036 HEMOGLOBIN GLYCOSYLATED A1C: CPT

## 2022-04-26 PROCEDURE — 36415 COLL VENOUS BLD VENIPUNCTURE: CPT

## 2022-04-26 PROCEDURE — 85025 COMPLETE CBC W/AUTO DIFF WBC: CPT

## 2022-04-26 PROCEDURE — 84439 ASSAY OF FREE THYROXINE: CPT

## 2022-04-26 PROCEDURE — 82306 VITAMIN D 25 HYDROXY: CPT

## 2022-05-12 ENCOUNTER — FOLLOW UP (OUTPATIENT)
Dept: URBAN - METROPOLITAN AREA CLINIC 6 | Facility: CLINIC | Age: 87
End: 2022-05-12

## 2022-05-12 DIAGNOSIS — H27.111: ICD-10-CM

## 2022-05-12 DIAGNOSIS — H40.1113: ICD-10-CM

## 2022-05-12 PROCEDURE — 92012 INTRM OPH EXAM EST PATIENT: CPT

## 2022-05-12 ASSESSMENT — TONOMETRY
OS_IOP_MMHG: 23
OD_IOP_MMHG: 14

## 2022-05-12 ASSESSMENT — VISUAL ACUITY: OD_CC: 20/40-2

## 2022-07-02 NOTE — SOCIAL WORK
Per PCP, pt stable for DC to [facility] for STR  Pt is aware of DC, discussed IMM with her  Copy provided  Pt in need of transportation, [address] set up for her to go through [employer]   to be between 1-2pm today  Unit RN aware and [facility] aware and ready for report  CM called and updated Aba Aldaan and he is aware of DC for today  show

## 2022-08-22 ENCOUNTER — FOLLOW UP (OUTPATIENT)
Dept: URBAN - METROPOLITAN AREA CLINIC 6 | Facility: CLINIC | Age: 87
End: 2022-08-22

## 2022-08-22 DIAGNOSIS — H40.1113: ICD-10-CM

## 2022-08-22 DIAGNOSIS — H27.111: ICD-10-CM

## 2022-08-22 PROCEDURE — 92012 INTRM OPH EXAM EST PATIENT: CPT

## 2022-08-22 ASSESSMENT — VISUAL ACUITY: OD_CC: 20/40-1

## 2022-08-22 ASSESSMENT — TONOMETRY
OD_IOP_MMHG: 18
OS_IOP_MMHG: 16

## 2022-09-15 DIAGNOSIS — T78.40XD ALLERGY, SUBSEQUENT ENCOUNTER: Primary | ICD-10-CM

## 2022-09-15 RX ORDER — FLUTICASONE PROPIONATE 50 MCG
1 SPRAY, SUSPENSION (ML) NASAL 2 TIMES DAILY
Qty: 3 G | Refills: 1 | Status: SHIPPED | OUTPATIENT
Start: 2022-09-15

## 2022-10-27 ENCOUNTER — RA CDI HCC (OUTPATIENT)
Dept: OTHER | Facility: HOSPITAL | Age: 87
End: 2022-10-27

## 2022-10-27 NOTE — PROGRESS NOTES
[address]  coding opportunities       Chart reviewed, no opportunity found: CHART REVIEWED, NO OPPORTUNITY FOUND        Patients Insurance     Medicare Insurance: Medicare

## 2022-11-03 ENCOUNTER — OFFICE VISIT (OUTPATIENT)
Dept: FAMILY MEDICINE CLINIC | Facility: CLINIC | Age: 87
End: 2022-11-03

## 2022-11-03 VITALS
HEART RATE: 83 BPM | BODY MASS INDEX: 22.88 KG/M2 | SYSTOLIC BLOOD PRESSURE: 140 MMHG | DIASTOLIC BLOOD PRESSURE: 90 MMHG | HEIGHT: 64 IN | WEIGHT: 134 LBS | OXYGEN SATURATION: 96 %

## 2022-11-03 DIAGNOSIS — M81.0 AGE-RELATED OSTEOPOROSIS WITHOUT CURRENT PATHOLOGICAL FRACTURE: ICD-10-CM

## 2022-11-03 DIAGNOSIS — E78.5 DYSLIPIDEMIA: ICD-10-CM

## 2022-11-03 DIAGNOSIS — Z23 ENCOUNTER FOR IMMUNIZATION: Primary | ICD-10-CM

## 2022-11-03 DIAGNOSIS — I10 PRIMARY HYPERTENSION: ICD-10-CM

## 2022-11-03 RX ORDER — MULTIVIT-MIN/IRON/FOLIC ACID/K 18-600-40
CAPSULE ORAL
COMMUNITY

## 2022-11-03 RX ORDER — LISINOPRIL 20 MG/1
20 TABLET ORAL DAILY
Qty: 90 TABLET | Refills: 1 | Status: SHIPPED | OUTPATIENT
Start: 2022-11-03

## 2022-11-03 RX ORDER — PHENOL 1.4 %
600 AEROSOL, SPRAY (ML) MUCOUS MEMBRANE 2 TIMES DAILY WITH MEALS
COMMUNITY

## 2022-11-03 RX ORDER — ATORVASTATIN CALCIUM 20 MG/1
20 TABLET, FILM COATED ORAL
Qty: 90 TABLET | Refills: 1 | Status: SHIPPED | OUTPATIENT
Start: 2022-11-03

## 2022-11-03 NOTE — ASSESSMENT & PLAN NOTE
Patient with history of hypertension currently taking lisinopril 20 mg daily today's blood pressure is slightly high at 1 40/90  Will continue to monitor if the pressure remains high next visit will need to adjust the dose of medication  Discussed with the patient about salt restriction

## 2022-11-03 NOTE — PROGRESS NOTES
Office Visit Note  22     Olivia Hammans 82 y o  female MRN: [id number]  : 1935    Assessment:     1  Encounter for immunization  -     influenza vaccine, high-dose, PF 0 5 mL    2  Age-related osteoporosis without current pathological fracture  Assessment & Plan:  Patient with a history of osteoporosis not on any medications at this time ever since she developed some side effects with the medications taken orally  Recommend patient to take calcium tablets along with vitamin-D       3  Dyslipidemia  Assessment & Plan:  Patient with a history of hypercholesterolemia currently taking atorvastatin 20 mg at bedtime  Last cholesterol level is 156 triglycerides 165 HDL is 41 and LDL is 82  Will continue with the same dose follow up with repeat lab at a later date  Orders:  -     atorvastatin (LIPITOR) 20 mg tablet; Take 1 tablet (20 mg total) by mouth daily at bedtime    4  Primary hypertension  Assessment & Plan:  Patient with history of hypertension currently taking lisinopril 20 mg daily today's blood pressure is slightly high at 1 40/90  Will continue to monitor if the pressure remains high next visit will need to adjust the dose of medication  Discussed with the patient about salt restriction  Orders:  -     lisinopril (ZESTRIL) 20 mg tablet; Take 1 tablet (20 mg total) by mouth daily        Discussion Summary and Plan: Today's care plan and medications were reviewed with patient in detail and all their questions answered to their satisfaction  Chief Complaint   Patient presents with   • Follow-up     Flu vaccine      Subjective:  Patient has come in for evaluation regarding symptoms of hypertension and hyperlipidemia   Patient also has a history of osteoporosis was tried on oral medications and developed side effects and does not want to go on medications any more    Patient denies chest pains palpitation shortness of breath she is status post bilateral hip replacement doing fine according to her  She is blind in the left eye  The following portions of the patient's history were reviewed and updated as appropriate: allergies, current medications, past family history, past medical history, past social history, past surgical history and problem list     Review of Systems   Constitutional: Negative for chills and fever  HENT: Negative for ear pain and sore throat  Eyes: Negative for pain and visual disturbance  Blind left eye   Respiratory: Negative for cough and shortness of breath  Cardiovascular: Negative for chest pain and palpitations  Gastrointestinal: Negative for abdominal pain and vomiting  Genitourinary: Negative for dysuria and hematuria  Musculoskeletal: Positive for arthralgias  Negative for back pain  Skin: Negative for color change and rash  Neurological: Negative for seizures and syncope  All other systems reviewed and are negative  Historical Information   Patient Active Problem List   Diagnosis   • GI bleed   • UTI (urinary tract infection)   • Gastroenteritis   • Hypokalemia   • Primary hypertension   • Dyslipidemia   • Age-related osteoporosis without current pathological fracture     Past Medical History:   Diagnosis Date   • Arthritis    • GERD (gastroesophageal reflux disease)    • Hyperlipidemia    • Hypertension    • Osteoporosis    • Urinary tract infection      Past Surgical History:   Procedure Laterality Date   • CATARACT EXTRACTION     • COLONOSCOPY N/A 3/2/2017    Procedure: COLONOSCOPY;  Surgeon: Reynaldo Humphreys MD;  Location: [place] [address] [facility]; Service:    • JOINT REPLACEMENT Bilateral 2011, 2012    hips   • REFRACTIVE SURGERY Right      Social History     Substance and Sexual Activity   Alcohol Use No    Comment: on holidays     Social History     Substance and Sexual Activity   Drug Use No     Social History     Tobacco Use   Smoking Status Never Smoker   Smokeless Tobacco Never Used     History reviewed   No pertinent family history  Health Maintenance Due   Topic   • Medicare Annual Wellness Visit (AWV)    • Pneumococcal Vaccine: 65+ Years (1 - PCV)   • Depression Screening    • Fall Risk    • Urinary Incontinence Screening    • COVID-19 Vaccine (3 - Booster for Moderna series)      Meds/Allergies       Current Outpatient Medications:   •  aspirin 81 MG tablet, Take 81 mg by mouth daily, Disp: , Rfl:   •  atorvastatin (LIPITOR) 20 mg tablet, Take 1 tablet (20 mg total) by mouth daily at bedtime, Disp: 90 tablet, Rfl: 1  •  bimatoprost (LUMIGAN) 0 01 % ophthalmic drops, 1 drop daily at bedtime, Disp: , Rfl:   •  calcium carbonate (OS-MACRINA) 600 MG tablet, Take 600 mg by mouth 2 (two) times a day with meals, Disp: , Rfl:   •  Cholecalciferol (Vitamin D) 50 MCG (2000 UT) CAPS, Take by mouth, Disp: , Rfl:   •  fluticasone (FLONASE) 50 mcg/act nasal spray, 1 spray into each nostril 2 (two) times a day, Disp: 3 g, Rfl: 1  •  lisinopril (ZESTRIL) 20 mg tablet, Take 1 tablet (20 mg total) by mouth daily, Disp: 90 tablet, Rfl: 1  •  multivitamin (THERAGRAN) TABS, Take 1 tablet by mouth daily, Disp: , Rfl:       Objective:    Vitals:   /90 (BP Location: Right arm, Patient Position: Sitting, Cuff Size: Standard)   Pulse 83   Ht 5' 4" (1 626 m)   Wt 60 8 kg (134 lb)   LMP  (LMP Unknown)   SpO2 96%   BMI 23 00 kg/m²   Body mass index is 23 kg/m²  Vitals:    11/03/22 1307   Weight: 60 8 kg (134 lb)       Physical Exam  Vitals and nursing note reviewed  Constitutional:       Appearance: Normal appearance  Eyes:      Comments: Left eye patient is blind   Cardiovascular:      Rate and Rhythm: Normal rate and regular rhythm  Heart sounds: Normal heart sounds  Pulmonary:      Effort: Pulmonary effort is normal       Breath sounds: Normal breath sounds  Abdominal:      General: Abdomen is flat  Palpations: Abdomen is soft  Musculoskeletal:      Cervical back: Normal range of motion and neck supple        Right lower leg: No edema  Left lower leg: No edema  Skin:     General: Skin is warm and dry  Neurological:      Mental Status: She is alert and oriented to person, place, and time  Lab Review   No visits with results within 2 Month(s) from this visit     Latest known visit with results is:   Appointment on 04/26/2022   Component Date Value Ref Range Status   • WBC 04/26/2022 4 93  4 31 - 10 16 Thousand/uL Final   • RBC 04/26/2022 4 21  3 81 - 5 12 Million/uL Final   • Hemoglobin 04/26/2022 13 5  11 5 - 15 4 g/dL Final   • Hematocrit 04/26/2022 40 9  34 8 - 46 1 % Final   • MCV 04/26/2022 97  82 - 98 fL Final   • MCH 04/26/2022 32 1  26 8 - 34 3 pg Final   • MCHC 04/26/2022 33 0  31 4 - 37 4 g/dL Final   • RDW 04/26/2022 13 6  11 6 - 15 1 % Final   • MPV 04/26/2022 9 3  8 9 - 12 7 fL Final   • Platelets 38/11/5195 300  149 - 390 Thousands/uL Final   • nRBC 04/26/2022 0  /100 WBCs Final   • Neutrophils Relative 04/26/2022 63  43 - 75 % Final   • Immat GRANS % 04/26/2022 0  0 - 2 % Final   • Lymphocytes Relative 04/26/2022 22  14 - 44 % Final   • Monocytes Relative 04/26/2022 13 (A) 4 - 12 % Final   • Eosinophils Relative 04/26/2022 1  0 - 6 % Final   • Basophils Relative 04/26/2022 1  0 - 1 % Final   • Neutrophils Absolute 04/26/2022 3 11  1 85 - 7 62 Thousands/µL Final   • Immature Grans Absolute 04/26/2022 0 01  0 00 - 0 20 Thousand/uL Final   • Lymphocytes Absolute 04/26/2022 1 06  0 60 - 4 47 Thousands/µL Final   • Monocytes Absolute 04/26/2022 0 65  0 17 - 1 22 Thousand/µL Final   • Eosinophils Absolute 04/26/2022 0 06  0 00 - 0 61 Thousand/µL Final   • Basophils Absolute 04/26/2022 0 04  0 00 - 0 10 Thousands/µL Final   • Sodium 04/26/2022 137  136 - 145 mmol/L Final   • Potassium 04/26/2022 3 8  3 5 - 5 3 mmol/L Final   • Chloride 04/26/2022 105  100 - 108 mmol/L Final   • CO2 04/26/2022 28  21 - 32 mmol/L Final   • ANION GAP 04/26/2022 4  4 - 13 mmol/L Final   • BUN 04/26/2022 12  5 - 25 mg/dL Final   • Creatinine 04/26/2022 0 86  0 60 - 1 30 mg/dL Final    Standardized to IDMS reference method   • Glucose, Fasting 04/26/2022 92  65 - 99 mg/dL Final    Specimen collection should occur prior to Sulfasalazine administration due to the potential for falsely depressed results  Specimen collection should occur prior to Sulfapyridine administration due to the potential for falsely elevated results  • Calcium 04/26/2022 9 5  8 3 - 10 1 mg/dL Final   • AST 04/26/2022 20  5 - 45 U/L Final    Specimen collection should occur prior to Sulfasalazine administration due to the potential for falsely depressed results  • ALT 04/26/2022 24  12 - 78 U/L Final    Specimen collection should occur prior to Sulfasalazine and/or Sulfapyridine administration due to the potential for falsely depressed results  • Alkaline Phosphatase 04/26/2022 73  46 - 116 U/L Final   • Total Protein 04/26/2022 7 3  6 4 - 8 2 g/dL Final   • Albumin 04/26/2022 3 9  3 5 - 5 0 g/dL Final   • Total Bilirubin 04/26/2022 0 52  0 20 - 1 00 mg/dL Final    Use of this assay is not recommended for patients undergoing treatment with eltrombopag due to the potential for falsely elevated results  • eGFR 04/26/2022 61  ml/min/1 73sq m Final   • Hemoglobin A1C 04/26/2022 5 3  Normal 3 8-5 6%; PreDiabetic 5 7-6 4%;  Diabetic >=6 5%; Glycemic control for adults with diabetes <7 0% % Final   • EAG 04/26/2022 105  mg/dl Final   • Cholesterol 04/26/2022 156  See Comment mg/dL Final    Cholesterol:         Pediatric <18 Years        Desirable          <170 mg/dL      Borderline High    170-199 mg/dL      High               >=200 mg/dL        Adult >=18 Years            Desirable         <200 mg/dL      Borderline High   200-239 mg/dL      High              >239 mg/dL     • Triglycerides 04/26/2022 165 (A) See Comment mg/dL Final    Triglyceride:     0-9Y            <75mg/dL     10Y-17Y         <90 mg/dL       >=18Y     Normal          <150 mg/dL     Borderline High 150-199 mg/dL     High            200-499 mg/dL        Very High       >499 mg/dL    Specimen collection should occur prior to N-Acetylcysteine or Metamizole administration due to the potential for falsely depressed results  • HDL, Direct 04/26/2022 41 (A) >=50 mg/dL Final    Specimen collection should occur prior to Metamizole administration due to the potential for falsley depressed results  • LDL Calculated 04/26/2022 82  0 - 100 mg/dL Final    LDL Cholesterol:     Optimal           <100 mg/dl     Near Optimal      100-129 mg/dl     Above Optimal       Borderline High 130-159 mg/dl       High            160-189 mg/dl       Very High       >189 mg/dl         This screening LDL is a calculated result  It does not have the accuracy of the Direct Measured LDL in the monitoring of patients with hyperlipidemia and/or statin therapy  Direct Measure LDL (IQC864) must be ordered separately in these patients  • Non-HDL-Chol (CHOL-HDL) 04/26/2022 115  mg/dl Final   • Free T4 04/26/2022 1 05  0 76 - 1 46 ng/dL Final    Specimen collection should occur prior to Sulfasalazine administration due to the potential for falsely elevated results  • TSH 3RD GENERATON 04/26/2022 2 410  0 450 - 4 500 uIU/mL Final    The recommended reference ranges for TSH during pregnancy are as follows:   First trimester 0 1 to 2 5 uIU/mL   Second trimester  0 2 to 3 0 uIU/mL   Third trimester 0 3 to 3 0 uIU/m    Note: Normal ranges may not apply to patients who are transgender, non-binary, or whose legal sex, sex at birth, and gender identity differ  Adult TSH (3rd generation) reference range follows the recommended guidelines of the American Thyroid Association, January, 2020  • Vit D, 25-Hydroxy 04/26/2022 62 3  30 0 - 100 0 ng/mL Final         Geovani Hollingsworth        "This note has been constructed using a voice recognition system  Therefore there may be syntax, spelling, and/or grammatical errors   Please call if you have any questions  "

## 2022-11-03 NOTE — ASSESSMENT & PLAN NOTE
Patient with a history of osteoporosis not on any medications at this time ever since she developed some side effects with the medications taken orally    Recommend patient to take calcium tablets along with vitamin-D

## 2022-11-03 NOTE — ASSESSMENT & PLAN NOTE
Patient with a history of hypercholesterolemia currently taking atorvastatin 20 mg at bedtime  Last cholesterol level is 156 triglycerides 165 HDL is 41 and LDL is 82  Will continue with the same dose follow up with repeat lab at a later date

## 2022-12-08 ENCOUNTER — FOLLOW UP (OUTPATIENT)
Dept: URBAN - METROPOLITAN AREA CLINIC 6 | Facility: CLINIC | Age: 87
End: 2022-12-08

## 2022-12-08 DIAGNOSIS — H44.512: ICD-10-CM

## 2022-12-08 DIAGNOSIS — H40.1112: ICD-10-CM

## 2022-12-08 PROCEDURE — 92133 CPTRZD OPH DX IMG PST SGM ON: CPT

## 2022-12-08 PROCEDURE — 92014 COMPRE OPH EXAM EST PT 1/>: CPT

## 2022-12-08 PROCEDURE — 92202 OPSCPY EXTND ON/MAC DRAW: CPT

## 2022-12-08 ASSESSMENT — TONOMETRY
OD_IOP_MMHG: 21
OS_IOP_MMHG: 18

## 2022-12-08 ASSESSMENT — VISUAL ACUITY: OD_CC: 20/25-2

## 2022-12-22 DIAGNOSIS — T78.40XD ALLERGY, SUBSEQUENT ENCOUNTER: ICD-10-CM

## 2022-12-22 RX ORDER — FLUTICASONE PROPIONATE 50 MCG
SPRAY, SUSPENSION (ML) NASAL
Qty: 15.8 ML | Refills: 0 | Status: SHIPPED | OUTPATIENT
Start: 2022-12-22

## 2023-01-10 ENCOUNTER — OFFICE VISIT (OUTPATIENT)
Dept: FAMILY MEDICINE CLINIC | Facility: CLINIC | Age: 88
End: 2023-01-10

## 2023-01-10 VITALS
SYSTOLIC BLOOD PRESSURE: 128 MMHG | HEART RATE: 80 BPM | BODY MASS INDEX: 23.22 KG/M2 | DIASTOLIC BLOOD PRESSURE: 78 MMHG | OXYGEN SATURATION: 96 % | WEIGHT: 136 LBS | HEIGHT: 64 IN

## 2023-01-10 DIAGNOSIS — Z13.0 SCREENING FOR DEFICIENCY ANEMIA: ICD-10-CM

## 2023-01-10 DIAGNOSIS — E78.5 DYSLIPIDEMIA: ICD-10-CM

## 2023-01-10 DIAGNOSIS — Z13.29 SCREENING FOR THYROID DISORDER: ICD-10-CM

## 2023-01-10 DIAGNOSIS — M15.9 PRIMARY OSTEOARTHRITIS INVOLVING MULTIPLE JOINTS: Primary | ICD-10-CM

## 2023-01-10 DIAGNOSIS — M81.0 AGE-RELATED OSTEOPOROSIS WITHOUT CURRENT PATHOLOGICAL FRACTURE: ICD-10-CM

## 2023-01-10 DIAGNOSIS — I10 PRIMARY HYPERTENSION: ICD-10-CM

## 2023-01-10 DIAGNOSIS — R73.03 PRE-DIABETES: ICD-10-CM

## 2023-01-10 DIAGNOSIS — T78.40XD ALLERGY, SUBSEQUENT ENCOUNTER: ICD-10-CM

## 2023-01-10 PROBLEM — T78.40XA ALLERGIES: Status: ACTIVE | Noted: 2023-01-10

## 2023-01-10 RX ORDER — FLUTICASONE PROPIONATE 50 MCG
1 SPRAY, SUSPENSION (ML) NASAL 2 TIMES DAILY
Qty: 54.6 ML | Refills: 0 | Status: SHIPPED | OUTPATIENT
Start: 2023-01-10

## 2023-01-10 NOTE — ASSESSMENT & PLAN NOTE
Patient with age-related osteoporosis could not tolerate the medication recommend continue to take calcium tablets along with vitamin D

## 2023-01-10 NOTE — ASSESSMENT & PLAN NOTE
Patient's blood pressure today is 128/78 currently she is taking lisinopril 20 mg daily we will continue the same no need to adjust the dosage since her pressure was high the last time

## 2023-01-10 NOTE — PROGRESS NOTES
Office Visit Note  01/10/23     Ike Ashford 82 y o  female MRN: [id number]  : 1935    Assessment:     1  Primary osteoarthritis involving multiple joints  Assessment & Plan:  Pain in the fingers and hand on the left side increases with activity x-rays revealing osteoarthritis  Orders:  -     XR hand 3+ vw left; Future; Expected date: 01/10/2023    2  Age-related osteoporosis without current pathological fracture  Assessment & Plan:  Patient with age-related osteoporosis could not tolerate the medication recommend continue to take calcium tablets along with vitamin D       3  Dyslipidemia  Assessment & Plan:  Patient is currently taking atorvastatin 20 mg at bedtime we will continue the same  4  Allergy, subsequent encounter  Assessment & Plan:  Patient with allergy-like symptoms using Flonase nasal spray which appears to be helping we will renew the same    Orders:  -     fluticasone (FLONASE) 50 mcg/act nasal spray; 1 spray into each nostril 2 (two) times a day    5  Primary hypertension        Discussion Summary and Plan: Today's care plan and medications were reviewed with patient in detail and all their questions answered to their satisfaction  Chief Complaint   Patient presents with   • Follow-up      Subjective:  Patient is coming here for evaluation regarding pain discomfort in the left hand and fingers in the interphalangeal joint area for past few days progressively increasing  No injury that she can recall the he has deformity of the fingers  Denies any symptoms of chest pain palpitation shortness of breath  Patient is having difficulty flexing her fingers because of the pain    Medications reviewed labs reviewed      The following portions of the patient's history were reviewed and updated as appropriate: allergies, current medications, past family history, past medical history, past social history, past surgical history and problem list     Review of Systems Constitutional: Negative for chills and fever  HENT: Negative for ear pain and sore throat  Eyes: Negative for pain and visual disturbance  Respiratory: Negative for cough and shortness of breath  Cardiovascular: Negative for chest pain and palpitations  Gastrointestinal: Negative for abdominal pain and vomiting  Genitourinary: Negative for dysuria and hematuria  Musculoskeletal: Positive for arthralgias and back pain  Skin: Negative for color change and rash  Neurological: Negative for seizures and syncope  All other systems reviewed and are negative  Historical Information   Patient Active Problem List   Diagnosis   • UTI (urinary tract infection)   • Primary hypertension   • Dyslipidemia   • Age-related osteoporosis without current pathological fracture   • Osteoarthritis of multiple joints   • Allergies     Past Medical History:   Diagnosis Date   • Arthritis    • GERD (gastroesophageal reflux disease)    • Hyperlipidemia    • Hypertension    • Osteoporosis    • Urinary tract infection      Past Surgical History:   Procedure Laterality Date   • CATARACT EXTRACTION     • COLONOSCOPY N/A 3/2/2017    Procedure: COLONOSCOPY;  Surgeon: Ayah Felix MD;  Location: [place] [address] [facility]; Service:    • JOINT REPLACEMENT Bilateral 2011, 2012    hips   • REFRACTIVE SURGERY Right      Social History     Substance and Sexual Activity   Alcohol Use No    Comment: on holidays     Social History     Substance and Sexual Activity   Drug Use No     Social History     Tobacco Use   Smoking Status Never   Smokeless Tobacco Never     History reviewed  No pertinent family history    Health Maintenance Due   Topic   • Medicare Annual Wellness Visit (AWV)    • Hepatitis B Vaccine (1 of 3 - 3-dose series)   • Pneumococcal Vaccine: 65+ Years (1 - PCV)   • Fall Risk    • COVID-19 Vaccine (3 - Booster for Moderna series)      Meds/Allergies       Current Outpatient Medications:   •  aspirin 81 MG tablet, Take 81 mg by mouth daily, Disp: , Rfl:   •  atorvastatin (LIPITOR) 20 mg tablet, Take 1 tablet (20 mg total) by mouth daily at bedtime, Disp: 90 tablet, Rfl: 1  •  bimatoprost (LUMIGAN) 0 01 % ophthalmic drops, 1 drop daily at bedtime, Disp: , Rfl:   •  calcium carbonate (OS-MACRINA) 600 MG tablet, Take 600 mg by mouth 2 (two) times a day with meals, Disp: , Rfl:   •  Cholecalciferol (Vitamin D) 50 MCG (2000 UT) CAPS, Take by mouth, Disp: , Rfl:   •  fluticasone (FLONASE) 50 mcg/act nasal spray, 1 spray into each nostril 2 (two) times a day, Disp: 54 6 mL, Rfl: 0  •  lisinopril (ZESTRIL) 20 mg tablet, Take 1 tablet (20 mg total) by mouth daily, Disp: 90 tablet, Rfl: 1  •  multivitamin (THERAGRAN) TABS, Take 1 tablet by mouth daily, Disp: , Rfl:       Objective:    Vitals:   /78 (BP Location: Right arm, Patient Position: Sitting, Cuff Size: Standard)   Pulse 80   Ht 5' 4" (1 626 m)   Wt 61 7 kg (136 lb)   LMP  (LMP Unknown)   SpO2 96%   BMI 23 34 kg/m²   Body mass index is 23 34 kg/m²  Vitals:    01/10/23 1408   Weight: 61 7 kg (136 lb)       Physical Exam  Vitals and nursing note reviewed  Constitutional:       Appearance: Normal appearance  Eyes:      Comments: Visually impaired especially in the left eye   Cardiovascular:      Rate and Rhythm: Normal rate and regular rhythm  Heart sounds: Normal heart sounds  Pulmonary:      Effort: Pulmonary effort is normal       Breath sounds: Normal breath sounds  Musculoskeletal:      Cervical back: Normal range of motion and neck supple  Right lower leg: No edema  Left lower leg: No edema  Comments: Patient with deformity of the fingers especially on the left side movements causing discomfort and pain will get x-rays   Neurological:      Mental Status: She is alert  Lab Review   No visits with results within 2 Month(s) from this visit     Latest known visit with results is:   Appointment on 04/26/2022   Component Date Value Ref Range Status • WBC 04/26/2022 4 93  4 31 - 10 16 Thousand/uL Final   • RBC 04/26/2022 4 21  3 81 - 5 12 Million/uL Final   • Hemoglobin 04/26/2022 13 5  11 5 - 15 4 g/dL Final   • Hematocrit 04/26/2022 40 9  34 8 - 46 1 % Final   • MCV 04/26/2022 97  82 - 98 fL Final   • MCH 04/26/2022 32 1  26 8 - 34 3 pg Final   • MCHC 04/26/2022 33 0  31 4 - 37 4 g/dL Final   • RDW 04/26/2022 13 6  11 6 - 15 1 % Final   • MPV 04/26/2022 9 3  8 9 - 12 7 fL Final   • Platelets 84/82/2292 300  149 - 390 Thousands/uL Final   • nRBC 04/26/2022 0  /100 WBCs Final   • Neutrophils Relative 04/26/2022 63  43 - 75 % Final   • Immat GRANS % 04/26/2022 0  0 - 2 % Final   • Lymphocytes Relative 04/26/2022 22  14 - 44 % Final   • Monocytes Relative 04/26/2022 13 (H)  4 - 12 % Final   • Eosinophils Relative 04/26/2022 1  0 - 6 % Final   • Basophils Relative 04/26/2022 1  0 - 1 % Final   • Neutrophils Absolute 04/26/2022 3 11  1 85 - 7 62 Thousands/µL Final   • Immature Grans Absolute 04/26/2022 0 01  0 00 - 0 20 Thousand/uL Final   • Lymphocytes Absolute 04/26/2022 1 06  0 60 - 4 47 Thousands/µL Final   • Monocytes Absolute 04/26/2022 0 65  0 17 - 1 22 Thousand/µL Final   • Eosinophils Absolute 04/26/2022 0 06  0 00 - 0 61 Thousand/µL Final   • Basophils Absolute 04/26/2022 0 04  0 00 - 0 10 Thousands/µL Final   • Sodium 04/26/2022 137  136 - 145 mmol/L Final   • Potassium 04/26/2022 3 8  3 5 - 5 3 mmol/L Final   • Chloride 04/26/2022 105  100 - 108 mmol/L Final   • CO2 04/26/2022 28  21 - 32 mmol/L Final   • ANION GAP 04/26/2022 4  4 - 13 mmol/L Final   • BUN 04/26/2022 12  5 - 25 mg/dL Final   • Creatinine 04/26/2022 0 86  0 60 - 1 30 mg/dL Final    Standardized to IDMS reference method   • Glucose, Fasting 04/26/2022 92  65 - 99 mg/dL Final    Specimen collection should occur prior to Sulfasalazine administration due to the potential for falsely depressed results   Specimen collection should occur prior to Sulfapyridine administration due to the potential for falsely elevated results  • Calcium 04/26/2022 9 5  8 3 - 10 1 mg/dL Final   • AST 04/26/2022 20  5 - 45 U/L Final    Specimen collection should occur prior to Sulfasalazine administration due to the potential for falsely depressed results  • ALT 04/26/2022 24  12 - 78 U/L Final    Specimen collection should occur prior to Sulfasalazine and/or Sulfapyridine administration due to the potential for falsely depressed results  • Alkaline Phosphatase 04/26/2022 73  46 - 116 U/L Final   • Total Protein 04/26/2022 7 3  6 4 - 8 2 g/dL Final   • Albumin 04/26/2022 3 9  3 5 - 5 0 g/dL Final   • Total Bilirubin 04/26/2022 0 52  0 20 - 1 00 mg/dL Final    Use of this assay is not recommended for patients undergoing treatment with eltrombopag due to the potential for falsely elevated results  • eGFR 04/26/2022 61  ml/min/1 73sq m Final   • Hemoglobin A1C 04/26/2022 5 3  Normal 3 8-5 6%; PreDiabetic 5 7-6 4%; Diabetic >=6 5%; Glycemic control for adults with diabetes <7 0% % Final   • EAG 04/26/2022 105  mg/dl Final   • Cholesterol 04/26/2022 156  See Comment mg/dL Final    Cholesterol:         Pediatric <18 Years        Desirable          <170 mg/dL      Borderline High    170-199 mg/dL      High               >=200 mg/dL        Adult >=18 Years            Desirable         <200 mg/dL      Borderline High   200-239 mg/dL      High              >239 mg/dL     • Triglycerides 04/26/2022 165 (H)  See Comment mg/dL Final    Triglyceride:     0-9Y            <75mg/dL     10Y-17Y         <90 mg/dL       >=18Y     Normal          <150 mg/dL     Borderline High 150-199 mg/dL     High            200-499 mg/dL        Very High       >499 mg/dL    Specimen collection should occur prior to N-Acetylcysteine or Metamizole administration due to the potential for falsely depressed results     • HDL, Direct 04/26/2022 41 (L)  >=50 mg/dL Final    Specimen collection should occur prior to Metamizole administration due to the potential for falsley depressed results  • LDL Calculated 04/26/2022 82  0 - 100 mg/dL Final    LDL Cholesterol:     Optimal           <100 mg/dl     Near Optimal      100-129 mg/dl     Above Optimal       Borderline High 130-159 mg/dl       High            160-189 mg/dl       Very High       >189 mg/dl         This screening LDL is a calculated result  It does not have the accuracy of the Direct Measured LDL in the monitoring of patients with hyperlipidemia and/or statin therapy  Direct Measure LDL (CMC317) must be ordered separately in these patients  • Non-HDL-Chol (CHOL-HDL) 04/26/2022 115  mg/dl Final   • Free T4 04/26/2022 1 05  0 76 - 1 46 ng/dL Final    Specimen collection should occur prior to Sulfasalazine administration due to the potential for falsely elevated results  • TSH 3RD GENERATON 04/26/2022 2 410  0 450 - 4 500 uIU/mL Final    The recommended reference ranges for TSH during pregnancy are as follows:   First trimester 0 1 to 2 5 uIU/mL   Second trimester  0 2 to 3 0 uIU/mL   Third trimester 0 3 to 3 0 uIU/m    Note: Normal ranges may not apply to patients who are transgender, non-binary, or whose legal sex, sex at birth, and gender identity differ  Adult TSH (3rd generation) reference range follows the recommended guidelines of the American Thyroid Association, January, 2020  • Vit D, 25-Hydroxy 04/26/2022 62 3  30 0 - 100 0 ng/mL Final         Aaliyah Rdz MD        "This note has been constructed using a voice recognition system  Therefore there may be syntax, spelling, and/or grammatical errors   Please call if you have any questions  "

## 2023-01-10 NOTE — ASSESSMENT & PLAN NOTE
Patient with allergy-like symptoms using Flonase nasal spray which appears to be helping we will renew the same

## 2023-01-10 NOTE — ASSESSMENT & PLAN NOTE
Pain in the fingers and hand on the left side increases with activity x-rays revealing osteoarthritis

## 2023-03-06 ENCOUNTER — APPOINTMENT (OUTPATIENT)
Dept: RADIOLOGY | Facility: CLINIC | Age: 88
End: 2023-03-06

## 2023-03-06 ENCOUNTER — APPOINTMENT (OUTPATIENT)
Dept: LAB | Facility: CLINIC | Age: 88
End: 2023-03-06

## 2023-03-06 DIAGNOSIS — Z13.0 SCREENING FOR DEFICIENCY ANEMIA: ICD-10-CM

## 2023-03-06 DIAGNOSIS — E78.5 DYSLIPIDEMIA: ICD-10-CM

## 2023-03-06 DIAGNOSIS — M15.9 PRIMARY OSTEOARTHRITIS INVOLVING MULTIPLE JOINTS: ICD-10-CM

## 2023-03-06 DIAGNOSIS — Z13.29 SCREENING FOR THYROID DISORDER: ICD-10-CM

## 2023-03-06 DIAGNOSIS — R73.03 PRE-DIABETES: ICD-10-CM

## 2023-03-06 LAB
ALBUMIN SERPL BCP-MCNC: 4.1 G/DL (ref 3.5–5)
ALP SERPL-CCNC: 59 U/L (ref 46–116)
ALT SERPL W P-5'-P-CCNC: 19 U/L (ref 12–78)
ANION GAP SERPL CALCULATED.3IONS-SCNC: 5 MMOL/L (ref 4–13)
AST SERPL W P-5'-P-CCNC: 16 U/L (ref 5–45)
BACTERIA UR QL AUTO: ABNORMAL /HPF
BASOPHILS # BLD AUTO: 0.04 THOUSANDS/ÂΜL (ref 0–0.1)
BASOPHILS NFR BLD AUTO: 1 % (ref 0–1)
BILIRUB SERPL-MCNC: 0.55 MG/DL (ref 0.2–1)
BILIRUB UR QL STRIP: NEGATIVE
BUN SERPL-MCNC: 13 MG/DL (ref 5–25)
CALCIUM SERPL-MCNC: 9.5 MG/DL (ref 8.3–10.1)
CHLORIDE SERPL-SCNC: 108 MMOL/L (ref 96–108)
CHOLEST SERPL-MCNC: 164 MG/DL
CLARITY UR: CLEAR
CO2 SERPL-SCNC: 25 MMOL/L (ref 21–32)
COLOR UR: COLORLESS
CREAT SERPL-MCNC: 0.82 MG/DL (ref 0.6–1.3)
EOSINOPHIL # BLD AUTO: 0.08 THOUSAND/ÂΜL (ref 0–0.61)
EOSINOPHIL NFR BLD AUTO: 2 % (ref 0–6)
ERYTHROCYTE [DISTWIDTH] IN BLOOD BY AUTOMATED COUNT: 13 % (ref 11.6–15.1)
GFR SERPL CREATININE-BSD FRML MDRD: 65 ML/MIN/1.73SQ M
GLUCOSE P FAST SERPL-MCNC: 84 MG/DL (ref 65–99)
GLUCOSE UR STRIP-MCNC: NEGATIVE MG/DL
HCT VFR BLD AUTO: 40 % (ref 34.8–46.1)
HDLC SERPL-MCNC: 36 MG/DL
HGB BLD-MCNC: 12.6 G/DL (ref 11.5–15.4)
HGB UR QL STRIP.AUTO: NEGATIVE
IMM GRANULOCYTES # BLD AUTO: 0.01 THOUSAND/UL (ref 0–0.2)
IMM GRANULOCYTES NFR BLD AUTO: 0 % (ref 0–2)
KETONES UR STRIP-MCNC: NEGATIVE MG/DL
LDLC SERPL CALC-MCNC: 91 MG/DL (ref 0–100)
LEUKOCYTE ESTERASE UR QL STRIP: ABNORMAL
LYMPHOCYTES # BLD AUTO: 1.62 THOUSANDS/ÂΜL (ref 0.6–4.47)
LYMPHOCYTES NFR BLD AUTO: 32 % (ref 14–44)
MCH RBC QN AUTO: 30.7 PG (ref 26.8–34.3)
MCHC RBC AUTO-ENTMCNC: 31.5 G/DL (ref 31.4–37.4)
MCV RBC AUTO: 97 FL (ref 82–98)
MONOCYTES # BLD AUTO: 0.54 THOUSAND/ÂΜL (ref 0.17–1.22)
MONOCYTES NFR BLD AUTO: 11 % (ref 4–12)
NEUTROPHILS # BLD AUTO: 2.72 THOUSANDS/ÂΜL (ref 1.85–7.62)
NEUTS SEG NFR BLD AUTO: 54 % (ref 43–75)
NITRITE UR QL STRIP: NEGATIVE
NON-SQ EPI CELLS URNS QL MICRO: ABNORMAL /HPF
NONHDLC SERPL-MCNC: 128 MG/DL
NRBC BLD AUTO-RTO: 0 /100 WBCS
PH UR STRIP.AUTO: 7.5 [PH]
PLATELET # BLD AUTO: 314 THOUSANDS/UL (ref 149–390)
PMV BLD AUTO: 9.7 FL (ref 8.9–12.7)
POTASSIUM SERPL-SCNC: 4 MMOL/L (ref 3.5–5.3)
PROT SERPL-MCNC: 7.2 G/DL (ref 6.4–8.4)
PROT UR STRIP-MCNC: NEGATIVE MG/DL
RBC # BLD AUTO: 4.11 MILLION/UL (ref 3.81–5.12)
RBC #/AREA URNS AUTO: ABNORMAL /HPF
SODIUM SERPL-SCNC: 138 MMOL/L (ref 135–147)
SP GR UR STRIP.AUTO: 1.01 (ref 1–1.03)
TRIGL SERPL-MCNC: 187 MG/DL
TSH SERPL DL<=0.05 MIU/L-ACNC: 1.57 UIU/ML (ref 0.45–4.5)
UROBILINOGEN UR STRIP-ACNC: <2 MG/DL
WBC # BLD AUTO: 5.01 THOUSAND/UL (ref 4.31–10.16)
WBC #/AREA URNS AUTO: ABNORMAL /HPF

## 2023-03-07 LAB
EST. AVERAGE GLUCOSE BLD GHB EST-MCNC: 111 MG/DL
HBA1C MFR BLD: 5.5 %

## 2023-03-08 LAB — BACTERIA UR CULT: ABNORMAL

## 2023-03-15 ENCOUNTER — OFFICE VISIT (OUTPATIENT)
Dept: FAMILY MEDICINE CLINIC | Facility: CLINIC | Age: 88
End: 2023-03-15

## 2023-03-15 VITALS
DIASTOLIC BLOOD PRESSURE: 74 MMHG | HEART RATE: 80 BPM | WEIGHT: 137 LBS | BODY MASS INDEX: 23.39 KG/M2 | HEIGHT: 64 IN | OXYGEN SATURATION: 94 % | SYSTOLIC BLOOD PRESSURE: 122 MMHG

## 2023-03-15 DIAGNOSIS — E78.5 DYSLIPIDEMIA: ICD-10-CM

## 2023-03-15 DIAGNOSIS — Z00.00 ROUTINE GENERAL MEDICAL EXAMINATION AT A HEALTH CARE FACILITY: ICD-10-CM

## 2023-03-15 DIAGNOSIS — N39.0 URINARY TRACT INFECTION WITHOUT HEMATURIA, SITE UNSPECIFIED: Primary | ICD-10-CM

## 2023-03-15 DIAGNOSIS — M81.0 AGE-RELATED OSTEOPOROSIS WITHOUT CURRENT PATHOLOGICAL FRACTURE: ICD-10-CM

## 2023-03-15 DIAGNOSIS — T78.40XD ALLERGY, SUBSEQUENT ENCOUNTER: ICD-10-CM

## 2023-03-15 DIAGNOSIS — M15.9 PRIMARY OSTEOARTHRITIS INVOLVING MULTIPLE JOINTS: ICD-10-CM

## 2023-03-15 DIAGNOSIS — I10 PRIMARY HYPERTENSION: ICD-10-CM

## 2023-03-15 DIAGNOSIS — Z00.00 MEDICARE ANNUAL WELLNESS VISIT, SUBSEQUENT: ICD-10-CM

## 2023-03-15 RX ORDER — SULFAMETHOXAZOLE AND TRIMETHOPRIM 800; 160 MG/1; MG/1
1 TABLET ORAL 2 TIMES DAILY
Qty: 6 TABLET | Refills: 0 | Status: SHIPPED | OUTPATIENT
Start: 2023-03-15 | End: 2023-03-18

## 2023-03-15 NOTE — PROGRESS NOTES
Assessment and Plan:     Problem List Items Addressed This Visit    None       Preventive health issues were discussed with patient, and age appropriate screening tests were ordered as noted in patient's After Visit Summary  Personalized health advice and appropriate referrals for health education or preventive services given if needed, as noted in patient's After Visit Summary  History of Present Illness:     Patient presents for a Medicare Wellness Visit    HPI   Patient Care Team:  Otto Mayo MD as PCP - General (Internal Medicine)  Tanisha Roche MD as Endoscopist     Review of Systems:     Review of Systems     Problem List:     Patient Active Problem List   Diagnosis   • UTI (urinary tract infection)   • Primary hypertension   • Dyslipidemia   • Age-related osteoporosis without current pathological fracture   • Osteoarthritis of multiple joints   • Allergies      Past Medical and Surgical History:     Past Medical History:   Diagnosis Date   • Arthritis    • GERD (gastroesophageal reflux disease)    • Hyperlipidemia    • Hypertension    • Osteoporosis    • Urinary tract infection      Past Surgical History:   Procedure Laterality Date   • CATARACT EXTRACTION     • COLONOSCOPY N/A 3/2/2017    Procedure: COLONOSCOPY;  Surgeon: Tanisha Roche MD;  Location: [facility]; Service:    • JOINT REPLACEMENT Bilateral 2011, 2012    hips   • REFRACTIVE SURGERY Right       Family History:     No family history on file     Social History:     Social History     Socioeconomic History   • Marital status: Single     Spouse name: None   • Number of children: None   • Years of education: None   • Highest education level: None   Occupational History   • None   Tobacco Use   • Smoking status: Never   • Smokeless tobacco: Never   Substance and Sexual Activity   • Alcohol use: No     Comment: on holidays   • Drug use: No   • Sexual activity: Never   Other Topics Concern   • None   Social History Narrative   • None Social Determinants of Health     Financial Resource Strain: Not on file   Food Insecurity: Not on file   Transportation Needs: Not on file   Physical Activity: Not on file   Stress: Not on file   Social Connections: Not on file   Intimate Partner Violence: Not on file   Housing Stability: Not on file      Medications and Allergies:     Current Outpatient Medications   Medication Sig Dispense Refill   • aspirin 81 MG tablet Take 81 mg by mouth daily     • atorvastatin (LIPITOR) 20 mg tablet Take 1 tablet (20 mg total) by mouth daily at bedtime 90 tablet 1   • bimatoprost (LUMIGAN) 0 01 % ophthalmic drops 1 drop daily at bedtime     • calcium carbonate (OS-MACRINA) 600 MG tablet Take 600 mg by mouth 2 (two) times a day with meals     • Cholecalciferol (Vitamin D) 50 MCG (2000 UT) CAPS Take by mouth     • fluticasone (FLONASE) 50 mcg/act nasal spray 1 spray into each nostril 2 (two) times a day 54 6 mL 0   • lisinopril (ZESTRIL) 20 mg tablet Take 1 tablet (20 mg total) by mouth daily 90 tablet 1   • multivitamin (THERAGRAN) TABS Take 1 tablet by mouth daily       No current facility-administered medications for this visit  No Known Allergies   Immunizations:     Immunization History   Administered Date(s) Administered   • COVID-19 MODERNA VACC 0 5 ML IM 02/25/2021, 03/25/2021   • Influenza, high dose seasonal 0 7 mL 11/03/2022      Health Maintenance: There are no preventive care reminders to display for this patient  Topic Date Due   • Pneumococcal Vaccine: 65+ Years (1 - PCV) Never done   • COVID-19 Vaccine (3 - Booster for Iban Lull series) 05/20/2021      Medicare Screening Tests and Risk Assessments:     Annual Wellness Visit  No results found       Physical Exam:     Pulse (!) 107   Ht 5' 4" (1 626 m)   Wt 62 1 kg (137 lb)   LMP  (LMP Unknown)   SpO2 94%   BMI 23 52 kg/m²     Physical Exam     Rigoberto Perez MD

## 2023-03-15 NOTE — ASSESSMENT & PLAN NOTE
Patient with osteoarthritis of multiple joints especially the fingers with some deformity x-rays revealing osteoarthritis

## 2023-03-15 NOTE — ASSESSMENT & PLAN NOTE
Patient with osteoporosis could not tolerate medications does not want to be on any medication at present time  Currently she is taking calcium tablets along with vitamin D we will continue with the same

## 2023-03-15 NOTE — PATIENT INSTRUCTIONS
Medicare Preventive Visit Patient Instructions  Thank you for completing your Welcome to Medicare Visit or Medicare Annual Wellness Visit today  Your next wellness visit will be due in one year (3/15/2024)  The screening/preventive services that you may require over the next 5-10 years are detailed below  Some tests may not apply to you based off risk factors and/or age  Screening tests ordered at today's visit but not completed yet may show as past due  Also, please note that scanned in results may not display below  Preventive Screenings:  Service Recommendations Previous Testing/Comments   Colorectal Cancer Screening  * Colonoscopy    * Fecal Occult Blood Test (FOBT)/Fecal Immunochemical Test (FIT)  * Fecal DNA/Cologuard Test  * Flexible Sigmoidoscopy Age: 39-70 years old   Colonoscopy: every 10 years (may be performed more frequently if at higher risk)  OR  FOBT/FIT: every 1 year  OR  Cologuard: every 3 years  OR  Sigmoidoscopy: every 5 years  Screening may be recommended earlier than age 39 if at higher risk for colorectal cancer  Also, an individualized decision between you and your healthcare provider will decide whether screening between the ages of 74-80 would be appropriate  Colonoscopy: Not on file  FOBT/FIT: Not on file  Cologuard: Not on file  Sigmoidoscopy: Not on file    Screening Not Indicated     Breast Cancer Screening Age: 36 years old  Frequency: every 1-2 years  Not required if history of left and right mastectomy Mammogram: 05/07/2021    Screening Current   Cervical Cancer Screening Between the ages of 21-29, pap smear recommended once every 3 years  Between the ages of 33-67, can perform pap smear with HPV co-testing every 5 years     Recommendations may differ for women with a history of total hysterectomy, cervical cancer, or abnormal pap smears in past  Pap Smear: Not on file    Screening Not Indicated   Hepatitis C Screening Once for adults born between 1945 and 1965  More frequently in patients at high risk for Hepatitis C Hep C Antibody: Not on file        Diabetes Screening 1-2 times per year if you're at risk for diabetes or have pre-diabetes Fasting glucose: 84 mg/dL (3/6/2023)  A1C: 5 5 % (3/6/2023)  Screening Not Indicated  History Diabetes   Cholesterol Screening Once every 5 years if you don't have a lipid disorder  May order more often based on risk factors  Lipid panel: 03/06/2023    Screening Current     Other Preventive Screenings Covered by Medicare:  1  Abdominal Aortic Aneurysm (AAA) Screening: covered once if your at risk  You're considered to be at risk if you have a family history of AAA  2  Lung Cancer Screening: covers low dose CT scan once per year if you meet all of the following conditions: (1) Age 50-69; (2) No signs or symptoms of lung cancer; (3) Current smoker or have quit smoking within the last 15 years; (4) You have a tobacco smoking history of at least 20 pack years (packs per day multiplied by number of years you smoked); (5) You get a written order from a healthcare provider  3  Glaucoma Screening: covered annually if you're considered high risk: (1) You have diabetes OR (2) Family history of glaucoma OR (3)  aged 48 and older OR (3)  American aged 72 and older  3  Osteoporosis Screening: covered every 2 years if you meet one of the following conditions: (1) You're estrogen deficient and at risk for osteoporosis based off medical history and other findings; (2) Have a vertebral abnormality; (3) On glucocorticoid therapy for more than 3 months; (4) Have primary hyperparathyroidism; (5) On osteoporosis medications and need to assess response to drug therapy  · Last bone density test (DXA Scan): 02/26/2020   5  HIV Screening: covered annually if you're between the age of 15-65  Also covered annually if you are younger than 13 and older than 72 with risk factors for HIV infection   For pregnant patients, it is covered up to 3 times per pregnancy  Immunizations:  Immunization Recommendations   Influenza Vaccine Annual influenza vaccination during flu season is recommended for all persons aged >= 6 months who do not have contraindications   Pneumococcal Vaccine   * Pneumococcal conjugate vaccine = PCV13 (Prevnar 13), PCV15 (Vaxneuvance), PCV20 (Prevnar 20)  * Pneumococcal polysaccharide vaccine = PPSV23 (Pneumovax) Adults 25-60 years old: 1-3 doses may be recommended based on certain risk factors  Adults 72 years old: 1-2 doses may be recommended based off what pneumonia vaccine you previously received   Hepatitis B Vaccine 3 dose series if at intermediate or high risk (ex: diabetes, end stage renal disease, liver disease)   Tetanus (Td) Vaccine - COST NOT COVERED BY MEDICARE PART B Following completion of primary series, a booster dose should be given every 10 years to maintain immunity against tetanus  Td may also be given as tetanus wound prophylaxis  Tdap Vaccine - COST NOT COVERED BY MEDICARE PART B Recommended at least once for all adults  For pregnant patients, recommended with each pregnancy  Shingles Vaccine (Shingrix) - COST NOT COVERED BY MEDICARE PART B  2 shot series recommended in those aged 48 and above     Health Maintenance Due:  There are no preventive care reminders to display for this patient  Immunizations Due:      Topic Date Due   • Pneumococcal Vaccine: 65+ Years (1 - PCV) Never done   • COVID-19 Vaccine (3 - Booster for Moderna series) 05/20/2021     Advance Directives   What are advance directives? Advance directives are legal documents that state your wishes and plans for medical care  These plans are made ahead of time in case you lose your ability to make decisions for yourself  Advance directives can apply to any medical decision, such as the treatments you want, and if you want to donate organs  What are the types of advance directives?   There are many types of advance directives, and each state has rules about how to use them  You may choose a combination of any of the following:  · Living will: This is a written record of the treatment you want  You can also choose which treatments you do not want, which to limit, and which to stop at a certain time  This includes surgery, medicine, IV fluid, and tube feedings  · Durable power of  for healthcare [facility]): This is a written record that states who you want to make healthcare choices for you when you are unable to make them for yourself  This person, called a proxy, is usually a family member or a friend  You may choose more than 1 proxy  · Do not resuscitate (DNR) order:  A DNR order is used in case your heart stops beating or you stop breathing  It is a request not to have certain forms of treatment, such as CPR  A DNR order may be included in other types of advance directives  · Medical directive: This covers the care that you want if you are in a coma, near death, or unable to make decisions for yourself  You can list the treatments you want for each condition  Treatment may include pain medicine, surgery, blood transfusions, dialysis, IV or tube feedings, and a ventilator (breathing machine)  · Values history: This document has questions about your views, beliefs, and how you feel and think about life  This information can help others choose the care that you would choose  Why are advance directives important? An advance directive helps you control your care  Although spoken wishes may be used, it is better to have your wishes written down  Spoken wishes can be misunderstood, or not followed  Treatments may be given even if you do not want them  An advance directive may make it easier for your family to make difficult choices about your care  © Copyright [employer] 2018 Information is for End User's use only and may not be sold, redistributed or otherwise used for commercial purposes   All illustrations and images included in CareNotes® are the copyrighted property of [employer]  or [address] [place], [place] [address]luis khoi

## 2023-03-15 NOTE — PROGRESS NOTES
Assessment and Plan:     Problem List Items Addressed This Visit        Cardiovascular and Mediastinum    Primary hypertension     Patient blood pressure is 122/74 currently on lisinopril 20 mg daily we will continue with the same  Musculoskeletal and Integument    Age-related osteoporosis without current pathological fracture     Patient with osteoporosis could not tolerate medications does not want to be on any medication at present time  Currently she is taking calcium tablets along with vitamin D we will continue with the same  Osteoarthritis of multiple joints     Patient with osteoarthritis of multiple joints especially the fingers with some deformity x-rays revealing osteoarthritis            Genitourinary    UTI (urinary tract infection) - Primary     Urinalysis culture is revealing E  coli more than 100,000 colonies sensitive to multiple medications we will give her Bactrim DS for 3 days         Relevant Medications    sulfamethoxazole-trimethoprim (BACTRIM DS) 800-160 mg per tablet       Other    Dyslipidemia     Patient is currently taking atorvastatin 20 mg lipid profile shows cholesterol at 164 triglycerides 187 HDL 36 LDL is 91 we will continue with the same dosage for now  Allergies     Patient has allergy symptoms runny nose on and off uses Flonase nasal spray which appears to be helping we will continue  Other Visit Diagnoses     Medicare annual wellness visit, subsequent        Routine general medical examination at a health care facility              Depression Screening and Follow-up Plan: Patient was screened for depression during today's encounter  They screened negative with a PHQ-2 score of 0  Preventive health issues were discussed with patient, and age appropriate screening tests were ordered as noted in patient's After Visit Summary    Personalized health advice and appropriate referrals for health education or preventive services given if needed, as noted in patient's After Visit Summary  History of Present Illness:     Patient presents for a Medicare Wellness Visit    Patient is coming here for a follow-up evaluation with regards to her symptoms of hypertension, hypercholesterolemia, osteoporosis  I reviewed all the lab work with the patient urine is suggestive of urinary tract infection with E  coli  Patient denies fever abdominal pain flank pain no burning sensation passing urine  Medications reviewed  Advanced directives reviewed at length  Her neighbor is her power of  for healthcare  Patient Care Team:  Manuela Dong MD as PCP - General (Internal Medicine)  Lesley Kessler MD as Endoscopist     Review of Systems:     Review of Systems   Constitutional: Negative for chills and fever  HENT: Negative for ear pain and sore throat  Eyes: Negative for pain and visual disturbance  Blind left eye   Respiratory: Negative for cough and shortness of breath  Cardiovascular: Negative for chest pain and palpitations  Gastrointestinal: Negative for abdominal pain and vomiting  Genitourinary: Negative for dysuria and hematuria  Musculoskeletal: Positive for arthralgias and back pain  Skin: Negative for color change and rash  Neurological: Negative for seizures and syncope  All other systems reviewed and are negative         Problem List:     Patient Active Problem List   Diagnosis   • UTI (urinary tract infection)   • Primary hypertension   • Dyslipidemia   • Age-related osteoporosis without current pathological fracture   • Osteoarthritis of multiple joints   • Allergies      Past Medical and Surgical History:     Past Medical History:   Diagnosis Date   • Arthritis    • GERD (gastroesophageal reflux disease)    • Hyperlipidemia    • Hypertension    • Osteoporosis    • Urinary tract infection      Past Surgical History:   Procedure Laterality Date   • CATARACT EXTRACTION     • COLONOSCOPY N/A 3/2/2017    Procedure: COLONOSCOPY;  Surgeon: Kade Morrison MD;  Location: [facility]; Service:    • JOINT REPLACEMENT Bilateral 2011, 2012    hips   • REFRACTIVE SURGERY Right       Family History:     History reviewed  No pertinent family history  Social History:     Social History     Socioeconomic History   • Marital status: Single     Spouse name: None   • Number of children: None   • Years of education: None   • Highest education level: None   Occupational History   • None   Tobacco Use   • Smoking status: Never   • Smokeless tobacco: Never   Substance and Sexual Activity   • Alcohol use: No     Comment: on holidays   • Drug use: No   • Sexual activity: Never   Other Topics Concern   • None   Social History Narrative   • None     Social Determinants of Health     Financial Resource Strain: Low Risk    • Difficulty of Paying Living Expenses: Not very hard   Food Insecurity: Not on file   Transportation Needs: No Transportation Needs   • Lack of Transportation (Medical): No   • Lack of Transportation (Non-Medical):  No   Physical Activity: Not on file   Stress: Not on file   Social Connections: Not on file   Intimate Partner Violence: Not on file   Housing Stability: Not on file      Medications and Allergies:     Current Outpatient Medications   Medication Sig Dispense Refill   • aspirin 81 MG tablet Take 81 mg by mouth daily     • atorvastatin (LIPITOR) 20 mg tablet Take 1 tablet (20 mg total) by mouth daily at bedtime 90 tablet 1   • bimatoprost (LUMIGAN) 0 01 % ophthalmic drops 1 drop daily at bedtime     • calcium carbonate (OS-MACRINA) 600 MG tablet Take 600 mg by mouth 2 (two) times a day with meals     • Cholecalciferol (Vitamin D) 50 MCG (2000 UT) CAPS Take by mouth     • fluticasone (FLONASE) 50 mcg/act nasal spray 1 spray into each nostril 2 (two) times a day 54 6 mL 0   • lisinopril (ZESTRIL) 20 mg tablet Take 1 tablet (20 mg total) by mouth daily 90 tablet 1   • multivitamin (THERAGRAN) TABS Take 1 tablet by mouth daily • sulfamethoxazole-trimethoprim (BACTRIM DS) 800-160 mg per tablet Take 1 tablet by mouth 2 (two) times a day for 3 days 6 tablet 0     No current facility-administered medications for this visit  No Known Allergies   Immunizations:     Immunization History   Administered Date(s) Administered   • COVID-19 MODERNA VACC 0 5 ML IM 02/25/2021, 03/25/2021   • Influenza, high dose seasonal 0 7 mL 11/03/2022      Health Maintenance: There are no preventive care reminders to display for this patient  Topic Date Due   • Pneumococcal Vaccine: 65+ Years (1 - PCV) Never done   • COVID-19 Vaccine (3 - Booster for Hedrick Slocumb series) 05/20/2021      Medicare Screening Tests and Risk Assessments:     Erni Catherine is here for her Subsequent Wellness visit  Last Medicare Wellness visit information reviewed, patient interviewed and updates made to the record today  Health Risk Assessment:   Patient rates overall health as very good  Patient feels that their physical health rating is slightly better  Patient is satisfied with their life  Eyesight was rated as same  Hearing was rated as same  Patient feels that their emotional and mental health rating is same  Patients states they are never, rarely angry  Patient states they are sometimes unusually tired/fatigued  Pain experienced in the last 7 days has been none  Patient states that she has experienced no weight loss or gain in last 6 months  The patient appears to be quite able to enjoy her life  Depression Screening:   PHQ-2 Score: 0      Fall Risk Screening: In the past year, patient has experienced: no history of falling in past year      Urinary Incontinence Screening:   Patient has not leaked urine accidently in the last six months  Home Safety:  Patient does not have trouble with stairs inside or outside of their home  Patient has working smoke alarms and has no working carbon monoxide detector  Home safety hazards include: none   The patient feels safe in her environment    Nutrition:   Current diet is Low Cholesterol and Limited junk food  The patient eats as healthy as can be, with fruits and vegetables, the patient enjoys chicken and turkey as her protein sources  Medications:   Patient is currently taking over-the-counter supplements  OTC medications include: see medication list  Patient is able to manage medications  The patient is able to manage her medications  Activities of Daily Living (ADLs)/Instrumental Activities of Daily Living (IADLs):   Walk and transfer into and out of bed and chair?: Yes  Dress and groom yourself?: Yes    Bathe or shower yourself?: Yes    Feed yourself? Yes  Do your laundry/housekeeping?: Yes  Manage your money, pay your bills and track your expenses?: Yes  Make your own meals?: Yes    Do your own shopping?: Yes    ADL comments: The patient is independent in her ADL's  Previous Hospitalizations:   Any hospitalizations or ED visits within the last 12 months?: Yes      Hospitalization Comments: Last March outpatient at [facility] for an eye procedure  Advance Care Planning:   Living will: Yes    Durable POA for healthcare: Yes    Advanced directive: Yes    Five wishes given: No    End of Life Decisions reviewed with patient: Yes    Provider agrees with end of life decisions: Yes      Comments: Patient does not want intubation or tube feeding  She wants DNR    Her neighbor is the power of  for healthcare    Cognitive Screening:   Provider or family/friend/caregiver concerned regarding cognition?: No    PREVENTIVE SCREENINGS      Cardiovascular Screening:    General: Screening Current      Diabetes Screening:     General: Screening Not Indicated and History Diabetes      Colorectal Cancer Screening:     General: Screening Not Indicated      Breast Cancer Screening:     General: Screening Current      Cervical Cancer Screening:    General: Screening Not Indicated      Osteoporosis Screening:    General: Screening Not Indicated and History Osteoporosis      Abdominal Aortic Aneurysm (AAA) Screening:        General: Screening Not Indicated      Lung Cancer Screening:     General: Screening Not Indicated      Hepatitis C Screening:    General: Screening Not Indicated    Screening, Brief Intervention, and Referral to Treatment (SBIRT)    Screening  Typical number of drinks in a day: 0  Typical number of drinks in a week: 0  Interpretation: Low risk drinking behavior  AUDIT-C Screenin) How often did you have a drink containing alcohol in the past year? never  2) How many drinks did you have on a typical day when you were drinking in the past year? 0  3) How often did you have 6 or more drinks on one occasion in the past year? never    AUDIT-C Score: 0  Interpretation: Score 0-2 (female): Negative screen for alcohol misuse    Single Item Drug Screening:  How often have you used an illegal drug (including marijuana) or a prescription medication for non-medical reasons in the past year? never    Single Item Drug Screen Score: 0  Interpretation: Negative screen for possible drug use disorder    Brief Intervention  Alcohol & drug use screenings were reviewed  No concerns regarding substance use disorder identified  Other Counseling Topics:   Car/seat belt/driving safety and calcium and vitamin D intake and regular weightbearing exercise  No results found  Physical Exam:     /74 (BP Location: Right arm, Patient Position: Sitting, Cuff Size: Standard)   Pulse 80   Ht 5' 4" (1 626 m)   Wt 62 1 kg (137 lb)   LMP  (LMP Unknown)   SpO2 94%   BMI 23 52 kg/m²     Physical Exam  Vitals and nursing note reviewed  Constitutional:       General: She is not in acute distress  Appearance: She is well-developed  HENT:      Head: Normocephalic and atraumatic  Eyes:      General:         Right eye: No discharge  Left eye: No discharge        Comments: Patient is blind in the left eye   Cardiovascular: Rate and Rhythm: Normal rate and regular rhythm  Heart sounds: No murmur heard  Pulmonary:      Effort: Pulmonary effort is normal  No respiratory distress  Breath sounds: Normal breath sounds  Abdominal:      Palpations: Abdomen is soft  Tenderness: There is no abdominal tenderness  Musculoskeletal:         General: No swelling  Cervical back: Neck supple  Skin:     General: Skin is warm and dry  Capillary Refill: Capillary refill takes less than 2 seconds  Neurological:      Mental Status: She is alert     Psychiatric:         Mood and Affect: Mood normal           Dante Romero MD

## 2023-03-15 NOTE — ASSESSMENT & PLAN NOTE
Patient has allergy symptoms runny nose on and off uses Flonase nasal spray which appears to be helping we will continue

## 2023-03-15 NOTE — ASSESSMENT & PLAN NOTE
Patient is currently taking atorvastatin 20 mg lipid profile shows cholesterol at 164 triglycerides 187 HDL 36 LDL is 91 we will continue with the same dosage for now  Quality 130: Documentation Of Current Medications In The Medical Record: Current Medications Documented Quality 110: Preventive Care And Screening: Influenza Immunization: Influenza Immunization previously received during influenza season Quality 431: Preventive Care And Screening: Unhealthy Alcohol Use - Screening: Patient not identified as an unhealthy alcohol user when screened for unhealthy alcohol use using a systematic screening method Detail Level: Detailed Quality 226: Preventive Care And Screening: Tobacco Use: Screening And Cessation Intervention: Patient screened for tobacco use and is an ex/non-smoker

## 2023-03-15 NOTE — ASSESSMENT & PLAN NOTE
Patient blood pressure is 122/74 currently on lisinopril 20 mg daily we will continue with the same

## 2023-03-15 NOTE — PROGRESS NOTES
Assessment and Plan:     Problem List Items Addressed This Visit    None       Preventive health issues were discussed with patient, and age appropriate screening tests were ordered as noted in patient's After Visit Summary  Personalized health advice and appropriate referrals for health education or preventive services given if needed, as noted in patient's After Visit Summary  History of Present Illness:     Patient presents for a Medicare Wellness Visit    HPI   Patient Care Team:  Anastasia Bates MD as PCP - General (Internal Medicine)  Rylan Sarabia MD as Endoscopist     Review of Systems:     Review of Systems     Problem List:     Patient Active Problem List   Diagnosis   • UTI (urinary tract infection)   • Primary hypertension   • Dyslipidemia   • Age-related osteoporosis without current pathological fracture   • Osteoarthritis of multiple joints   • Allergies      Past Medical and Surgical History:     Past Medical History:   Diagnosis Date   • Arthritis    • GERD (gastroesophageal reflux disease)    • Hyperlipidemia    • Hypertension    • Osteoporosis    • Urinary tract infection      Past Surgical History:   Procedure Laterality Date   • CATARACT EXTRACTION     • COLONOSCOPY N/A 3/2/2017    Procedure: COLONOSCOPY;  Surgeon: Rylan Sarabia MD;  Location: [place] [address] GI LAB; Service:    • JOINT REPLACEMENT Bilateral 2011, 2012    hips   • REFRACTIVE SURGERY Right       Family History:     No family history on file     Social History:     Social History     Socioeconomic History   • Marital status: Single     Spouse name: None   • Number of children: None   • Years of education: None   • Highest education level: None   Occupational History   • None   Tobacco Use   • Smoking status: Never   • Smokeless tobacco: Never   Substance and Sexual Activity   • Alcohol use: No     Comment: on holidays   • Drug use: No   • Sexual activity: Never   Other Topics Concern   • None   Social History Narrative   • None Social Determinants of Health     Financial Resource Strain: Not on file   Food Insecurity: Not on file   Transportation Needs: Not on file   Physical Activity: Not on file   Stress: Not on file   Social Connections: Not on file   Intimate Partner Violence: Not on file   Housing Stability: Not on file      Medications and Allergies:     Current Outpatient Medications   Medication Sig Dispense Refill   • aspirin 81 MG tablet Take 81 mg by mouth daily     • atorvastatin (LIPITOR) 20 mg tablet Take 1 tablet (20 mg total) by mouth daily at bedtime 90 tablet 1   • bimatoprost (LUMIGAN) 0 01 % ophthalmic drops 1 drop daily at bedtime     • calcium carbonate (OS-MACRINA) 600 MG tablet Take 600 mg by mouth 2 (two) times a day with meals     • Cholecalciferol (Vitamin D) 50 MCG (2000 UT) CAPS Take by mouth     • fluticasone (FLONASE) 50 mcg/act nasal spray 1 spray into each nostril 2 (two) times a day 54 6 mL 0   • lisinopril (ZESTRIL) 20 mg tablet Take 1 tablet (20 mg total) by mouth daily 90 tablet 1   • multivitamin (THERAGRAN) TABS Take 1 tablet by mouth daily       No current facility-administered medications for this visit  No Known Allergies   Immunizations:     Immunization History   Administered Date(s) Administered   • COVID-19 MODERNA VACC 0 5 ML IM 02/25/2021, 03/25/2021   • Influenza, high dose seasonal 0 7 mL 11/03/2022      Health Maintenance: There are no preventive care reminders to display for this patient  Topic Date Due   • Pneumococcal Vaccine: 65+ Years (1 - PCV) Never done   • COVID-19 Vaccine (3 - Booster for Shirleyann Dome series) 05/20/2021      Medicare Screening Tests and Risk Assessments:     Annual Wellness Visit  No results found       Physical Exam:     Pulse (!) 107   Ht 5' 4" (1 626 m)   Wt 62 1 kg (137 lb)   LMP  (LMP Unknown)   SpO2 94%   BMI 23 52 kg/m²     Physical Exam     Tom Martin MD

## 2023-03-15 NOTE — ASSESSMENT & PLAN NOTE
Urinalysis culture is revealing E  coli more than 100,000 colonies sensitive to multiple medications we will give her Bactrim DS for 3 days

## 2023-05-02 DIAGNOSIS — E78.5 DYSLIPIDEMIA: ICD-10-CM

## 2023-05-02 RX ORDER — ATORVASTATIN CALCIUM 20 MG/1
20 TABLET, FILM COATED ORAL
Qty: 90 TABLET | Refills: 1 | Status: SHIPPED | OUTPATIENT
Start: 2023-05-02

## 2023-05-16 ENCOUNTER — OFFICE VISIT (OUTPATIENT)
Dept: FAMILY MEDICINE CLINIC | Facility: CLINIC | Age: 88
End: 2023-05-16

## 2023-05-16 VITALS
BODY MASS INDEX: 23.29 KG/M2 | HEART RATE: 69 BPM | SYSTOLIC BLOOD PRESSURE: 126 MMHG | DIASTOLIC BLOOD PRESSURE: 76 MMHG | HEIGHT: 64 IN | OXYGEN SATURATION: 92 % | WEIGHT: 136.4 LBS

## 2023-05-16 DIAGNOSIS — M81.0 AGE-RELATED OSTEOPOROSIS WITHOUT CURRENT PATHOLOGICAL FRACTURE: Primary | ICD-10-CM

## 2023-05-16 DIAGNOSIS — E78.5 DYSLIPIDEMIA: ICD-10-CM

## 2023-05-16 DIAGNOSIS — I10 PRIMARY HYPERTENSION: ICD-10-CM

## 2023-05-16 DIAGNOSIS — T78.40XD ALLERGY, SUBSEQUENT ENCOUNTER: ICD-10-CM

## 2023-05-16 DIAGNOSIS — M15.9 PRIMARY OSTEOARTHRITIS INVOLVING MULTIPLE JOINTS: ICD-10-CM

## 2023-05-16 NOTE — ASSESSMENT & PLAN NOTE
Complains of aches and pains in the joints history of hip replacements  Recommend patient take Tylenol as needed for the joint pains avoiding nonsteroidals

## 2023-05-16 NOTE — ASSESSMENT & PLAN NOTE
Patient with osteoporosis could not tolerate the medications and does not want to be on any medications last DEXA scan was done in 2020  Again we discussed some more options including the injections we will arrange for repeat DEXA scan to see where we stand and then decide  Patient agreed to go for DEXA scan

## 2023-05-16 NOTE — PROGRESS NOTES
Office Visit Note  23     Raymundo Black 82 y o  female MRN: [id number]  : 1935    Assessment:     1  Age-related osteoporosis without current pathological fracture  Assessment & Plan:  Patient with osteoporosis could not tolerate the medications and does not want to be on any medications last DEXA scan was done in   Again we discussed some more options including the injections we will arrange for repeat DEXA scan to see where we stand and then decide  Patient agreed to go for DEXA scan  Orders:  -     DXA bone density spine hip and pelvis; Future; Expected date: 2023    2  Allergy, subsequent encounter  Assessment & Plan:  Patient with runny nose Flonase appears to be helping we will continue      3  Dyslipidemia  Assessment & Plan:  Patient is currently taking atorvastatin 20 mg at bedtime we will continue last cholesterol level was 164       4  Primary osteoarthritis involving multiple joints  Assessment & Plan:  Complains of aches and pains in the joints history of hip replacements  Recommend patient take Tylenol as needed for the joint pains avoiding nonsteroidals  5  Primary hypertension  Assessment & Plan:  Blood pressure 126/76 currently taking lisinopril 20 mg daily we will continue  Discussion Summary and Plan: Today's care plan and medications were reviewed with patient in detail and all their questions answered to their satisfaction  Chief Complaint   Patient presents with   • Follow-up     F/U  The patient offers no other complaints  Bev Knott        Subjective:  Patient is coming here for follow-up evaluation regarding hypertension, hypercholesterolemia, DJD and also increased allergy symptoms recently  Patient is currently using Flonase nasal spray  Medications reviewed labs reviewed    Complains of joint pains she is taking aspirin every day      The following portions of the patient's history were reviewed and updated as appropriate: allergies, current medications, past family history, past medical history, past social history, past surgical history and problem list     Review of Systems   Constitutional: Negative for chills and fever  HENT: Negative for ear pain and sore throat  Eyes: Negative for pain and visual disturbance  Respiratory: Negative for cough and shortness of breath  Cardiovascular: Negative for chest pain and palpitations  Gastrointestinal: Negative for abdominal pain and vomiting  Genitourinary: Negative for dysuria and hematuria  Musculoskeletal: Positive for arthralgias  Negative for back pain  Skin: Negative for color change and rash  Neurological: Negative for seizures and syncope  All other systems reviewed and are negative  Historical Information   Patient Active Problem List   Diagnosis   • UTI (urinary tract infection)   • Primary hypertension   • Dyslipidemia   • Age-related osteoporosis without current pathological fracture   • Osteoarthritis of multiple joints   • Allergies     Past Medical History:   Diagnosis Date   • Arthritis    • GERD (gastroesophageal reflux disease)    • Hyperlipidemia    • Hypertension    • Osteoporosis    • Urinary tract infection      Past Surgical History:   Procedure Laterality Date   • CATARACT EXTRACTION     • COLONOSCOPY N/A 3/2/2017    Procedure: COLONOSCOPY;  Surgeon: Esa Swift MD;  Location: [facility] GI LAB; Service:    • JOINT REPLACEMENT Bilateral 2011, 2012    hips   • REFRACTIVE SURGERY Right      Social History     Substance and Sexual Activity   Alcohol Use No    Comment: on holidays     Social History     Substance and Sexual Activity   Drug Use No     Social History     Tobacco Use   Smoking Status Never   Smokeless Tobacco Never     History reviewed  No pertinent family history    Health Maintenance Due   Topic   • Pneumococcal Vaccine: 65+ Years (1 - PCV)   • COVID-19 Vaccine (3 - Booster for Moderna series)      Meds/Allergies       Current "Outpatient Medications:   •  aspirin 81 MG tablet, Take 81 mg by mouth daily, Disp: , Rfl:   •  atorvastatin (LIPITOR) 20 mg tablet, Take 1 tablet (20 mg total) by mouth daily at bedtime, Disp: 90 tablet, Rfl: 1  •  bimatoprost (LUMIGAN) 0 01 % ophthalmic drops, 1 drop daily at bedtime, Disp: , Rfl:   •  calcium carbonate (OS-MACRINA) 600 MG tablet, Take 600 mg by mouth 2 (two) times a day with meals, Disp: , Rfl:   •  Cholecalciferol (Vitamin D) 50 MCG (2000 UT) CAPS, Take by mouth, Disp: , Rfl:   •  fluticasone (FLONASE) 50 mcg/act nasal spray, 1 spray into each nostril 2 (two) times a day, Disp: 54 6 mL, Rfl: 0  •  lisinopril (ZESTRIL) 20 mg tablet, TAKE 1 TABLET BY MOUTH DAILY, Disp: 90 tablet, Rfl: 1  •  multivitamin (THERAGRAN) TABS, Take 1 tablet by mouth daily, Disp: , Rfl:       Objective:    Vitals:   /76 (BP Location: Right arm, Patient Position: Sitting, Cuff Size: Standard)   Pulse 69   Ht 5' 4\" (1 626 m)   Wt 61 9 kg (136 lb 6 4 oz)   LMP  (LMP Unknown)   SpO2 92%   BMI 23 41 kg/m²   Body mass index is 23 41 kg/m²  Vitals:    05/16/23 1004   Weight: 61 9 kg (136 lb 6 4 oz)       Physical Exam  Vitals and nursing note reviewed  Constitutional:       Appearance: Normal appearance  Cardiovascular:      Rate and Rhythm: Normal rate and regular rhythm  Heart sounds: Normal heart sounds  Pulmonary:      Effort: Pulmonary effort is normal       Breath sounds: Normal breath sounds  Abdominal:      General: Abdomen is flat  Palpations: Abdomen is soft  Musculoskeletal:      Cervical back: Normal range of motion and neck supple  Right lower leg: No edema  Left lower leg: No edema  Comments: Movements of the hip joints and knee joints causing discomfort and pain   Skin:     General: Skin is warm and dry  Neurological:      Mental Status: She is alert and oriented to person, place, and time  Lab Review   No visits with results within 2 Month(s) from this visit   " Latest known visit with results is:   Appointment on 03/06/2023   Component Date Value Ref Range Status   • WBC 03/06/2023 5 01  4 31 - 10 16 Thousand/uL Final   • RBC 03/06/2023 4 11  3 81 - 5 12 Million/uL Final   • Hemoglobin 03/06/2023 12 6  11 5 - 15 4 g/dL Final   • Hematocrit 03/06/2023 40 0  34 8 - 46 1 % Final   • MCV 03/06/2023 97  82 - 98 fL Final   • MCH 03/06/2023 30 7  26 8 - 34 3 pg Final   • MCHC 03/06/2023 31 5  31 4 - 37 4 g/dL Final   • RDW 03/06/2023 13 0  11 6 - 15 1 % Final   • MPV 03/06/2023 9 7  8 9 - 12 7 fL Final   • Platelets 88/07/1876 314  149 - 390 Thousands/uL Final   • nRBC 03/06/2023 0  /100 WBCs Final   • Neutrophils Relative 03/06/2023 54  43 - 75 % Final   • Immat GRANS % 03/06/2023 0  0 - 2 % Final   • Lymphocytes Relative 03/06/2023 32  14 - 44 % Final   • Monocytes Relative 03/06/2023 11  4 - 12 % Final   • Eosinophils Relative 03/06/2023 2  0 - 6 % Final   • Basophils Relative 03/06/2023 1  0 - 1 % Final   • Neutrophils Absolute 03/06/2023 2 72  1 85 - 7 62 Thousands/µL Final   • Immature Grans Absolute 03/06/2023 0 01  0 00 - 0 20 Thousand/uL Final   • Lymphocytes Absolute 03/06/2023 1 62  0 60 - 4 47 Thousands/µL Final   • Monocytes Absolute 03/06/2023 0 54  0 17 - 1 22 Thousand/µL Final   • Eosinophils Absolute 03/06/2023 0 08  0 00 - 0 61 Thousand/µL Final   • Basophils Absolute 03/06/2023 0 04  0 00 - 0 10 Thousands/µL Final   • Sodium 03/06/2023 138  135 - 147 mmol/L Final   • Potassium 03/06/2023 4 0  3 5 - 5 3 mmol/L Final   • Chloride 03/06/2023 108  96 - 108 mmol/L Final   • CO2 03/06/2023 25  21 - 32 mmol/L Final   • ANION GAP 03/06/2023 5  4 - 13 mmol/L Final   • BUN 03/06/2023 13  5 - 25 mg/dL Final   • Creatinine 03/06/2023 0 82  0 60 - 1 30 mg/dL Final    Standardized to IDMS reference method   • Glucose, Fasting 03/06/2023 84  65 - 99 mg/dL Final    Specimen collection should occur prior to Sulfasalazine administration due to the potential for falsely depressed results  Specimen collection should occur prior to Sulfapyridine administration due to the potential for falsely elevated results  • Calcium 03/06/2023 9 5  8 3 - 10 1 mg/dL Final   • AST 03/06/2023 16  5 - 45 U/L Final    Specimen collection should occur prior to Sulfasalazine administration due to the potential for falsely depressed results  • ALT 03/06/2023 19  12 - 78 U/L Final    Specimen collection should occur prior to Sulfasalazine and/or Sulfapyridine administration due to the potential for falsely depressed results  • Alkaline Phosphatase 03/06/2023 59  46 - 116 U/L Final   • Total Protein 03/06/2023 7 2  6 4 - 8 4 g/dL Final   • Albumin 03/06/2023 4 1  3 5 - 5 0 g/dL Final   • Total Bilirubin 03/06/2023 0 55  0 20 - 1 00 mg/dL Final    Use of this assay is not recommended for patients undergoing treatment with eltrombopag due to the potential for falsely elevated results  • eGFR 03/06/2023 65  ml/min/1 73sq m Final   • Hemoglobin A1C 03/06/2023 5 5  Normal 3 8-5 6%; PreDiabetic 5 7-6 4%; Diabetic >=6 5%; Glycemic control for adults with diabetes <7 0% % Final   • EAG 03/06/2023 111  mg/dl Final   • Cholesterol 03/06/2023 164  See Comment mg/dL Final    Cholesterol:         Pediatric <18 Years        Desirable          <170 mg/dL      Borderline High    170-199 mg/dL      High               >=200 mg/dL        Adult >=18 Years            Desirable         <200 mg/dL      Borderline High   200-239 mg/dL      High              >239 mg/dL     • Triglycerides 03/06/2023 187 (H)  See Comment mg/dL Final    Triglyceride:     0-9Y            <75mg/dL     10Y-17Y         <90 mg/dL       >=18Y     Normal          <150 mg/dL     Borderline High 150-199 mg/dL     High            200-499 mg/dL        Very High       >499 mg/dL    Specimen collection should occur prior to N-Acetylcysteine or Metamizole administration due to the potential for falsely depressed results     • HDL, Direct 03/06/2023 36 (L)  >=50 mg/dL Final    Specimen collection should occur prior to Metamizole administration due to the potential for falsley depressed results  • LDL Calculated 03/06/2023 91  0 - 100 mg/dL Final    LDL Cholesterol:     Optimal           <100 mg/dl     Near Optimal      100-129 mg/dl     Above Optimal       Borderline High 130-159 mg/dl       High            160-189 mg/dl       Very High       >189 mg/dl         This screening LDL is a calculated result  It does not have the accuracy of the Direct Measured LDL in the monitoring of patients with hyperlipidemia and/or statin therapy  Direct Measure LDL (YVY609) must be ordered separately in these patients  • Non-HDL-Chol (CHOL-HDL) 03/06/2023 128  mg/dl Final   • TSH 3RD GENERATON 03/06/2023 1 570  0 450 - 4 500 uIU/mL Final    The recommended reference ranges for TSH during pregnancy are as follows:   First trimester 0 1 to 2 5 uIU/mL   Second trimester  0 2 to 3 0 uIU/mL   Third trimester 0 3 to 3 0 uIU/m    Note: Normal ranges may not apply to patients who are transgender, non-binary, or whose legal sex, sex at birth, and gender identity differ  Adult TSH (3rd generation) reference range follows the recommended guidelines of the American Thyroid Association, January, 2020     • Color, UA 03/06/2023 Colorless   Final   • Clarity, UA 03/06/2023 Clear   Final   • Specific Gravity, UA 03/06/2023 1 008  1 003 - 1 030 Final   • pH, UA 03/06/2023 7 5  4 5, 5 0, 5 5, 6 0, 6 5, 7 0, 7 5, 8 0 Final   • Leukocytes, UA 03/06/2023 Small (A)  Negative Final   • Nitrite, UA 03/06/2023 Negative  Negative Final   • Protein, UA 03/06/2023 Negative  Negative mg/dl Final   • Glucose, UA 03/06/2023 Negative  Negative mg/dl Final   • Ketones, UA 03/06/2023 Negative  Negative mg/dl Final   • Urobilinogen, UA 03/06/2023 <2 0  <2 0 mg/dl mg/dl Final   • Bilirubin, UA 03/06/2023 Negative  Negative Final   • Occult Blood, UA 03/06/2023 Negative  Negative Final   • RBC, UA 03/06/2023 "1-2  None Seen, 1-2 /hpf Final   • WBC, UA 03/06/2023 20-30 (A)  None Seen, 1-2 /hpf Final   • Epithelial Cells 03/06/2023 Occasional  None Seen, Occasional /hpf Final   • Bacteria, UA 03/06/2023 None Seen  None Seen, Occasional /hpf Final   • Urine Culture 03/06/2023 >100,000 cfu/ml Escherichia coli (A)   Final    This organism has been edited  The previous result was Gram Negative Lopez Enteric Like on 3/7/2023 at 1811 EST  Karen Bruno MD        \"This note has been constructed using a voice recognition system  Therefore there may be syntax, spelling, and/or grammatical errors  Please call if you have any questions   \"  "

## 2023-05-16 NOTE — ASSESSMENT & PLAN NOTE
Patient is currently taking atorvastatin 20 mg at bedtime we will continue last cholesterol level was 164

## 2023-06-01 DIAGNOSIS — T78.40XD ALLERGY, SUBSEQUENT ENCOUNTER: ICD-10-CM

## 2023-06-01 RX ORDER — FLUTICASONE PROPIONATE 50 MCG
1 SPRAY, SUSPENSION (ML) NASAL 2 TIMES DAILY
Qty: 54.6 ML | Refills: 0 | Status: SHIPPED | OUTPATIENT
Start: 2023-06-01

## 2023-06-12 ENCOUNTER — IOP CHECK (OUTPATIENT)
Dept: URBAN - METROPOLITAN AREA CLINIC 6 | Facility: CLINIC | Age: 88
End: 2023-06-12

## 2023-06-12 DIAGNOSIS — H40.1112: ICD-10-CM

## 2023-06-12 DIAGNOSIS — H44.512: ICD-10-CM

## 2023-06-12 PROCEDURE — 92020 GONIOSCOPY: CPT

## 2023-06-12 PROCEDURE — 92012 INTRM OPH EXAM EST PATIENT: CPT

## 2023-06-12 PROCEDURE — 92083 EXTENDED VISUAL FIELD XM: CPT

## 2023-06-12 ASSESSMENT — VISUAL ACUITY: OD_CC: 20/25+2

## 2023-06-12 ASSESSMENT — TONOMETRY
OD_IOP_MMHG: 20
OS_IOP_MMHG: 17

## 2023-07-13 ENCOUNTER — RA CDI HCC (OUTPATIENT)
Dept: OTHER | Facility: HOSPITAL | Age: 88
End: 2023-07-13

## 2023-07-13 NOTE — PROGRESS NOTES
[facility] [address] [facility] coding opportunities       Chart reviewed, no opportunity found: CHART REVIEWED, NO OPPORTUNITY FOUND        Patients Insurance     Medicare Insurance: Medicare

## 2023-07-20 ENCOUNTER — OFFICE VISIT (OUTPATIENT)
Dept: FAMILY MEDICINE CLINIC | Facility: CLINIC | Age: 88
End: 2023-07-20
Payer: MEDICARE

## 2023-07-20 VITALS
HEART RATE: 83 BPM | HEIGHT: 64 IN | OXYGEN SATURATION: 96 % | SYSTOLIC BLOOD PRESSURE: 128 MMHG | BODY MASS INDEX: 22.47 KG/M2 | WEIGHT: 131.6 LBS | DIASTOLIC BLOOD PRESSURE: 82 MMHG

## 2023-07-20 DIAGNOSIS — T78.40XD ALLERGY, SUBSEQUENT ENCOUNTER: ICD-10-CM

## 2023-07-20 DIAGNOSIS — M15.9 PRIMARY OSTEOARTHRITIS INVOLVING MULTIPLE JOINTS: ICD-10-CM

## 2023-07-20 DIAGNOSIS — I10 PRIMARY HYPERTENSION: Primary | ICD-10-CM

## 2023-07-20 DIAGNOSIS — M81.0 AGE-RELATED OSTEOPOROSIS WITHOUT CURRENT PATHOLOGICAL FRACTURE: ICD-10-CM

## 2023-07-20 DIAGNOSIS — E78.5 DYSLIPIDEMIA: ICD-10-CM

## 2023-07-20 PROCEDURE — 99213 OFFICE O/P EST LOW 20 MIN: CPT | Performed by: INTERNAL MEDICINE

## 2023-07-20 NOTE — PROGRESS NOTES
Name: Radha Otero      : 1935      MRN: [id number]  Encounter Provider: Tristen Clemente MD  Encounter Date: 2023   Encounter department: [address]     1. Primary hypertension  Assessment & Plan:  Blood pressure stable at 128/82 continue lisinopril 20 mg daily      2. Primary osteoarthritis involving multiple joints  Assessment & Plan:  Patient with bilateral hip replacement complains of aches and pains in the other joints in the knee joints recommend just to take Tylenol on a as needed basis at this time. Avoid nonsteroidals      3. Dyslipidemia  Assessment & Plan:  Continue atorvastatin 20 mg at bedtime      4. Allergy, subsequent encounter  Assessment & Plan:  Patient does have a lot of allergies and takes Flonase nasal spray appears to be helping we will continue      5. Age-related osteoporosis without current pathological fracture  Assessment & Plan:  Patient could not tolerate medications for osteoporosis refusing to have injections. Currently she is taking calcium and vitamin D tablets. Scheduled for DEXA scan in October we will follow it up. Subjective     Patient has coming here for a follow-up evaluation with regards to her symptoms of hypertension, dyslipidemia, DJD she denies any symptoms of chest pains palpitation shortness of breath she is legally blind. Complains of aches and pains in the joints. Medications reviewed labs reviewed    Review of Systems   Constitutional: Negative for chills, fatigue and fever. HENT: Negative for ear pain, sore throat and trouble swallowing. Eyes: Negative for pain and visual disturbance. Respiratory: Negative for cough and shortness of breath. Cardiovascular: Negative for chest pain and palpitations. Gastrointestinal: Negative for abdominal pain, constipation, diarrhea and vomiting. Genitourinary: Negative for difficulty urinating, dysuria, flank pain and hematuria. Musculoskeletal: Negative for arthralgias, back pain and myalgias. Skin: Negative for rash and wound. Neurological: Negative for dizziness, seizures, syncope and headaches. Psychiatric/Behavioral: Negative for confusion and sleep disturbance. The patient is not nervous/anxious. All other systems reviewed and are negative. Past Medical History:   Diagnosis Date   • Arthritis    • GERD (gastroesophageal reflux disease)    • Hyperlipidemia    • Hypertension    • Osteoporosis    • Urinary tract infection      Past Surgical History:   Procedure Laterality Date   • CATARACT EXTRACTION     • COLONOSCOPY N/A 3/2/2017    Procedure: COLONOSCOPY;  Surgeon: Sekou Bustos MD;  Location: [address] [place] [address] [address] GI LAB; Service:    • JOINT REPLACEMENT Bilateral 2011, 2012    hips   • REFRACTIVE SURGERY Right      History reviewed. No pertinent family history. Social History     Socioeconomic History   • Marital status: Single     Spouse name: None   • Number of children: None   • Years of education: None   • Highest education level: None   Occupational History   • None   Tobacco Use   • Smoking status: Never   • Smokeless tobacco: Never   Substance and Sexual Activity   • Alcohol use: No     Comment: on holidays   • Drug use: No   • Sexual activity: Never   Other Topics Concern   • None   Social History Narrative   • None     Social Determinants of Health     Financial Resource Strain: Low Risk  (3/15/2023)    Overall Financial Resource Strain (CARDIA)    • Difficulty of Paying Living Expenses: Not very hard   Food Insecurity: Not on file   Transportation Needs: No Transportation Needs (3/15/2023)    PRAPARE - Transportation    • Lack of Transportation (Medical): No    • Lack of Transportation (Non-Medical):  No   Physical Activity: Not on file   Stress: Not on file   Social Connections: Not on file   Intimate Partner Violence: Not on file   Housing Stability: Not on file     Current Outpatient Medications on File Prior to Visit Medication Sig   • aspirin 81 MG tablet Take 81 mg by mouth daily   • atorvastatin (LIPITOR) 20 mg tablet Take 1 tablet (20 mg total) by mouth daily at bedtime   • bimatoprost (LUMIGAN) 0.01 % ophthalmic drops 1 drop daily at bedtime   • calcium carbonate (OS-MACRINA) 600 MG tablet Take 600 mg by mouth 2 (two) times a day with meals   • Cholecalciferol (Vitamin D) 50 MCG (2000 UT) CAPS Take by mouth   • fluticasone (FLONASE) 50 mcg/act nasal spray 1 spray into each nostril 2 (two) times a day   • lisinopril (ZESTRIL) 20 mg tablet TAKE 1 TABLET BY MOUTH DAILY   • multivitamin (THERAGRAN) TABS Take 1 tablet by mouth daily     No Known Allergies  Immunization History   Administered Date(s) Administered   • COVID-19 MODERNA VACC 0.5 ML IM 02/25/2021, 03/25/2021   • Influenza, high dose seasonal 0.7 mL 11/03/2022       Objective     /82   Pulse 83   Ht 5' 4" (1.626 m)   Wt 59.7 kg (131 lb 9.6 oz)   LMP  (LMP Unknown)   SpO2 96%   BMI 22.59 kg/m²     Physical Exam  Vitals and nursing note reviewed. Constitutional:       Appearance: Normal appearance. She is normal weight. She is not ill-appearing. HENT:      Head: Normocephalic. Nose: Nose normal. No congestion or rhinorrhea. Mouth/Throat:      Pharynx: Oropharynx is clear. No posterior oropharyngeal erythema. Cardiovascular:      Rate and Rhythm: Normal rate and regular rhythm. Heart sounds: Normal heart sounds. Pulmonary:      Effort: Pulmonary effort is normal. No respiratory distress. Breath sounds: Normal breath sounds. Abdominal:      General: Abdomen is flat. Bowel sounds are normal. There is no distension. Palpations: Abdomen is soft. Tenderness: There is no abdominal tenderness. Musculoskeletal:      Cervical back: Normal range of motion and neck supple. Right lower leg: No edema. Left lower leg: No edema. Skin:     General: Skin is warm and dry. Coloration: Skin is not jaundiced or pale. Findings: No rash. Neurological:      General: No focal deficit present. Mental Status: She is alert and oriented to person, place, and time. Motor: No weakness. Coordination: Coordination normal.      Gait: Gait normal.   Psychiatric:         Mood and Affect: Mood normal.         Behavior: Behavior normal.         Thought Content:  Thought content normal.         Judgment: Judgment normal.       Sarah Vazquez MD

## 2023-07-20 NOTE — ASSESSMENT & PLAN NOTE
Patient does have a lot of allergies and takes Flonase nasal spray appears to be helping we will continue

## 2023-07-20 NOTE — ASSESSMENT & PLAN NOTE
Patient with bilateral hip replacement complains of aches and pains in the other joints in the knee joints recommend just to take Tylenol on a as needed basis at this time.   Avoid nonsteroidals

## 2023-07-20 NOTE — ASSESSMENT & PLAN NOTE
Patient could not tolerate medications for osteoporosis refusing to have injections. Currently she is taking calcium and vitamin D tablets. Scheduled for DEXA scan in October we will follow it up.

## 2023-09-20 ENCOUNTER — TELEPHONE (OUTPATIENT)
Age: 88
End: 2023-09-20

## 2023-09-20 ENCOUNTER — APPOINTMENT (OUTPATIENT)
Dept: RADIOLOGY | Facility: CLINIC | Age: 88
End: 2023-09-20
Payer: MEDICARE

## 2023-09-20 DIAGNOSIS — M15.9 PRIMARY OSTEOARTHRITIS INVOLVING MULTIPLE JOINTS: ICD-10-CM

## 2023-09-20 DIAGNOSIS — Z96.643 HISTORY OF BILATERAL HIP REPLACEMENTS: ICD-10-CM

## 2023-09-20 DIAGNOSIS — Z96.643 HISTORY OF BILATERAL HIP REPLACEMENTS: Primary | ICD-10-CM

## 2023-09-20 PROCEDURE — 73521 X-RAY EXAM HIPS BI 2 VIEWS: CPT

## 2023-09-20 NOTE — TELEPHONE ENCOUNTER
Patient called and  is having a lot of pain in both hips and radiating into both legs.  was cutting branches on Saturday and Sunday started with pain. Patient denies falling. Would like to know if she can have order for XR.  had hip replacements 2012 and 2013.  Patient would like a call back when ordered, [phone]

## 2023-09-21 ENCOUNTER — RA CDI HCC (OUTPATIENT)
Dept: OTHER | Facility: HOSPITAL | Age: 88
End: 2023-09-21

## 2023-09-22 ENCOUNTER — TELEPHONE (OUTPATIENT)
Age: 88
End: 2023-09-22

## 2023-09-22 ENCOUNTER — HOSPITAL ENCOUNTER (EMERGENCY)
Facility: HOSPITAL | Age: 88
Discharge: HOME | End: 2023-09-22
Attending: EMERGENCY MEDICINE
Payer: MEDICARE

## 2023-09-22 ENCOUNTER — APPOINTMENT (EMERGENCY)
Dept: RADIOLOGY | Facility: HOSPITAL | Age: 88
End: 2023-09-22
Payer: MEDICARE

## 2023-09-22 VITALS
RESPIRATION RATE: 20 BRPM | DIASTOLIC BLOOD PRESSURE: 88 MMHG | HEART RATE: 92 BPM | TEMPERATURE: 98.2 F | OXYGEN SATURATION: 98 % | SYSTOLIC BLOOD PRESSURE: 132 MMHG

## 2023-09-22 DIAGNOSIS — S32.10XA SACRAL FRACTURE (HCC): Primary | ICD-10-CM

## 2023-09-22 PROCEDURE — 72131 CT LUMBAR SPINE W/O DYE: CPT

## 2023-09-22 PROCEDURE — G1004 CDSM NDSC: HCPCS

## 2023-09-22 PROCEDURE — 99283 EMERGENCY DEPT VISIT LOW MDM: CPT

## 2023-09-22 PROCEDURE — 72192 CT PELVIS W/O DYE: CPT

## 2023-09-22 RX ORDER — ACETAMINOPHEN 325 MG/1
650 TABLET ORAL ONCE
Status: COMPLETED | OUTPATIENT
Start: 2023-09-22 | End: 2023-09-22

## 2023-09-22 RX ORDER — PREDNISONE 20 MG/1
60 TABLET ORAL ONCE
Status: COMPLETED | OUTPATIENT
Start: 2023-09-22 | End: 2023-09-22

## 2023-09-22 RX ORDER — LIDOCAINE 50 MG/G
1 PATCH TOPICAL ONCE
Status: DISCONTINUED | OUTPATIENT
Start: 2023-09-22 | End: 2023-09-22 | Stop reason: HOSPADM

## 2023-09-22 RX ORDER — PREDNISONE 20 MG/1
40 TABLET ORAL DAILY
Qty: 8 TABLET | Refills: 0 | Status: SHIPPED | OUTPATIENT
Start: 2023-09-22 | End: 2023-09-26

## 2023-09-22 RX ADMIN — ACETAMINOPHEN 650 MG: 325 TABLET ORAL at 17:18

## 2023-09-22 RX ADMIN — PREDNISONE 60 MG: 20 TABLET ORAL at 17:18

## 2023-09-22 RX ADMIN — LIDOCAINE 1 PATCH: 50 PATCH CUTANEOUS at 17:19

## 2023-09-22 NOTE — TELEPHONE ENCOUNTER
Sho Hernandez, calling for [employer]. He stated she is pain with her hips and can hardly walk. They are waiting for xray results (still pending). Not sure we can share info with her neighbor, maybe we can call Pattie and get some more information. He was not with her at the time of call.   Thank you    Viri Sensing number is [phone]

## 2023-09-22 NOTE — ED PROVIDER NOTES
History  Chief Complaint   Patient presents with   • Hip Pain     States a week ago was pulling weeds and started having pain in lower back and both hips. Denies fall. States two days ago had xrays at urgent care but they wont be read yet     82-year-old female presenting today for evaluation of bilateral sacral pain that began about 4 days ago, pain worsened after she was weeding. Has not had any falls. Has been taking Tylenol which takes the edge off however then returns. Taking this every 8 hours. Pain is noted in the bilateral buttock region she chronically has lower back pain however states that when the sacral/buttock pain began she no longer had back pain. Patient later admits that about 7 to 10 days ago she was on her riding mower when she accidentally ran into a rock, states that she did not fall off the lawnmower and was able to get off afterwards and ambulate without difficulty. Relays was going at a slow speed, however, the  needed $300-$400 of repairs. Gradually had worsening soreness. Had x-rays done at urgent care however has not gotten results. Patient has history of bilateral hip replacements. No groin pain. States that her right ankle has been somewhat sore. Has no pain with rest worsens with ambulation and movement. Denies numbness, paresthesias, saddle anesthesias, bladder or bowel retention or incontinence, constipation. Prior to Admission Medications   Prescriptions Last Dose Informant Patient Reported? Taking?    Cholecalciferol (Vitamin D) 50 MCG (2000) CAPS   Yes No   Sig: Take by mouth   aspirin 81 MG tablet   Yes No   Sig: Take 81 mg by mouth daily   atorvastatin (LIPITOR) 20 mg tablet   No No   Sig: Take 1 tablet (20 mg total) by mouth daily at bedtime   bimatoprost (LUMIGAN) 0.01 % ophthalmic drops   Yes No   Si drop daily at bedtime   calcium carbonate (OS-MACRINA) 600 MG tablet   Yes No   Sig: Take 600 mg by mouth 2 (two) times a day with meals fluticasone (FLONASE) 50 mcg/act nasal spray   No No   Si spray into each nostril 2 (two) times a day   lisinopril (ZESTRIL) 20 mg tablet   No No   Sig: TAKE 1 TABLET BY MOUTH DAILY   multivitamin (THERAGRAN) TABS   Yes No   Sig: Take 1 tablet by mouth daily      Facility-Administered Medications: None       Past Medical History:   Diagnosis Date   • Arthritis    • GERD (gastroesophageal reflux disease)    • Hyperlipidemia    • Hypertension    • Osteoporosis    • Urinary tract infection        Past Surgical History:   Procedure Laterality Date   • CATARACT EXTRACTION     • COLONOSCOPY N/A 3/2/2017    Procedure: COLONOSCOPY;  Surgeon: Aniya Hackett MD;  Location: [address] [place] [address] [address] GI LAB; Service:    • JOINT REPLACEMENT Bilateral ,     hips   • REFRACTIVE SURGERY Right        History reviewed. No pertinent family history. I have reviewed and agree with the history as documented. E-Cigarette/Vaping   • E-Cigarette Use Never User      E-Cigarette/Vaping Substances     Social History     Tobacco Use   • Smoking status: Never   • Smokeless tobacco: Never   Vaping Use   • Vaping Use: Never used   Substance Use Topics   • Alcohol use: No     Comment: on holidays   • Drug use: No       Review of Systems   Constitutional: Negative. Negative for chills, fever and unexpected weight change. Denies IV drug use     HENT: Negative. Eyes: Negative. Respiratory: Negative. Negative for cough, chest tightness, shortness of breath and wheezing. Cardiovascular: Negative. Negative for chest pain and palpitations. Gastrointestinal: Negative. Negative for abdominal pain, constipation, diarrhea, nausea and vomiting. Genitourinary: Negative. Negative for difficulty urinating, dysuria, flank pain, frequency, hematuria and urgency. Denies numbness, tingling in the groin. Musculoskeletal: Positive for arthralgias and back pain.  Negative for gait problem, joint swelling, myalgias, neck pain and neck stiffness. Skin: Negative. Negative for color change. Neurological: Negative. Negative for dizziness, tremors, weakness, light-headedness, numbness and headaches. All other systems reviewed and are negative. Physical Exam  Physical Exam  Vitals and nursing note reviewed. Constitutional:       General: She is not in acute distress. Appearance: Normal appearance. She is well-developed and normal weight. She is not diaphoretic. HENT:      Head: Normocephalic and atraumatic. Right Ear: External ear normal.      Left Ear: External ear normal.      Nose: Nose normal.      Mouth/Throat:      Pharynx: No oropharyngeal exudate. Eyes:      General: No scleral icterus. Right eye: No discharge. Left eye: No discharge. Conjunctiva/sclera: Conjunctivae normal.      Pupils: Pupils are equal, round, and reactive to light. Cardiovascular:      Rate and Rhythm: Normal rate and regular rhythm. Pulses: Normal pulses. Heart sounds: Normal heart sounds. No murmur heard. No friction rub. No gallop. Pulmonary:      Effort: Pulmonary effort is normal. No respiratory distress. Breath sounds: Normal breath sounds. No wheezing or rales. Chest:      Chest wall: No tenderness. Abdominal:      General: Abdomen is flat. Bowel sounds are normal. There is no distension. Palpations: Abdomen is soft. There is no mass. Tenderness: There is no abdominal tenderness. There is no right CVA tenderness, left CVA tenderness, guarding or rebound. Hernia: No hernia is present. Musculoskeletal:        Arms:       Cervical back: Normal range of motion and neck supple. Lymphadenopathy:      Cervical: No cervical adenopathy. Skin:     General: Skin is warm and dry. Capillary Refill: Capillary refill takes less than 2 seconds. Coloration: Skin is not pale. Findings: No erythema or rash. Neurological:      General: No focal deficit present.       Mental Status: She is alert and oriented to person, place, and time. Mental status is at baseline. Vital Signs  ED Triage Vitals [09/22/23 1503]   Temperature Pulse Respirations Blood Pressure SpO2   98.2 °F (36.8 °C) 92 20 132/88 98 %      Temp Source Heart Rate Source Patient Position - Orthostatic VS BP Location FiO2 (%)   Tympanic Radial Sitting Left arm --      Pain Score       10 - Worst Possible Pain           Vitals:    09/22/23 1503   BP: 132/88   Pulse: 92   Patient Position - Orthostatic VS: Sitting         Visual Acuity      ED Medications  Medications   lidocaine (LIDODERM) 5 % patch 1 patch (1 patch Topical Medication Applied 9/22/23 1719)   acetaminophen (TYLENOL) tablet 650 mg (650 mg Oral Given 9/22/23 1718)   predniSONE tablet 60 mg (60 mg Oral Given 9/22/23 1718)       Diagnostic Studies  Results Reviewed     None                 CT lumbar spine without contrast   Final Result by Melyssa Rader MD (09/22 1720)      Acute nondisplaced sacral fracture. Vertebral body heights are grossly maintained with diffuse osteopenia noted. Workstation performed: [id number]         CT pelvis wo contrast   Final Result by Melyssa Rader MD (09/22 1719)      Acute nondisplaced sacral fracture. Status post bilateral hip arthroplasty without evidence for hardware complication. Workstation performed: [id number]                    Procedures  Procedures         ED Course                                             Medical Decision Making  Reviewed x-rays with the patient. Patient later admitted to lawnmower accident. She did not sustain any distinct injuries from the accident however chronic pain did seem to worsen afterwards. Patient is agreeable to CT imaging at this point time as a precaution and given continued pain. Accident was approx 10 days ago and pain seems to become more severe 4 days ago.  Discussed with patient she may take tylenol more frequently, advised patches such as lidocaine, no h/o GI bleeds and on no thinners, recent GFR in March appeared well. May have patient take low dose ibuprofen sparingly for severe pain. She lives by herself however here with neighbors and has a good support system, she is ambulatory in the ED, uses a walker at home. Patient feels comfortable being discharged. I discussed with her and her neighbors who are nurses the sacral fracture noted on imaging. Patient has an upcoming appointment with her PCP. Patient is informed to return to the emergency department for worsening of symptoms and was given proper education regarding their diagnosis and symptoms. Otherwise the patient is informed to follow up with their primary care doctor for re-evaluation. The patient verbalizes understanding and agrees with above assessment and plan. All questions were answered. Amount and/or Complexity of Data Reviewed  Radiology: ordered. Risk  OTC drugs. Prescription drug management. Disposition  Final diagnoses:   Sacral fracture ([address])     Time reflects when diagnosis was documented in both MDM as applicable and the Disposition within this note     Time User Action Codes Description Comment    9/22/2023  5:28 PM Boris Morgan Sacral fracture [facility])       ED Disposition     ED Disposition   Discharge    Condition   Stable    Date/Time   Fri Sep 22, 2023  5:28 PM    Comment   Wali Manning discharge to home/self care.                Follow-up Information     Follow up With Specialties Details Why Contact Info Additional [address] Emergency Department Emergency Medicine Go to  If symptoms worsen, otherwise please follow up with your family doctor [address] [id number] [facility] Emergency Department, [address] , [facility] [place], [address]          Patient's Medications   Discharge Prescriptions    PREDNISONE 20 MG TABLET    Take 2 tablets (40 mg total) by mouth daily for 4 days       Start Date: 9/22/2023 End Date: 9/26/2023       Order Dose: 40 mg       Quantity: 8 tablet    Refills: 0       No discharge procedures on file.     PDMP Review     None          ED Provider  Electronically Signed by           Chauncey Medellin PA-C  09/22/23 8742

## 2023-09-22 NOTE — TELEPHONE ENCOUNTER
Patient called in to follow up on X-ray results taken 9/20. Results are not finalized. Please follow up with patient.

## 2023-09-27 DIAGNOSIS — T78.40XD ALLERGY, SUBSEQUENT ENCOUNTER: ICD-10-CM

## 2023-09-27 RX ORDER — FLUTICASONE PROPIONATE 50 MCG
1 SPRAY, SUSPENSION (ML) NASAL 2 TIMES DAILY
Qty: 54.6 ML | Refills: 1 | Status: SHIPPED | OUTPATIENT
Start: 2023-09-27 | End: 2024-04-24 | Stop reason: SDUPTHER

## 2023-10-03 ENCOUNTER — HOSPITAL ENCOUNTER (OUTPATIENT)
Dept: RADIOLOGY | Facility: HOSPITAL | Age: 88
Discharge: HOME | End: 2023-10-03
Attending: INTERNAL MEDICINE
Payer: MEDICARE

## 2023-10-03 VITALS — BODY MASS INDEX: 22.36 KG/M2 | WEIGHT: 131 LBS | HEIGHT: 64 IN

## 2023-10-03 DIAGNOSIS — M81.0 AGE-RELATED OSTEOPOROSIS WITHOUT CURRENT PATHOLOGICAL FRACTURE: ICD-10-CM

## 2023-10-03 PROCEDURE — 77080 DXA BONE DENSITY AXIAL: CPT

## 2023-10-16 ENCOUNTER — OFFICE VISIT (OUTPATIENT)
Dept: FAMILY MEDICINE CLINIC | Facility: CLINIC | Age: 88
End: 2023-10-16
Payer: MEDICARE

## 2023-10-16 VITALS
WEIGHT: 135 LBS | SYSTOLIC BLOOD PRESSURE: 128 MMHG | HEIGHT: 64 IN | BODY MASS INDEX: 23.05 KG/M2 | HEART RATE: 103 BPM | DIASTOLIC BLOOD PRESSURE: 76 MMHG | OXYGEN SATURATION: 97 %

## 2023-10-16 DIAGNOSIS — I10 PRIMARY HYPERTENSION: ICD-10-CM

## 2023-10-16 DIAGNOSIS — E78.5 DYSLIPIDEMIA: ICD-10-CM

## 2023-10-16 DIAGNOSIS — Z23 ENCOUNTER FOR IMMUNIZATION: ICD-10-CM

## 2023-10-16 DIAGNOSIS — M81.0 AGE-RELATED OSTEOPOROSIS WITHOUT CURRENT PATHOLOGICAL FRACTURE: Primary | ICD-10-CM

## 2023-10-16 DIAGNOSIS — T78.40XD ALLERGY, SUBSEQUENT ENCOUNTER: ICD-10-CM

## 2023-10-16 PROCEDURE — 99214 OFFICE O/P EST MOD 30 MIN: CPT | Performed by: INTERNAL MEDICINE

## 2023-10-16 PROCEDURE — G0008 ADMIN INFLUENZA VIRUS VAC: HCPCS

## 2023-10-16 PROCEDURE — 90662 IIV NO PRSV INCREASED AG IM: CPT

## 2023-10-16 RX ORDER — LISINOPRIL 20 MG/1
20 TABLET ORAL DAILY
Qty: 90 TABLET | Refills: 1 | Status: SHIPPED | OUTPATIENT
Start: 2023-10-16

## 2023-10-16 RX ORDER — ATORVASTATIN CALCIUM 20 MG/1
20 TABLET, FILM COATED ORAL
Qty: 90 TABLET | Refills: 1 | Status: SHIPPED | OUTPATIENT
Start: 2023-10-16

## 2023-10-16 NOTE — ASSESSMENT & PLAN NOTE
Patient is on atorvastatin 20 mg we will follow-up with repeat lipid profile and check the liver enzymes

## 2023-10-16 NOTE — ASSESSMENT & PLAN NOTE
Patient with bilateral hip replacement complains of aches and pains in the joints and has been taking Tylenol as needed sometimes a lot we will get liver function test drawn recommend to cut back on the Tylenol intake heating pad as needed

## 2023-10-16 NOTE — PROGRESS NOTES
Office Visit Note  10/16/23     Urmila Munoz 82 y.o. female MRN: [id number]  : 1935    Assessment:     1. Age-related osteoporosis without current pathological fracture  Assessment & Plan:  Patient with osteoporosis did not want to go on any medications in the past now she is agreeable for the injection Prolia every 6 months we will arrange for the calcium level to be drawn and then order the Prolia injection. Recent DEXA scan report reviewed worsening of her osteoporosis. 2. Dyslipidemia  Assessment & Plan:  Patient is on atorvastatin 20 mg we will follow-up with repeat lipid profile and check the liver enzymes      3. Primary hypertension  Assessment & Plan:  Blood pressure is 128/76 we will continue lisinopril 20 mg daily      4. Encounter for immunization  -     influenza vaccine, high-dose, PF 0.7 mL (FLUZONE HIGH-DOSE)    5. Allergy, subsequent encounter  Assessment & Plan:  Patient with allergies on Flonase nasal spray which appears to be helping we will continue                 Discussion Summary and Plan: Today's care plan and medications were reviewed with patient in detail and all their questions answered to their satisfaction. No chief complaint on file. Subjective:  Patient is coming here for a follow-up evaluation with regards to symptoms of hypertension, osteoporosis, DJD. Patient has been taking Tylenol almost it appear has been taking a lot of Tylenol sometimes up to 3000 mg/day. Status post bilateral hip replacement. History of osteoporosis could not tolerate any other medications orally. Recent DEXA scan showed worsening of the osteoporosis. Discussed with the patient regarding Prolia injections also now she is agreeable for that.         The following portions of the patient's history were reviewed and updated as appropriate: allergies, current medications, past family history, past medical history, past social history, past surgical history and problem list.    Review of Systems   Constitutional:  Negative for chills and fever. HENT:  Negative for ear pain and sore throat. Eyes:  Negative for pain and visual disturbance. Respiratory:  Negative for cough and shortness of breath. Cardiovascular:  Negative for chest pain and palpitations. Gastrointestinal:  Negative for abdominal pain and vomiting. Genitourinary:  Negative for dysuria and hematuria. Musculoskeletal:  Negative for arthralgias and back pain. Skin:  Negative for color change and rash. Neurological:  Negative for seizures and syncope. Psychiatric/Behavioral:  The patient is nervous/anxious. All other systems reviewed and are negative. Historical Information     Patient Active Problem List   Diagnosis    UTI (urinary tract infection)    Primary hypertension    Dyslipidemia    Age-related osteoporosis without current pathological fracture    Osteoarthritis of multiple joints    Allergies     Past Medical History:   Diagnosis Date    Arthritis     GERD (gastroesophageal reflux disease)     Hyperlipidemia     Hypertension     Osteoporosis     Urinary tract infection      Past Surgical History:   Procedure Laterality Date    CATARACT EXTRACTION      COLONOSCOPY N/A 3/2/2017    Procedure: COLONOSCOPY;  Surgeon: Jericho Parry MD;  Location: [address] [place] [address] GI LAB; Service:     JOINT REPLACEMENT Bilateral 2011, 2012    hips    REFRACTIVE SURGERY Right      Social History     Substance and Sexual Activity   Alcohol Use No    Comment: on holidays     Social History     Substance and Sexual Activity   Drug Use No     Social History     Tobacco Use   Smoking Status Never   Smokeless Tobacco Never     History reviewed. No pertinent family history.   Health Maintenance Due   Topic    Pneumococcal Vaccine: 65+ Years (1 - PCV)    COVID-19 Vaccine (3 - Moderna series)    Depression Screening       Meds/Allergies       Current Outpatient Medications:     aspirin 81 MG tablet, Take 81 mg by mouth daily, Disp: , Rfl:     atorvastatin (LIPITOR) 20 mg tablet, TAKE 1 TABLET BY MOUTH DAILY AT  BEDTIME, Disp: 90 tablet, Rfl: 1    bimatoprost (LUMIGAN) 0.01 % ophthalmic drops, 1 drop daily at bedtime, Disp: , Rfl:     calcium carbonate (OS-MACRINA) 600 MG tablet, Take 600 mg by mouth 2 (two) times a day with meals, Disp: , Rfl:     Cholecalciferol (Vitamin D) 50 MCG (2000 UT) CAPS, Take by mouth, Disp: , Rfl:     fluticasone (FLONASE) 50 mcg/act nasal spray, 1 spray into each nostril 2 (two) times a day, Disp: 54.6 mL, Rfl: 1    lisinopril (ZESTRIL) 20 mg tablet, TAKE 1 TABLET BY MOUTH DAILY, Disp: 90 tablet, Rfl: 1    multivitamin (THERAGRAN) TABS, Take 1 tablet by mouth daily, Disp: , Rfl:       Objective:    Vitals:   /76 (BP Location: Right arm, Patient Position: Sitting, Cuff Size: Standard)   Pulse 103   Ht 5' 4" (1.626 m)   Wt 61.2 kg (135 lb)   LMP  (LMP Unknown)   SpO2 97%   BMI 23.17 kg/m²   Body mass index is 23.17 kg/m². Vitals:    10/16/23 1518   Weight: 61.2 kg (135 lb)       Physical Exam  Vitals and nursing note reviewed. Constitutional:       Appearance: Normal appearance. Cardiovascular:      Rate and Rhythm: Normal rate and regular rhythm. Heart sounds: Normal heart sounds. Pulmonary:      Effort: Pulmonary effort is normal.      Breath sounds: Normal breath sounds. Abdominal:      General: Abdomen is flat. Palpations: Abdomen is soft. Musculoskeletal:      Cervical back: Normal range of motion and neck supple. Right lower leg: No edema. Left lower leg: No edema. Skin:     General: Skin is warm and dry. Neurological:      Mental Status: She is alert and oriented to person, place, and time. Lab Review   No visits with results within 2 Month(s) from this visit.    Latest known visit with results is:   Appointment on 03/06/2023   Component Date Value Ref Range Status    WBC 03/06/2023 5.01  4.31 - 10.16 Thousand/uL Final    RBC 03/06/2023 4.11  3.81 - 5.12 Million/uL Final    Hemoglobin 03/06/2023 12.6  11.5 - 15.4 g/dL Final    Hematocrit 03/06/2023 40.0  34.8 - 46.1 % Final    MCV 03/06/2023 97  82 - 98 fL Final    MCH 03/06/2023 30.7  26.8 - 34.3 pg Final    MCHC 03/06/2023 31.5  31.4 - 37.4 g/dL Final    RDW 03/06/2023 13.0  11.6 - 15.1 % Final    MPV 03/06/2023 9.7  8.9 - 12.7 fL Final    Platelets 71/38/9891 314  149 - 390 Thousands/uL Final    nRBC 03/06/2023 0  /100 WBCs Final    Neutrophils Relative 03/06/2023 54  43 - 75 % Final    Immat GRANS % 03/06/2023 0  0 - 2 % Final    Lymphocytes Relative 03/06/2023 32  14 - 44 % Final    Monocytes Relative 03/06/2023 11  4 - 12 % Final    Eosinophils Relative 03/06/2023 2  0 - 6 % Final    Basophils Relative 03/06/2023 1  0 - 1 % Final    Neutrophils Absolute 03/06/2023 2.72  1.85 - 7.62 Thousands/µL Final    Immature Grans Absolute 03/06/2023 0.01  0.00 - 0.20 Thousand/uL Final    Lymphocytes Absolute 03/06/2023 1.62  0.60 - 4.47 Thousands/µL Final    Monocytes Absolute 03/06/2023 0.54  0.17 - 1.22 Thousand/µL Final    Eosinophils Absolute 03/06/2023 0.08  0.00 - 0.61 Thousand/µL Final    Basophils Absolute 03/06/2023 0.04  0.00 - 0.10 Thousands/µL Final    Sodium 03/06/2023 138  135 - 147 mmol/L Final    Potassium 03/06/2023 4.0  3.5 - 5.3 mmol/L Final    Chloride 03/06/2023 108  96 - 108 mmol/L Final    CO2 03/06/2023 25  21 - 32 mmol/L Final    ANION GAP 03/06/2023 5  4 - 13 mmol/L Final    BUN 03/06/2023 13  5 - 25 mg/dL Final    Creatinine 03/06/2023 0.82  0.60 - 1.30 mg/dL Final    Standardized to IDMS reference method    Glucose, Fasting 03/06/2023 84  65 - 99 mg/dL Final    Specimen collection should occur prior to Sulfasalazine administration due to the potential for falsely depressed results. Specimen collection should occur prior to Sulfapyridine administration due to the potential for falsely elevated results.     Calcium 03/06/2023 9.5  8.3 - 10.1 mg/dL Final    AST 03/06/2023 16  5 - 45 U/L Final    Specimen collection should occur prior to Sulfasalazine administration due to the potential for falsely depressed results. ALT 03/06/2023 19  12 - 78 U/L Final    Specimen collection should occur prior to Sulfasalazine and/or Sulfapyridine administration due to the potential for falsely depressed results. Alkaline Phosphatase 03/06/2023 59  46 - 116 U/L Final    Total Protein 03/06/2023 7.2  6.4 - 8.4 g/dL Final    Albumin 03/06/2023 4.1  3.5 - 5.0 g/dL Final    Total Bilirubin 03/06/2023 0.55  0.20 - 1.00 mg/dL Final    Use of this assay is not recommended for patients undergoing treatment with eltrombopag due to the potential for falsely elevated results. eGFR 03/06/2023 65  ml/min/1.73sq m Final    Hemoglobin A1C 03/06/2023 5.5  Normal 3.8-5.6%; PreDiabetic 5.7-6.4%; Diabetic >=6.5%; Glycemic control for adults with diabetes <7.0% % Final    EAG 03/06/2023 111  mg/dl Final    Cholesterol 03/06/2023 164  See Comment mg/dL Final    Cholesterol:         Pediatric <18 Years        Desirable          <170 mg/dL      Borderline High    170-199 mg/dL      High               >=200 mg/dL        Adult >=18 Years            Desirable         <200 mg/dL      Borderline High   200-239 mg/dL      High              >239 mg/dL      Triglycerides 03/06/2023 187 (H)  See Comment mg/dL Final    Triglyceride:     0-9Y            <75mg/dL     10Y-17Y         <90 mg/dL       >=18Y     Normal          <150 mg/dL     Borderline High 150-199 mg/dL     High            200-499 mg/dL        Very High       >499 mg/dL    Specimen collection should occur prior to N-Acetylcysteine or Metamizole administration due to the potential for falsely depressed results. HDL, Direct 03/06/2023 36 (L)  >=50 mg/dL Final    Specimen collection should occur prior to Metamizole administration due to the potential for falsley depressed results.     LDL Calculated 03/06/2023 91  0 - 100 mg/dL Final    LDL Cholesterol:     Optimal <100 mg/dl     Near Optimal      100-129 mg/dl     Above Optimal       Borderline High 130-159 mg/dl       High            160-189 mg/dl       Very High       >189 mg/dl         This screening LDL is a calculated result. It does not have the accuracy of the Direct Measured LDL in the monitoring of patients with hyperlipidemia and/or statin therapy. Direct Measure LDL (QSQ507) must be ordered separately in these patients. Non-HDL-Chol (CHOL-HDL) 03/06/2023 128  mg/dl Final    TSH 3RD GENERATON 03/06/2023 1.570  0.450 - 4.500 uIU/mL Final    The recommended reference ranges for TSH during pregnancy are as follows:   First trimester 0.1 to 2.5 uIU/mL   Second trimester  0.2 to 3.0 uIU/mL   Third trimester 0.3 to 3.0 uIU/m    Note: Normal ranges may not apply to patients who are transgender, non-binary, or whose legal sex, sex at birth, and gender identity differ. Adult TSH (3rd generation) reference range follows the recommended guidelines of the [employer] Association, January, 2020.     Color, UA 03/06/2023 Colorless   Final    Clarity, UA 03/06/2023 Clear   Final    Specific Gravity, UA 03/06/2023 1.008  1.003 - 1.030 Final    pH, UA 03/06/2023 7.5  4.5, 5.0, 5.5, 6.0, 6.5, 7.0, 7.5, 8.0 Final    Leukocytes, UA 03/06/2023 Small (A)  Negative Final    Nitrite, UA 03/06/2023 Negative  Negative Final    Protein, UA 03/06/2023 Negative  Negative mg/dl Final    Glucose, UA 03/06/2023 Negative  Negative mg/dl Final    Ketones, UA 03/06/2023 Negative  Negative mg/dl Final    Urobilinogen, UA 03/06/2023 <2.0  <2.0 mg/dl mg/dl Final    Bilirubin, UA 03/06/2023 Negative  Negative Final    Occult Blood, UA 03/06/2023 Negative  Negative Final    RBC, UA 03/06/2023 1-2  None Seen, 1-2 /hpf Final    WBC, UA 03/06/2023 20-30 (A)  None Seen, 1-2 /hpf Final    Epithelial Cells 03/06/2023 Occasional  None Seen, Occasional /hpf Final    Bacteria, UA 03/06/2023 None Seen  None Seen, Occasional /hpf Final    Urine Culture 03/06/2023 >100,000 cfu/ml Escherichia coli (A)   Final    This organism has been edited. The previous result was Gram Negative Lopez Enteric Like on 3/7/2023 at 1811 EST. Agnes Hadley MD        "This note has been constructed using a voice recognition system. Therefore there may be syntax, spelling, and/or grammatical errors.  Please call if you have any questions. "

## 2023-10-16 NOTE — ASSESSMENT & PLAN NOTE
Patient with osteoporosis did not want to go on any medications in the past now she is agreeable for the injection Prolia every 6 months we will arrange for the calcium level to be drawn and then order the Prolia injection. Recent DEXA scan report reviewed worsening of her osteoporosis.

## 2023-10-25 ENCOUNTER — APPOINTMENT (OUTPATIENT)
Dept: LAB | Facility: CLINIC | Age: 88
End: 2023-10-25
Payer: MEDICARE

## 2023-10-25 DIAGNOSIS — E78.5 DYSLIPIDEMIA: ICD-10-CM

## 2023-10-25 DIAGNOSIS — I10 PRIMARY HYPERTENSION: ICD-10-CM

## 2023-10-25 LAB
ALBUMIN SERPL BCP-MCNC: 4.4 G/DL (ref 3.5–5)
ALP SERPL-CCNC: 116 U/L (ref 34–104)
ALT SERPL W P-5'-P-CCNC: 11 U/L (ref 7–52)
ANION GAP SERPL CALCULATED.3IONS-SCNC: 12 MMOL/L
AST SERPL W P-5'-P-CCNC: 18 U/L (ref 13–39)
BILIRUB SERPL-MCNC: 0.55 MG/DL (ref 0.2–1)
BUN SERPL-MCNC: 10 MG/DL (ref 5–25)
CALCIUM SERPL-MCNC: 10 MG/DL (ref 8.4–10.2)
CHLORIDE SERPL-SCNC: 99 MMOL/L (ref 96–108)
CHOLEST SERPL-MCNC: 158 MG/DL
CO2 SERPL-SCNC: 26 MMOL/L (ref 21–32)
CREAT SERPL-MCNC: 0.63 MG/DL (ref 0.6–1.3)
GFR SERPL CREATININE-BSD FRML MDRD: 81 ML/MIN/1.73SQ M
GLUCOSE P FAST SERPL-MCNC: 90 MG/DL (ref 65–99)
HDLC SERPL-MCNC: 40 MG/DL
LDLC SERPL CALC-MCNC: 68 MG/DL (ref 0–100)
NONHDLC SERPL-MCNC: 118 MG/DL
POTASSIUM SERPL-SCNC: 3.5 MMOL/L (ref 3.5–5.3)
PROT SERPL-MCNC: 7.4 G/DL (ref 6.4–8.4)
SODIUM SERPL-SCNC: 137 MMOL/L (ref 135–147)
TRIGL SERPL-MCNC: 248 MG/DL

## 2023-10-25 PROCEDURE — 36415 COLL VENOUS BLD VENIPUNCTURE: CPT

## 2023-10-25 PROCEDURE — 80053 COMPREHEN METABOLIC PANEL: CPT

## 2023-10-25 PROCEDURE — 80061 LIPID PANEL: CPT

## 2023-11-13 ENCOUNTER — ESTABLISHED COMPREHENSIVE EXAM (OUTPATIENT)
Dept: URBAN - METROPOLITAN AREA CLINIC 6 | Facility: CLINIC | Age: 88
End: 2023-11-13

## 2023-11-13 DIAGNOSIS — H44.512: ICD-10-CM

## 2023-11-13 DIAGNOSIS — H40.1112: ICD-10-CM

## 2023-11-13 PROCEDURE — 92012 INTRM OPH EXAM EST PATIENT: CPT

## 2023-11-13 PROCEDURE — 92083 EXTENDED VISUAL FIELD XM: CPT

## 2023-11-13 ASSESSMENT — TONOMETRY
OD_IOP_MMHG: 21
OS_IOP_MMHG: 14

## 2023-11-13 ASSESSMENT — VISUAL ACUITY: OD_CC: 20/30+2

## 2024-01-23 NOTE — PROGRESS NOTES
Office Visit Note  24     Pattie Manning 88 y.o. female MRN: [id number]  : 1935    Assessment:     1. Age-related osteoporosis without current pathological fracture  Assessment & Plan:  Patient with osteoporosis last DEXA scan report reviewed with the patient patient is afraid to go on any medication she had some side effects with the oral medication I discussed with her  about the Prolia injection again it appears she is agreeable.  Will get lab work done prior to the next visit check the calcium level and subsequently arrange for the Prolia injection if the insurance company approves it.      2. Allergy, subsequent encounter  Assessment & Plan:  Patient is still using Flonase nasal spray as needed appears to be helping with her allergies      3. Dyslipidemia  Assessment & Plan:  Lipid profile done in October reveals cholesterol 158 triglycerides 248 HDL 40 and LDL at 68 currently patient is taking atorvastatin 20 mg at bedtime we will continue with the same.      4. Primary osteoarthritis involving multiple joints  Assessment & Plan:  Patient takes Tylenol as needed for the joint pains avoiding nonsteroidals recommend patient not to take too much Tylenol also      5. Primary hypertension  Assessment & Plan:  Blood pressure is stable today it is 128/76 continue lisinopril 20 mg daily            Depression Screening and Follow-up Plan: Patient was screened for depression during today's encounter. They screened negative with a PHQ-2 score of 0.          Discussion Summary and Plan:  Today's care plan and medications were reviewed with patient in detail and all their questions answered to their satisfaction.    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  Medication reviewed labs reviewed  patient denies symptoms of any chest pain palpitation shortness of breath does have aches and pains in the joints.        The following portions of the patient's history were reviewed and updated as appropriate: allergies, current medications, past family history, past medical history, past social history, past surgical history and problem list.    Review of Systems   Constitutional:  Negative for chills and fever.   HENT:  Negative for ear pain and sore throat.    Eyes:  Negative for pain and visual disturbance.   Respiratory:  Negative for cough and shortness of breath.    Cardiovascular:  Negative for chest pain and palpitations.   Gastrointestinal:  Negative for abdominal pain and vomiting.   Genitourinary:  Negative for dysuria and hematuria.   Musculoskeletal:  Negative for arthralgias and back pain.   Skin:  Negative for color change and rash.   Neurological:  Negative for seizures and syncope.   All other systems reviewed and are negative.        Historical Information   Patient Active Problem List   Diagnosis    UTI (urinary tract infection)    Primary hypertension    Dyslipidemia    Age-related osteoporosis without current pathological fracture    Osteoarthritis of multiple joints    Allergies     Past Medical History:   Diagnosis Date    Arthritis     GERD (gastroesophageal reflux disease)     Hyperlipidemia     Hypertension     Osteoporosis     Urinary tract infection      Past Surgical History:   Procedure Laterality Date    CATARACT EXTRACTION      COLONOSCOPY N/A 3/2/2017    Procedure: COLONOSCOPY;  Surgeon: Pablo John MD;  Location: [facility];  Service:     JOINT REPLACEMENT Bilateral 2011, 2012    hips    REFRACTIVE SURGERY Right      Social History     Substance and Sexual Activity   Alcohol Use No    Comment: on holidays     Social History     Substance and Sexual Activity   Drug Use No     Social History     Tobacco Use   Smoking Status Never    Passive exposure: Never   Smokeless Tobacco  "Never     History reviewed. No pertinent family history.  Health Maintenance Due   Topic    Pneumococcal Vaccine: 65+ Years (1 - PCV)    Zoster Vaccine (2 of 2)    COVID-19 Vaccine (3 - 2023-24 season)    Fall Risk     Medicare Annual Wellness Visit (AWV)       Meds/Allergies       Current Outpatient Medications:     aspirin 81 MG tablet, Take 81 mg by mouth daily, Disp: , Rfl:     atorvastatin (LIPITOR) 20 mg tablet, Take 1 tablet (20 mg total) by mouth daily at bedtime, Disp: 90 tablet, Rfl: 1    bimatoprost (LUMIGAN) 0.01 % ophthalmic drops, 1 drop daily at bedtime, Disp: , Rfl:     calcium carbonate (OS-MACRINA) 600 MG tablet, Take 600 mg by mouth 2 (two) times a day with meals, Disp: , Rfl:     Cholecalciferol (Vitamin D) 50 MCG (2000 UT) CAPS, Take by mouth, Disp: , Rfl:     fluticasone (FLONASE) 50 mcg/act nasal spray, 1 spray into each nostril 2 (two) times a day, Disp: 54.6 mL, Rfl: 1    lisinopril (ZESTRIL) 20 mg tablet, Take 1 tablet (20 mg total) by mouth daily, Disp: 90 tablet, Rfl: 1    multivitamin (THERAGRAN) TABS, Take 1 tablet by mouth daily, Disp: , Rfl:       Objective:    Vitals:   /76 (BP Location: Right arm, Patient Position: Sitting, Cuff Size: Standard)   Pulse 72   Ht 5' 4\" (1.626 m)   Wt 57.2 kg (126 lb)   LMP  (LMP Unknown)   SpO2 98%   BMI 21.63 kg/m²   Body mass index is 21.63 kg/m².  Vitals:    01/24/24 1236   Weight: 57.2 kg (126 lb)       Physical Exam  Vitals and nursing note reviewed.   Constitutional:       Appearance: Normal appearance.   Eyes:      Comments: Patient is blind left eye   Cardiovascular:      Rate and Rhythm: Normal rate and regular rhythm.      Heart sounds: Normal heart sounds.   Pulmonary:      Effort: Pulmonary effort is normal.      Breath sounds: Normal breath sounds.   Abdominal:      Palpations: Abdomen is soft.   Musculoskeletal:      Cervical back: Normal range of motion and neck supple.      Right lower leg: No edema.      Left lower leg: No " edema.   Skin:     General: Skin is warm and dry.   Neurological:      Mental Status: She is alert and oriented to person, place, and time.   Psychiatric:         Mood and Affect: Mood normal.         Behavior: Behavior normal.         Lab Review   No visits with results within 2 Month(s) from this visit.   Latest known visit with results is:   Appointment on 10/25/2023   Component Date Value Ref Range Status    Sodium 10/25/2023 137  135 - 147 mmol/L Final    Potassium 10/25/2023 3.5  3.5 - 5.3 mmol/L Final    Chloride 10/25/2023 99  96 - 108 mmol/L Final    CO2 10/25/2023 26  21 - 32 mmol/L Final    ANION GAP 10/25/2023 12  mmol/L Final    BUN 10/25/2023 10  5 - 25 mg/dL Final    Creatinine 10/25/2023 0.63  0.60 - 1.30 mg/dL Final    Standardized to IDMS reference method    Glucose, Fasting 10/25/2023 90  65 - 99 mg/dL Final    Calcium 10/25/2023 10.0  8.4 - 10.2 mg/dL Final    AST 10/25/2023 18  13 - 39 U/L Final    ALT 10/25/2023 11  7 - 52 U/L Final    Specimen collection should occur prior to Sulfasalazine administration due to the potential for falsely depressed results.     Alkaline Phosphatase 10/25/2023 116 (H)  34 - 104 U/L Final    Total Protein 10/25/2023 7.4  6.4 - 8.4 g/dL Final    Albumin 10/25/2023 4.4  3.5 - 5.0 g/dL Final    Total Bilirubin 10/25/2023 0.55  0.20 - 1.00 mg/dL Final    Use of this assay is not recommended for patients undergoing treatment with eltrombopag due to the potential for falsely elevated results.  N-acetyl-p-benzoquinone imine (metabolite of Acetaminophen) will generate erroneously low results in samples for patients that have taken an overdose of Acetaminophen.    eGFR 10/25/2023 81  ml/min/1.73sq m Final    Cholesterol 10/25/2023 158  See Comment mg/dL Final    Cholesterol:         Pediatric <18 Years        Desirable          <170 mg/dL      Borderline High    170-199 mg/dL      High               >=200 mg/dL        Adult >=18 Years            Desirable         <200  "mg/dL      Borderline High   200-239 mg/dL      High              >239 mg/dL      Triglycerides 10/25/2023 248 (H)  See Comment mg/dL Final    Triglyceride:     0-9Y            <75mg/dL     10Y-17Y         <90 mg/dL       >=18Y     Normal          <150 mg/dL     Borderline High 150-199 mg/dL     High            200-499 mg/dL        Very High       >499 mg/dL    Specimen collection should occur prior to Metamizole administration due to the potential for falsely depressed results.    HDL, Direct 10/25/2023 40 (L)  >=50 mg/dL Final    LDL Calculated 10/25/2023 68  0 - 100 mg/dL Final    LDL Cholesterol:     Optimal           <100 mg/dl     Near Optimal      100-129 mg/dl     Above Optimal       Borderline High 130-159 mg/dl       High            160-189 mg/dl       Very High       >189 mg/dl         This screening LDL is a calculated result.   It does not have the accuracy of the Direct Measured LDL in the monitoring of patients with hyperlipidemia and/or statin therapy.   Direct Measure LDL (FFA957) must be ordered separately in these patients.    Non-HDL-Chol (CHOL-HDL) 10/25/2023 118  mg/dl Final         Geovani Hollingsworth MD        \"This note has been constructed using a voice recognition system.Therefore there may be syntax, spelling, and/or grammatical errors. Please call if you have any questions. \"  "

## 2024-01-24 ENCOUNTER — OFFICE VISIT (OUTPATIENT)
Dept: FAMILY MEDICINE CLINIC | Facility: CLINIC | Age: 89
End: 2024-01-24
Payer: MEDICARE

## 2024-01-24 VITALS
OXYGEN SATURATION: 98 % | BODY MASS INDEX: 21.51 KG/M2 | HEIGHT: 64 IN | HEART RATE: 72 BPM | DIASTOLIC BLOOD PRESSURE: 76 MMHG | WEIGHT: 126 LBS | SYSTOLIC BLOOD PRESSURE: 128 MMHG

## 2024-01-24 DIAGNOSIS — E78.5 DYSLIPIDEMIA: ICD-10-CM

## 2024-01-24 DIAGNOSIS — Z13.0 SCREENING FOR DEFICIENCY ANEMIA: ICD-10-CM

## 2024-01-24 DIAGNOSIS — T78.40XD ALLERGY, SUBSEQUENT ENCOUNTER: ICD-10-CM

## 2024-01-24 DIAGNOSIS — M15.9 PRIMARY OSTEOARTHRITIS INVOLVING MULTIPLE JOINTS: ICD-10-CM

## 2024-01-24 DIAGNOSIS — I10 PRIMARY HYPERTENSION: ICD-10-CM

## 2024-01-24 DIAGNOSIS — R73.03 PRE-DIABETES: ICD-10-CM

## 2024-01-24 DIAGNOSIS — Z13.29 SCREENING FOR THYROID DISORDER: ICD-10-CM

## 2024-01-24 DIAGNOSIS — M81.0 AGE-RELATED OSTEOPOROSIS WITHOUT CURRENT PATHOLOGICAL FRACTURE: Primary | ICD-10-CM

## 2024-01-24 PROCEDURE — 99214 OFFICE O/P EST MOD 30 MIN: CPT | Performed by: INTERNAL MEDICINE

## 2024-01-24 NOTE — ASSESSMENT & PLAN NOTE
Lipid profile done in October reveals cholesterol 158 triglycerides 248 HDL 40 and LDL at 68 currently patient is taking atorvastatin 20 mg at bedtime we will continue with the same.

## 2024-01-24 NOTE — ASSESSMENT & PLAN NOTE
Patient with osteoporosis last DEXA scan report reviewed with the patient patient is afraid to go on any medication she had some side effects with the oral medication I discussed with her  about the Prolia injection again it appears she is agreeable.  Will get lab work done prior to the next visit check the calcium level and subsequently arrange for the Prolia injection if the insurance company approves it.

## 2024-01-24 NOTE — ASSESSMENT & PLAN NOTE
Patient takes Tylenol as needed for the joint pains avoiding nonsteroidals recommend patient not to take too much Tylenol also

## 2024-02-21 PROBLEM — N39.0 UTI (URINARY TRACT INFECTION): Status: RESOLVED | Noted: 2018-07-14 | Resolved: 2024-02-21

## 2024-03-19 DIAGNOSIS — I10 PRIMARY HYPERTENSION: ICD-10-CM

## 2024-03-19 DIAGNOSIS — E78.5 DYSLIPIDEMIA: ICD-10-CM

## 2024-03-19 RX ORDER — LISINOPRIL 20 MG/1
20 TABLET ORAL DAILY
Qty: 90 TABLET | Refills: 1 | Status: SHIPPED | OUTPATIENT
Start: 2024-03-19

## 2024-03-19 RX ORDER — ATORVASTATIN CALCIUM 20 MG/1
20 TABLET, FILM COATED ORAL
Qty: 90 TABLET | Refills: 1 | Status: SHIPPED | OUTPATIENT
Start: 2024-03-19

## 2024-04-15 ENCOUNTER — APPOINTMENT (OUTPATIENT)
Dept: LAB | Facility: CLINIC | Age: 89
End: 2024-04-15
Payer: MEDICARE

## 2024-04-15 DIAGNOSIS — Z13.29 SCREENING FOR THYROID DISORDER: ICD-10-CM

## 2024-04-15 DIAGNOSIS — E78.5 DYSLIPIDEMIA: ICD-10-CM

## 2024-04-15 DIAGNOSIS — R73.03 PRE-DIABETES: ICD-10-CM

## 2024-04-15 DIAGNOSIS — Z13.0 SCREENING FOR DEFICIENCY ANEMIA: ICD-10-CM

## 2024-04-15 DIAGNOSIS — I10 PRIMARY HYPERTENSION: ICD-10-CM

## 2024-04-15 LAB
ALBUMIN SERPL BCP-MCNC: 4.1 G/DL (ref 3.5–5)
ALP SERPL-CCNC: 72 U/L (ref 34–104)
ALT SERPL W P-5'-P-CCNC: 16 U/L (ref 7–52)
ANION GAP SERPL CALCULATED.3IONS-SCNC: 8 MMOL/L (ref 4–13)
AST SERPL W P-5'-P-CCNC: 26 U/L (ref 13–39)
BASOPHILS # BLD AUTO: 0.05 THOUSANDS/ÂΜL (ref 0–0.1)
BASOPHILS NFR BLD AUTO: 1 % (ref 0–1)
BILIRUB SERPL-MCNC: 0.54 MG/DL (ref 0.2–1)
BILIRUB UR QL STRIP: NEGATIVE
BUN SERPL-MCNC: 12 MG/DL (ref 5–25)
CALCIUM SERPL-MCNC: 9.2 MG/DL (ref 8.4–10.2)
CHLORIDE SERPL-SCNC: 102 MMOL/L (ref 96–108)
CHOLEST SERPL-MCNC: 142 MG/DL
CLARITY UR: CLEAR
CO2 SERPL-SCNC: 27 MMOL/L (ref 21–32)
COLOR UR: COLORLESS
CREAT SERPL-MCNC: 0.76 MG/DL (ref 0.6–1.3)
EOSINOPHIL # BLD AUTO: 0.12 THOUSAND/ÂΜL (ref 0–0.61)
EOSINOPHIL NFR BLD AUTO: 3 % (ref 0–6)
ERYTHROCYTE [DISTWIDTH] IN BLOOD BY AUTOMATED COUNT: 14 % (ref 11.6–15.1)
EST. AVERAGE GLUCOSE BLD GHB EST-MCNC: 111 MG/DL
GFR SERPL CREATININE-BSD FRML MDRD: 70 ML/MIN/1.73SQ M
GLUCOSE P FAST SERPL-MCNC: 84 MG/DL (ref 65–99)
GLUCOSE UR STRIP-MCNC: NEGATIVE MG/DL
HBA1C MFR BLD: 5.5 %
HCT VFR BLD AUTO: 40.1 % (ref 34.8–46.1)
HDLC SERPL-MCNC: 37 MG/DL
HGB BLD-MCNC: 13 G/DL (ref 11.5–15.4)
HGB UR QL STRIP.AUTO: NEGATIVE
IMM GRANULOCYTES # BLD AUTO: 0.02 THOUSAND/UL (ref 0–0.2)
IMM GRANULOCYTES NFR BLD AUTO: 0 % (ref 0–2)
KETONES UR STRIP-MCNC: NEGATIVE MG/DL
LDLC SERPL CALC-MCNC: 72 MG/DL (ref 0–100)
LEUKOCYTE ESTERASE UR QL STRIP: NEGATIVE
LYMPHOCYTES # BLD AUTO: 1.62 THOUSANDS/ÂΜL (ref 0.6–4.47)
LYMPHOCYTES NFR BLD AUTO: 36 % (ref 14–44)
MCH RBC QN AUTO: 31.1 PG (ref 26.8–34.3)
MCHC RBC AUTO-ENTMCNC: 32.4 G/DL (ref 31.4–37.4)
MCV RBC AUTO: 96 FL (ref 82–98)
MONOCYTES # BLD AUTO: 0.6 THOUSAND/ÂΜL (ref 0.17–1.22)
MONOCYTES NFR BLD AUTO: 13 % (ref 4–12)
NEUTROPHILS # BLD AUTO: 2.14 THOUSANDS/ÂΜL (ref 1.85–7.62)
NEUTS SEG NFR BLD AUTO: 47 % (ref 43–75)
NITRITE UR QL STRIP: NEGATIVE
NONHDLC SERPL-MCNC: 105 MG/DL
NRBC BLD AUTO-RTO: 0 /100 WBCS
PH UR STRIP.AUTO: 7.5 [PH]
PLATELET # BLD AUTO: 316 THOUSANDS/UL (ref 149–390)
PMV BLD AUTO: 9.9 FL (ref 8.9–12.7)
POTASSIUM SERPL-SCNC: 4.6 MMOL/L (ref 3.5–5.3)
PROT SERPL-MCNC: 6.8 G/DL (ref 6.4–8.4)
PROT UR STRIP-MCNC: NEGATIVE MG/DL
RBC # BLD AUTO: 4.18 MILLION/UL (ref 3.81–5.12)
SODIUM SERPL-SCNC: 137 MMOL/L (ref 135–147)
SP GR UR STRIP.AUTO: 1.01 (ref 1–1.03)
TRIGL SERPL-MCNC: 167 MG/DL
TSH SERPL DL<=0.05 MIU/L-ACNC: 2.99 UIU/ML (ref 0.45–4.5)
UROBILINOGEN UR STRIP-ACNC: <2 MG/DL
WBC # BLD AUTO: 4.55 THOUSAND/UL (ref 4.31–10.16)

## 2024-04-15 PROCEDURE — 85025 COMPLETE CBC W/AUTO DIFF WBC: CPT

## 2024-04-15 PROCEDURE — 84443 ASSAY THYROID STIM HORMONE: CPT

## 2024-04-15 PROCEDURE — 36415 COLL VENOUS BLD VENIPUNCTURE: CPT

## 2024-04-15 PROCEDURE — 80061 LIPID PANEL: CPT

## 2024-04-15 PROCEDURE — 81003 URINALYSIS AUTO W/O SCOPE: CPT

## 2024-04-15 PROCEDURE — 83036 HEMOGLOBIN GLYCOSYLATED A1C: CPT

## 2024-04-15 PROCEDURE — 80053 COMPREHEN METABOLIC PANEL: CPT

## 2024-04-23 NOTE — PATIENT INSTRUCTIONS
Medicare Preventive Visit Patient Instructions  Thank you for completing your Welcome to Medicare Visit or Medicare Annual Wellness Visit today. Your next wellness visit will be due in one year (4/24/2025).  The screening/preventive services that you may require over the next 5-10 years are detailed below. Some tests may not apply to you based off risk factors and/or age. Screening tests ordered at today's visit but not completed yet may show as past due. Also, please note that scanned in results may not display below.  Preventive Screenings:  Service Recommendations Previous Testing/Comments   Colorectal Cancer Screening  * Colonoscopy    * Fecal Occult Blood Test (FOBT)/Fecal Immunochemical Test (FIT)  * Fecal DNA/Cologuard Test  * Flexible Sigmoidoscopy Age: 45-75 years old   Colonoscopy: every 10 years (may be performed more frequently if at higher risk)  OR  FOBT/FIT: every 1 year  OR  Cologuard: every 3 years  OR  Sigmoidoscopy: every 5 years  Screening may be recommended earlier than age 45 if at higher risk for colorectal cancer. Also, an individualized decision between you and your healthcare provider will decide whether screening between the ages of 76-85 would be appropriate. Colonoscopy: Not on file  FOBT/FIT: Not on file  Cologuard: Not on file  Sigmoidoscopy: Not on file          Breast Cancer Screening Age: 40+ years old  Frequency: every 1-2 years  Not required if history of left and right mastectomy Mammogram: 05/07/2021        Cervical Cancer Screening Between the ages of 21-29, pap smear recommended once every 3 years.   Between the ages of 30-65, can perform pap smear with HPV co-testing every 5 years.   Recommendations may differ for women with a history of total hysterectomy, cervical cancer, or abnormal pap smears in past. Pap Smear: Not on file        Hepatitis C Screening Once for adults born between 1945 and 1965  More frequently in patients at high risk for Hepatitis C Hep C Antibody: Not  on file        Diabetes Screening 1-2 times per year if you're at risk for diabetes or have pre-diabetes Fasting glucose: 84 mg/dL (4/15/2024)  A1C: 5.5 % (4/15/2024)      Cholesterol Screening Once every 5 years if you don't have a lipid disorder. May order more often based on risk factors. Lipid panel: 04/15/2024          Other Preventive Screenings Covered by Medicare:  Abdominal Aortic Aneurysm (AAA) Screening: covered once if your at risk. You're considered to be at risk if you have a family history of AAA.  Lung Cancer Screening: covers low dose CT scan once per year if you meet all of the following conditions: (1) Age 55-77; (2) No signs or symptoms of lung cancer; (3) Current smoker or have quit smoking within the last 15 years; (4) You have a tobacco smoking history of at least 20 pack years (packs per day multiplied by number of years you smoked); (5) You get a written order from a healthcare provider.  Glaucoma Screening: covered annually if you're considered high risk: (1) You have diabetes OR (2) Family history of glaucoma OR (3)  aged 50 and older OR (4)  American aged 65 and older  Osteoporosis Screening: covered every 2 years if you meet one of the following conditions: (1) You're estrogen deficient and at risk for osteoporosis based off medical history and other findings; (2) Have a vertebral abnormality; (3) On glucocorticoid therapy for more than 3 months; (4) Have primary hyperparathyroidism; (5) On osteoporosis medications and need to assess response to drug therapy.   Last bone density test (DXA Scan): 10/03/2023.  HIV Screening: covered annually if you're between the age of 15-65. Also covered annually if you are younger than 15 and older than 65 with risk factors for HIV infection. For pregnant patients, it is covered up to 3 times per pregnancy.    Immunizations:  Immunization Recommendations   Influenza Vaccine Annual influenza vaccination during flu season is  recommended for all persons aged >= 6 months who do not have contraindications   Pneumococcal Vaccine   * Pneumococcal conjugate vaccine = PCV13 (Prevnar 13), PCV15 (Vaxneuvance), PCV20 (Prevnar 20)  * Pneumococcal polysaccharide vaccine = PPSV23 (Pneumovax) Adults 19-65 yo with certain risk factors or if 65+ yo  If never received any pneumonia vaccine: recommend Prevnar 20 (PCV20)  Give PCV20 if previously received 1 dose of PCV13 or PPSV23   Hepatitis B Vaccine 3 dose series if at intermediate or high risk (ex: diabetes, end stage renal disease, liver disease)   Respiratory syncytial virus (RSV) Vaccine - COVERED BY MEDICARE PART D  * RSVPreF3 (Arexvy) CDC recommends that adults 60 years of age and older may receive a single dose of RSV vaccine using shared clinical decision-making (SCDM)   Tetanus (Td) Vaccine - COST NOT COVERED BY MEDICARE PART B Following completion of primary series, a booster dose should be given every 10 years to maintain immunity against tetanus. Td may also be given as tetanus wound prophylaxis.   Tdap Vaccine - COST NOT COVERED BY MEDICARE PART B Recommended at least once for all adults. For pregnant patients, recommended with each pregnancy.   Shingles Vaccine (Shingrix) - COST NOT COVERED BY MEDICARE PART B  2 shot series recommended in those 19 years and older who have or will have weakened immune systems or those 50 years and older     Health Maintenance Due:  There are no preventive care reminders to display for this patient.  Immunizations Due:      Topic Date Due   • Pneumococcal Vaccine: 65+ Years (1 of 1 - PCV) Never done   • COVID-19 Vaccine (3 - 2023-24 season) 09/01/2023     Advance Directives   What are advance directives?  Advance directives are legal documents that state your wishes and plans for medical care. These plans are made ahead of time in case you lose your ability to make decisions for yourself. Advance directives can apply to any medical decision, such as the  treatments you want, and if you want to donate organs.   What are the types of advance directives?  There are many types of advance directives, and each state has rules about how to use them. You may choose a combination of any of the following:  Living will:  This is a written record of the treatment you want. You can also choose which treatments you do not want, which to limit, and which to stop at a certain time. This includes surgery, medicine, IV fluid, and tube feedings.   Durable power of  for healthcare (DPAHC):  This is a written record that states who you want to make healthcare choices for you when you are unable to make them for yourself. This person, called a proxy, is usually a family member or a friend. You may choose more than 1 proxy.  Do not resuscitate (DNR) order:  A DNR order is used in case your heart stops beating or you stop breathing. It is a request not to have certain forms of treatment, such as CPR. A DNR order may be included in other types of advance directives.  Medical directive:  This covers the care that you want if you are in a coma, near death, or unable to make decisions for yourself. You can list the treatments you want for each condition. Treatment may include pain medicine, surgery, blood transfusions, dialysis, IV or tube feedings, and a ventilator (breathing machine).  Values history:  This document has questions about your views, beliefs, and how you feel and think about life. This information can help others choose the care that you would choose.  Why are advance directives important?  An advance directive helps you control your care. Although spoken wishes may be used, it is better to have your wishes written down. Spoken wishes can be misunderstood, or not followed. Treatments may be given even if you do not want them. An advance directive may make it easier for your family to make difficult choices about your care.       © Copyright [employer] 2018  Information is for End User's use only and may not be sold, redistributed or otherwise used for commercial purposes. All illustrations and images included in CareNotes® are the copyrighted property of [employer].A[employer]M., Inc. or [employer]

## 2024-04-23 NOTE — PROGRESS NOTES
Name: Pattie Manning      : 1935      MRN: [id number]  Encounter Provider: Geovani Hollingsworth MD  Encounter Date: 2024   Encounter department: [facility]    Assessment & Plan     {There are no diagnoses linked to this encounter. (Refresh or delete this SmartLink)}       Subjective      HPI  Review of Systems    Current Outpatient Medications on File Prior to Visit   Medication Sig    aspirin 81 MG tablet Take 81 mg by mouth daily    atorvastatin (LIPITOR) 20 mg tablet TAKE 1 TABLET BY MOUTH DAILY AT  BEDTIME    bimatoprost (LUMIGAN) 0.01 % ophthalmic drops 1 drop daily at bedtime    calcium carbonate (OS-MACRINA) 600 MG tablet Take 600 mg by mouth 2 (two) times a day with meals    Cholecalciferol (Vitamin D) 50 MCG ( UT) CAPS Take by mouth    fluticasone (FLONASE) 50 mcg/act nasal spray 1 spray into each nostril 2 (two) times a day    lisinopril (ZESTRIL) 20 mg tablet TAKE 1 TABLET BY MOUTH DAILY    multivitamin (THERAGRAN) TABS Take 1 tablet by mouth daily       Objective     LMP  (LMP Unknown)     Physical Exam  Geovani Hollingsworth MD

## 2024-04-23 NOTE — PROGRESS NOTES
Assessment and Plan:     Problem List Items Addressed This Visit        Cardiovascular and Mediastinum    Primary hypertension     Blood pressure is today 126/74 currently taking lisinopril 20 mg daily will continue         Relevant Medications    lisinopril (ZESTRIL) 20 mg tablet       Musculoskeletal and Integument    Age-related osteoporosis without current pathological fracture - Primary     Patient with osteoporosis in the past she was refusing to go on medication now she has agreed to have the Prolia injection we will arrange for the same side effects explained.         Osteoarthritis of multiple joints     Complains of aches and pains in the joints taking Tylenol as needed will continue            Other    Dyslipidemia     Patient cholesterol level is 142 triglycerides 167 HDL is 37 LDL is 72 currently patient is taking atorvastatin 20 mg at bedtime we will continue         Relevant Medications    atorvastatin (LIPITOR) 20 mg tablet    Allergies     Patient with allergies on Flonase nasal spray we will continue the same         Relevant Medications    fluticasone (FLONASE) 50 mcg/act nasal spray   Other Visit Diagnoses     Medicare annual wellness visit, subsequent               Preventive health issues were discussed with patient, and age appropriate screening tests were ordered as noted in patient's After Visit Summary.  Personalized health advice and appropriate referrals for health education or preventive services given if needed, as noted in patient's After Visit Summary.     History of Present Illness:     Patient presents for a Medicare Wellness Visit    Patient is coming here for the follow-up evaluation with regards to her symptoms of hypertension, dyslipidemia, osteoporosis.  Patient is blind in the left eye her vision is getting poor in the right side.  Medication reviewed labs reviewed recently had lab work done.  Patient calcium level is 9.2.       Patient Care Team:  Geovani Hollingsworth MD as PCP  - General (Internal Medicine)  Pablo John MD as Endoscopist     Review of Systems:     Review of Systems   Constitutional:  Negative for chills and fever.   HENT:  Negative for ear pain and sore throat.    Eyes:  Negative for pain and visual disturbance.        Patient is blind in the left eye right eye vision according to her is getting poor   Respiratory:  Negative for cough and shortness of breath.    Cardiovascular:  Negative for chest pain and palpitations.   Gastrointestinal:  Negative for abdominal pain and vomiting.   Genitourinary:  Negative for dysuria and hematuria.   Musculoskeletal:  Positive for arthralgias and back pain.   Skin:  Negative for color change and rash.   Neurological:  Negative for seizures and syncope.   All other systems reviewed and are negative.       Problem List:     Patient Active Problem List   Diagnosis   • Primary hypertension   • Dyslipidemia   • Age-related osteoporosis without current pathological fracture   • Osteoarthritis of multiple joints   • Allergies      Past Medical and Surgical History:     Past Medical History:   Diagnosis Date   • Arthritis    • GERD (gastroesophageal reflux disease)    • Hyperlipidemia    • Hypertension    • Osteoporosis    • Urinary tract infection      Past Surgical History:   Procedure Laterality Date   • CATARACT EXTRACTION     • COLONOSCOPY N/A 3/2/2017    Procedure: COLONOSCOPY;  Surgeon: Pablo John MD;  Location: [facility];  Service:    • JOINT REPLACEMENT Bilateral 2011, 2012    hips   • REFRACTIVE SURGERY Right       Family History:     History reviewed. No pertinent family history.   Social History:     Social History     Socioeconomic History   • Marital status: Single     Spouse name: None   • Number of children: None   • Years of education: None   • Highest education level: None   Occupational History   • None   Tobacco Use   • Smoking status: Never     Passive exposure: Never   • Smokeless tobacco: Never   Vaping Use   •  Vaping status: Never Used   Substance and Sexual Activity   • Alcohol use: No     Comment: on holidays   • Drug use: No   • Sexual activity: Never   Other Topics Concern   • None   Social History Narrative   • None     Social Determinants of Health     Financial Resource Strain: Low Risk  (3/15/2023)    Overall Financial Resource Strain (CARDIA)    • Difficulty of Paying Living Expenses: Not very hard   Food Insecurity: No Food Insecurity (4/23/2024)    Hunger Vital Sign    • Worried About Running Out of Food in the Last Year: Never true    • Ran Out of Food in the Last Year: Never true   Transportation Needs: No Transportation Needs (4/23/2024)    PRAPARE - Transportation    • Lack of Transportation (Medical): No    • Lack of Transportation (Non-Medical): No   Physical Activity: Not on file   Stress: Not on file   Social Connections: Not on file   Intimate Partner Violence: Not on file   Housing Stability: Low Risk  (4/23/2024)    Housing Stability Vital Sign    • Unable to Pay for Housing in the Last Year: No    • Number of Places Lived in the Last Year: 1    • Unstable Housing in the Last Year: No      Medications and Allergies:     Current Outpatient Medications   Medication Sig Dispense Refill   • aspirin 81 MG tablet Take 81 mg by mouth daily     • atorvastatin (LIPITOR) 20 mg tablet Take 1 tablet (20 mg total) by mouth daily at bedtime 90 tablet 1   • bimatoprost (LUMIGAN) 0.01 % ophthalmic drops 1 drop daily at bedtime     • calcium carbonate (OS-MACRINA) 600 MG tablet Take 600 mg by mouth 2 (two) times a day with meals     • Cholecalciferol (Vitamin D) 50 MCG (2000 UT) CAPS Take by mouth     • fluticasone (FLONASE) 50 mcg/act nasal spray 1 spray into each nostril 2 (two) times a day 54.6 mL 1   • lisinopril (ZESTRIL) 20 mg tablet Take 1 tablet (20 mg total) by mouth daily 90 tablet 1   • multivitamin (THERAGRAN) TABS Take 1 tablet by mouth daily       No current facility-administered medications for this visit.      No Known Allergies   Immunizations:     Immunization History   Administered Date(s) Administered   • COVID-19 MODERNA VACC 0.5 ML IM 02/25/2021, 03/25/2021   • Influenza, high dose seasonal 0.7 mL 11/03/2022, 10/16/2023   • Zoster Vaccine Recombinant 02/17/2020      Health Maintenance:     There are no preventive care reminders to display for this patient.      Topic Date Due   • Pneumococcal Vaccine: 65+ Years (1 of 1 - PCV) Never done   • COVID-19 Vaccine (3 - 2023-24 season) 09/01/2023      Medicare Screening Tests and Risk Assessments:     Pattie is here for her Subsequent Wellness visit. Last Medicare Wellness visit information reviewed, patient interviewed, no change since last AWV. Last Medicare Wellness visit information reviewed, patient interviewed and updates made to the record today.      Health Risk Assessment:   Patient rates overall health as very good. Patient feels that their physical health rating is same. Patient is satisfied with their life. Eyesight was rated as same. Hearing was rated as same. Patient feels that their emotional and mental health rating is same. Patients states they are never, rarely angry. Patient states they are sometimes unusually tired/fatigued. Pain experienced in the last 7 days has been none. Patient states that she has experienced no weight loss or gain in last 6 months. The patient appears to be quite able to enjoy her life.    Fall Risk Screening:   In the past year, patient has experienced: no history of falling in past year      Urinary Incontinence Screening:   Patient has not leaked urine accidently in the last six months.     Home Safety:  Patient does not have trouble with stairs inside or outside of their home. Patient has working smoke alarms and has no working carbon monoxide detector. Home safety hazards include: none. The patient feels safe in her environment    Nutrition:   Current diet is Low Cholesterol and Limited junk food. The patient eats as  healthy as can be, with fruits and vegetables, the patient enjoys chicken and turkey as her protein sources.    Medications:   Patient is currently taking over-the-counter supplements. OTC medications include: see medication list. Patient is able to manage medications. The patient is able to manage her medications.    Activities of Daily Living (ADLs)/Instrumental Activities of Daily Living (IADLs):   Walk and transfer into and out of bed and chair?: Yes  Dress and groom yourself?: Yes    Bathe or shower yourself?: Yes    Feed yourself? Yes  Do your laundry/housekeeping?: Yes  Manage your money, pay your bills and track your expenses?: Yes  Make your own meals?: Yes    Do your own shopping?: Yes    ADL comments: The patient is independent in her ADL's.    Previous Hospitalizations:   Any hospitalizations or ED visits within the last 12 months?: Yes      Hospitalization Comments: Last March outpatient at [facility] for an eye procedure.    Advance Care Planning:   Living will: Yes    Durable POA for healthcare: Yes    Advanced directive: Yes    Five wishes given: No    End of Life Decisions reviewed with patient: Yes    Provider agrees with end of life decisions: Yes      Comments: Patient does not want intubation or tube feeding.  She wants DNR.  Her neighbor is the power of  for healthcare    Cognitive Screening:   Provider or family/friend/caregiver concerned regarding cognition?: No    PREVENTIVE SCREENINGS      Cardiovascular Screening:    General: Screening Current      Diabetes Screening:     General: Screening Current      Colorectal Cancer Screening:     General: Screening Not Indicated      Breast Cancer Screening:     General: Screening Not Indicated      Cervical Cancer Screening:    General: Screening Not Indicated      Osteoporosis Screening:    General: History Osteoporosis and Screening Current      Abdominal Aortic Aneurysm (AAA) Screening:        General: Screening Not Indicated      Lung  "Cancer Screening:     General: Screening Not Indicated      Hepatitis C Screening:    General: Screening Not Indicated    Screening, Brief Intervention, and Referral to Treatment (SBIRT)    Screening  Typical number of drinks in a day: 0  Typical number of drinks in a week: 0  Interpretation: Low risk drinking behavior.    Single Item Drug Screening:  How often have you used an illegal drug (including marijuana) or a prescription medication for non-medical reasons in the past year? never    Single Item Drug Screen Score: 0  Interpretation: Negative screen for possible drug use disorder    Brief Intervention  Alcohol & drug use screenings were reviewed. No concerns regarding substance use disorder identified.     Other Counseling Topics:   Car/seat belt/driving safety and calcium and vitamin D intake and regular weightbearing exercise.     No results found.     Physical Exam:     /74 (BP Location: Right arm, Patient Position: Sitting, Cuff Size: Standard)   Pulse 98   Ht 5' 4\" (1.626 m)   Wt 59 kg (130 lb)   LMP  (LMP Unknown)   SpO2 97%   BMI 22.31 kg/m²     Physical Exam  Vitals and nursing note reviewed.   Constitutional:       General: She is not in acute distress.     Appearance: She is well-developed.   HENT:      Head: Normocephalic and atraumatic.   Eyes:      Conjunctiva/sclera: Conjunctivae normal.      Comments: Patient is blind in the left eye   Cardiovascular:      Rate and Rhythm: Normal rate and regular rhythm.      Heart sounds: No murmur heard.  Pulmonary:      Effort: Pulmonary effort is normal. No respiratory distress.      Breath sounds: Normal breath sounds.   Abdominal:      Palpations: Abdomen is soft.      Tenderness: There is no abdominal tenderness.   Musculoskeletal:         General: No swelling.      Cervical back: Neck supple.   Skin:     General: Skin is warm and dry.      Capillary Refill: Capillary refill takes less than 2 seconds.   Neurological:      Mental Status: She is " alert.   Psychiatric:         Mood and Affect: Mood normal.        Recent Results (from the past 1344 hour(s))   CBC and differential    Collection Time: 04/15/24  9:26 AM   Result Value Ref Range    WBC 4.55 4.31 - 10.16 Thousand/uL    RBC 4.18 3.81 - 5.12 Million/uL    Hemoglobin 13.0 11.5 - 15.4 g/dL    Hematocrit 40.1 34.8 - 46.1 %    MCV 96 82 - 98 fL    MCH 31.1 26.8 - 34.3 pg    MCHC 32.4 31.4 - 37.4 g/dL    RDW 14.0 11.6 - 15.1 %    MPV 9.9 8.9 - 12.7 fL    Platelets 316 149 - 390 Thousands/uL    nRBC 0 /100 WBCs    Segmented % 47 43 - 75 %    Immature Grans % 0 0 - 2 %    Lymphocytes % 36 14 - 44 %    Monocytes % 13 (H) 4 - 12 %    Eosinophils Relative 3 0 - 6 %    Basophils Relative 1 0 - 1 %    Absolute Neutrophils 2.14 1.85 - 7.62 Thousands/µL    Absolute Immature Grans 0.02 0.00 - 0.20 Thousand/uL    Absolute Lymphocytes 1.62 0.60 - 4.47 Thousands/µL    Absolute Monocytes 0.60 0.17 - 1.22 Thousand/µL    Eosinophils Absolute 0.12 0.00 - 0.61 Thousand/µL    Basophils Absolute 0.05 0.00 - 0.10 Thousands/µL   Comprehensive metabolic panel    Collection Time: 04/15/24  9:26 AM   Result Value Ref Range    Sodium 137 135 - 147 mmol/L    Potassium 4.6 3.5 - 5.3 mmol/L    Chloride 102 96 - 108 mmol/L    CO2 27 21 - 32 mmol/L    ANION GAP 8 4 - 13 mmol/L    BUN 12 5 - 25 mg/dL    Creatinine 0.76 0.60 - 1.30 mg/dL    Glucose, Fasting 84 65 - 99 mg/dL    Calcium 9.2 8.4 - 10.2 mg/dL    AST 26 13 - 39 U/L    ALT 16 7 - 52 U/L    Alkaline Phosphatase 72 34 - 104 U/L    Total Protein 6.8 6.4 - 8.4 g/dL    Albumin 4.1 3.5 - 5.0 g/dL    Total Bilirubin 0.54 0.20 - 1.00 mg/dL    eGFR 70 ml/min/1.73sq m   Hemoglobin A1C    Collection Time: 04/15/24  9:26 AM   Result Value Ref Range    Hemoglobin A1C 5.5 Normal 4.0-5.6%; PreDiabetic 5.7-6.4%; Diabetic >=6.5%; Glycemic control for adults with diabetes <7.0% %     mg/dl   Lipid panel    Collection Time: 04/15/24  9:26 AM   Result Value Ref Range    Cholesterol 142 See  Comment mg/dL    Triglycerides 167 (H) See Comment mg/dL    HDL, Direct 37 (L) >=50 mg/dL    LDL Calculated 72 0 - 100 mg/dL    Non-HDL-Chol (CHOL-HDL) 105 mg/dl   TSH, 3rd generation with Free T4 reflex    Collection Time: 04/15/24  9:26 AM   Result Value Ref Range    TSH 3RD GENERATON 2.993 0.450 - 4.500 uIU/mL   UA w Reflex to Microscopic w Reflex to Culture    Collection Time: 04/15/24  9:26 AM    Specimen: Urine, Clean Catch   Result Value Ref Range    Color, UA Colorless     Clarity, UA Clear     Specific Gravity, UA 1.010 1.003 - 1.030    pH, UA 7.5 4.5, 5.0, 5.5, 6.0, 6.5, 7.0, 7.5, 8.0    Leukocytes, UA Negative Negative    Nitrite, UA Negative Negative    Protein, UA Negative Negative mg/dl    Glucose, UA Negative Negative mg/dl    Ketones, UA Negative Negative mg/dl    Urobilinogen, UA <2.0 <2.0 mg/dl mg/dl    Bilirubin, UA Negative Negative    Occult Blood, UA Negative Negative        Geovani Hollingsworth MD

## 2024-04-24 ENCOUNTER — OFFICE VISIT (OUTPATIENT)
Dept: FAMILY MEDICINE CLINIC | Facility: CLINIC | Age: 89
End: 2024-04-24
Payer: MEDICARE

## 2024-04-24 VITALS
HEIGHT: 64 IN | DIASTOLIC BLOOD PRESSURE: 74 MMHG | WEIGHT: 130 LBS | SYSTOLIC BLOOD PRESSURE: 126 MMHG | HEART RATE: 98 BPM | OXYGEN SATURATION: 97 % | BODY MASS INDEX: 22.2 KG/M2

## 2024-04-24 DIAGNOSIS — M81.0 AGE-RELATED OSTEOPOROSIS WITHOUT CURRENT PATHOLOGICAL FRACTURE: Primary | ICD-10-CM

## 2024-04-24 DIAGNOSIS — Z00.00 MEDICARE ANNUAL WELLNESS VISIT, SUBSEQUENT: ICD-10-CM

## 2024-04-24 DIAGNOSIS — E78.5 DYSLIPIDEMIA: ICD-10-CM

## 2024-04-24 DIAGNOSIS — T78.40XD ALLERGY, SUBSEQUENT ENCOUNTER: ICD-10-CM

## 2024-04-24 DIAGNOSIS — I10 PRIMARY HYPERTENSION: ICD-10-CM

## 2024-04-24 DIAGNOSIS — M15.9 PRIMARY OSTEOARTHRITIS INVOLVING MULTIPLE JOINTS: ICD-10-CM

## 2024-04-24 PROCEDURE — 99213 OFFICE O/P EST LOW 20 MIN: CPT | Performed by: INTERNAL MEDICINE

## 2024-04-24 PROCEDURE — G0439 PPPS, SUBSEQ VISIT: HCPCS | Performed by: INTERNAL MEDICINE

## 2024-04-24 RX ORDER — LISINOPRIL 20 MG/1
20 TABLET ORAL DAILY
Qty: 90 TABLET | Refills: 1 | Status: SHIPPED | OUTPATIENT
Start: 2024-04-24

## 2024-04-24 RX ORDER — ATORVASTATIN CALCIUM 20 MG/1
20 TABLET, FILM COATED ORAL
Qty: 90 TABLET | Refills: 1 | Status: SHIPPED | OUTPATIENT
Start: 2024-04-24

## 2024-04-24 RX ORDER — FLUTICASONE PROPIONATE 50 MCG
1 SPRAY, SUSPENSION (ML) NASAL 2 TIMES DAILY
Qty: 54.6 ML | Refills: 1 | Status: SHIPPED | OUTPATIENT
Start: 2024-04-24

## 2024-04-24 NOTE — ASSESSMENT & PLAN NOTE
Patient with osteoporosis in the past she was refusing to go on medication now she has agreed to have the Prolia injection we will arrange for the same side effects explained.

## 2024-04-24 NOTE — ASSESSMENT & PLAN NOTE
Patient cholesterol level is 142 triglycerides 167 HDL is 37 LDL is 72 currently patient is taking atorvastatin 20 mg at bedtime we will continue

## 2024-05-09 ENCOUNTER — ESTABLISHED COMPREHENSIVE EXAM (OUTPATIENT)
Dept: URBAN - METROPOLITAN AREA CLINIC 6 | Facility: CLINIC | Age: 89
End: 2024-05-09

## 2024-05-09 DIAGNOSIS — H44.512: ICD-10-CM

## 2024-05-09 DIAGNOSIS — Z96.1: ICD-10-CM

## 2024-05-09 DIAGNOSIS — H04.123: ICD-10-CM

## 2024-05-09 DIAGNOSIS — H40.1112: ICD-10-CM

## 2024-05-09 PROCEDURE — 92020 GONIOSCOPY: CPT

## 2024-05-09 PROCEDURE — 92014 COMPRE OPH EXAM EST PT 1/>: CPT

## 2024-05-09 PROCEDURE — 92133 CPTRZD OPH DX IMG PST SGM ON: CPT

## 2024-05-09 PROCEDURE — 92202 OPSCPY EXTND ON/MAC DRAW: CPT

## 2024-05-09 ASSESSMENT — TONOMETRY
OS_IOP_MMHG: 22
OD_IOP_MMHG: 24

## 2024-05-09 ASSESSMENT — VISUAL ACUITY: OD_CC: 20/40

## 2024-08-03 ENCOUNTER — TELEPHONE (OUTPATIENT)
Dept: OTHER | Facility: OTHER | Age: 89
End: 2024-08-03

## 2024-08-03 NOTE — TELEPHONE ENCOUNTER
Patient called today regarding her Appointment that is scheduled for Monday Morning, Patient would like to change appointment to later in the afternoon. Please call patient back to confirm or Reschedule.

## 2024-08-07 ENCOUNTER — RA CDI HCC (OUTPATIENT)
Dept: OTHER | Facility: HOSPITAL | Age: 89
End: 2024-08-07

## 2024-08-13 NOTE — PROGRESS NOTES
Office Visit Note  24     Pattie Manning 89 y.o. female MRN: [id number]  : 1935    Assessment:     1. Primary hypertension  Assessment & Plan:  Blood pressure is stable at 118/72 on lisinopril 20 mg daily will continue with the same  2. Age-related osteoporosis without current pathological fracture  Assessment & Plan:  Patient has osteoporosis last DEXA scan 2023 she was refusing to go on any medications I discussed with her about the Prolia injection the last time she agreed but however she did not go for the injection also.  Recommend patient to take calcium and vitamin D continue the same.  3. Dyslipidemia  Assessment & Plan:  Patient lipid profile shows cholesterol at 142 triglycerides 167 HDL 37 LDL 72 done on April 15 currently taking atorvastatin 20 mg at bedtime we will continue the same follow-up with repeat lipid profile later  4. Primary osteoarthritis involving multiple joints  Assessment & Plan:  As mentioned before patient complains of aches and pains in the low back area hip joint area movements of the spine and hip causing some discomfort and pain recommend just take Tylenol for now as needed status post bilateral hip replacement.  5. Allergy, subsequent encounter  Assessment & Plan:  Patient with allergies on Flonase nasal spray will continue the same.             Discussion Summary and Plan:  Today's care plan and medications were reviewed with patient in detail and all their questions answered to their satisfaction.    Chief Complaint   Patient presents with    Follow-up      Subjective:  Patient is coming for evaluation regarding symptoms of hypertension, dyslipidemia, DJD, osteoporosis.  Patient complains of low back pain joint pains on and off.  Denies any symptoms of chest pain palpitation shortness of breath.  Patient lives alone history of bilateral hip replacement.  Medications reviewed labs reviewed.        The following portions of the patient's history were  reviewed and updated as appropriate: allergies, current medications, past family history, past medical history, past social history, past surgical history and problem list.    Review of Systems   Constitutional:  Negative for chills and fever.   HENT:  Negative for ear pain and sore throat.    Eyes:  Negative for pain and visual disturbance.   Respiratory:  Negative for cough and shortness of breath.    Cardiovascular:  Negative for chest pain and palpitations.   Gastrointestinal:  Negative for abdominal pain and vomiting.   Genitourinary:  Negative for dysuria and hematuria.   Musculoskeletal:  Positive for arthralgias and back pain.   Skin:  Negative for color change and rash.   Neurological:  Negative for seizures and syncope.   All other systems reviewed and are negative.        Historical Information   Patient Active Problem List   Diagnosis    Primary hypertension    Dyslipidemia    Age-related osteoporosis without current pathological fracture    Osteoarthritis of multiple joints    Allergies     Past Medical History:   Diagnosis Date    Arthritis     GERD (gastroesophageal reflux disease)     Hyperlipidemia     Hypertension     Osteoporosis     Urinary tract infection      Past Surgical History:   Procedure Laterality Date    CATARACT EXTRACTION      COLONOSCOPY N/A 3/2/2017    Procedure: COLONOSCOPY;  Surgeon: Pablo John MD;  Location: [facility];  Service:     JOINT REPLACEMENT Bilateral 2011, 2012    hips    REFRACTIVE SURGERY Right      Social History     Substance and Sexual Activity   Alcohol Use No    Comment: on holidays     Social History     Substance and Sexual Activity   Drug Use No     Social History     Tobacco Use   Smoking Status Never    Passive exposure: Never   Smokeless Tobacco Never     History reviewed. No pertinent family history.  Health Maintenance Due   Topic    RSV Vaccine Age 60+ Years (1 - 1-dose 60+ series)    Pneumococcal Vaccine: 65+ Years (1 of 1 - PCV)    Zoster  "Vaccine (2 of 2)    COVID-19 Vaccine (3 - 2023-24 season)    Influenza Vaccine (1)    Depression Screening       Meds/Allergies       Current Outpatient Medications:     aspirin 81 MG tablet, Take 81 mg by mouth daily, Disp: , Rfl:     atorvastatin (LIPITOR) 20 mg tablet, Take 1 tablet (20 mg total) by mouth daily at bedtime, Disp: 90 tablet, Rfl: 1    bimatoprost (LUMIGAN) 0.01 % ophthalmic drops, 1 drop daily at bedtime, Disp: , Rfl:     calcium carbonate (OS-MACRINA) 600 MG tablet, Take 600 mg by mouth 2 (two) times a day with meals, Disp: , Rfl:     Cholecalciferol (Vitamin D) 50 MCG (2000 UT) CAPS, Take by mouth, Disp: , Rfl:     fluticasone (FLONASE) 50 mcg/act nasal spray, 1 spray into each nostril 2 (two) times a day, Disp: 54.6 mL, Rfl: 1    lisinopril (ZESTRIL) 20 mg tablet, Take 1 tablet (20 mg total) by mouth daily, Disp: 90 tablet, Rfl: 1    multivitamin (THERAGRAN) TABS, Take 1 tablet by mouth daily, Disp: , Rfl:       Objective:    Vitals:   /72 (BP Location: Right arm, Patient Position: Sitting, Cuff Size: Standard)   Pulse 76   Ht 5' 4\" (1.626 m)   Wt 57.5 kg (126 lb 12.8 oz)   LMP  (LMP Unknown)   SpO2 92%   BMI 21.77 kg/m²   Body mass index is 21.77 kg/m².  Vitals:    08/14/24 0838   Weight: 57.5 kg (126 lb 12.8 oz)       Physical Exam  Vitals and nursing note reviewed.   Constitutional:       Appearance: Normal appearance.   Cardiovascular:      Rate and Rhythm: Normal rate and regular rhythm.      Heart sounds: Normal heart sounds.   Pulmonary:      Effort: Pulmonary effort is normal.      Breath sounds: Normal breath sounds.   Abdominal:      Palpations: Abdomen is soft.   Musculoskeletal:      Cervical back: Normal range of motion and neck supple.      Right lower leg: No edema.      Left lower leg: No edema.      Comments: Lumbar spine movements bilaterally is causing some discomfort and pain   Skin:     General: Skin is warm.   Neurological:      Mental Status: She is alert and " oriented to person, place, and time.   Psychiatric:         Mood and Affect: Mood normal.         Behavior: Behavior normal.         Lab Review   No visits with results within 2 Month(s) from this visit.   Latest known visit with results is:   Appointment on 04/15/2024   Component Date Value Ref Range Status    WBC 04/15/2024 4.55  4.31 - 10.16 Thousand/uL Final    RBC 04/15/2024 4.18  3.81 - 5.12 Million/uL Final    Hemoglobin 04/15/2024 13.0  11.5 - 15.4 g/dL Final    Hematocrit 04/15/2024 40.1  34.8 - 46.1 % Final    MCV 04/15/2024 96  82 - 98 fL Final    MCH 04/15/2024 31.1  26.8 - 34.3 pg Final    MCHC 04/15/2024 32.4  31.4 - 37.4 g/dL Final    RDW 04/15/2024 14.0  11.6 - 15.1 % Final    MPV 04/15/2024 9.9  8.9 - 12.7 fL Final    Platelets 04/15/2024 316  149 - 390 Thousands/uL Final    nRBC 04/15/2024 0  /100 WBCs Final    Segmented % 04/15/2024 47  43 - 75 % Final    Immature Grans % 04/15/2024 0  0 - 2 % Final    Lymphocytes % 04/15/2024 36  14 - 44 % Final    Monocytes % 04/15/2024 13 (H)  4 - 12 % Final    Eosinophils Relative 04/15/2024 3  0 - 6 % Final    Basophils Relative 04/15/2024 1  0 - 1 % Final    Absolute Neutrophils 04/15/2024 2.14  1.85 - 7.62 Thousands/µL Final    Absolute Immature Grans 04/15/2024 0.02  0.00 - 0.20 Thousand/uL Final    Absolute Lymphocytes 04/15/2024 1.62  0.60 - 4.47 Thousands/µL Final    Absolute Monocytes 04/15/2024 0.60  0.17 - 1.22 Thousand/µL Final    Eosinophils Absolute 04/15/2024 0.12  0.00 - 0.61 Thousand/µL Final    Basophils Absolute 04/15/2024 0.05  0.00 - 0.10 Thousands/µL Final    Sodium 04/15/2024 137  135 - 147 mmol/L Final    Potassium 04/15/2024 4.6  3.5 - 5.3 mmol/L Final    Chloride 04/15/2024 102  96 - 108 mmol/L Final    CO2 04/15/2024 27  21 - 32 mmol/L Final    ANION GAP 04/15/2024 8  4 - 13 mmol/L Final    BUN 04/15/2024 12  5 - 25 mg/dL Final    Creatinine 04/15/2024 0.76  0.60 - 1.30 mg/dL Final    Standardized to IDMS reference method    Glucose,  Fasting 04/15/2024 84  65 - 99 mg/dL Final    Calcium 04/15/2024 9.2  8.4 - 10.2 mg/dL Final    AST 04/15/2024 26  13 - 39 U/L Final    ALT 04/15/2024 16  7 - 52 U/L Final    Specimen collection should occur prior to Sulfasalazine administration due to the potential for falsely depressed results.     Alkaline Phosphatase 04/15/2024 72  34 - 104 U/L Final    Total Protein 04/15/2024 6.8  6.4 - 8.4 g/dL Final    Albumin 04/15/2024 4.1  3.5 - 5.0 g/dL Final    Total Bilirubin 04/15/2024 0.54  0.20 - 1.00 mg/dL Final    Use of this assay is not recommended for patients undergoing treatment with eltrombopag due to the potential for falsely elevated results.  N-acetyl-p-benzoquinone imine (metabolite of Acetaminophen) will generate erroneously low results in samples for patients that have taken an overdose of Acetaminophen.    eGFR 04/15/2024 70  ml/min/1.73sq m Final    Hemoglobin A1C 04/15/2024 5.5  Normal 4.0-5.6%; PreDiabetic 5.7-6.4%; Diabetic >=6.5%; Glycemic control for adults with diabetes <7.0% % Final    EAG 04/15/2024 111  mg/dl Final    Cholesterol 04/15/2024 142  See Comment mg/dL Final    Cholesterol:         Pediatric <18 Years        Desirable          <170 mg/dL      Borderline High    170-199 mg/dL      High               >=200 mg/dL        Adult >=18 Years            Desirable         <200 mg/dL      Borderline High   200-239 mg/dL      High              >239 mg/dL      Triglycerides 04/15/2024 167 (H)  See Comment mg/dL Final    Triglyceride:     0-9Y            <75mg/dL     10Y-17Y         <90 mg/dL       >=18Y     Normal          <150 mg/dL     Borderline High 150-199 mg/dL     High            200-499 mg/dL        Very High       >499 mg/dL    Specimen collection should occur prior to Metamizole administration due to the potential for falsely depressed results.    HDL, Direct 04/15/2024 37 (L)  >=50 mg/dL Final    LDL Calculated 04/15/2024 72  0 - 100 mg/dL Final    LDL Cholesterol:     Optimal        "    <100 mg/dl     Near Optimal      100-129 mg/dl     Above Optimal       Borderline High 130-159 mg/dl       High            160-189 mg/dl       Very High       >189 mg/dl         This screening LDL is a calculated result.   It does not have the accuracy of the Direct Measured LDL in the monitoring of patients with hyperlipidemia and/or statin therapy.   Direct Measure LDL (HAL319) must be ordered separately in these patients.    Non-HDL-Chol (CHOL-HDL) 04/15/2024 105  mg/dl Final    TSH 3RD GENERATON 04/15/2024 2.993  0.450 - 4.500 uIU/mL Final    The recommended reference ranges for TSH during pregnancy are as follows:   First trimester 0.100 to 2.500 uIU/mL   Second trimester  0.200 to 3.000 uIU/mL   Third trimester 0.300 to 3.000 uIU/m    Note: Normal ranges may not apply to patients who are transgender, non-binary, or whose legal sex, sex at birth, and gender identity differ.  Adult TSH (3rd generation) reference range follows the recommended guidelines of the [employer] Association, January, 2020.    Color, UA 04/15/2024 Colorless   Final    Clarity, UA 04/15/2024 Clear   Final    Specific Gravity, UA 04/15/2024 1.010  1.003 - 1.030 Final    pH, UA 04/15/2024 7.5  4.5, 5.0, 5.5, 6.0, 6.5, 7.0, 7.5, 8.0 Final    Leukocytes, UA 04/15/2024 Negative  Negative Final    Nitrite, UA 04/15/2024 Negative  Negative Final    Protein, UA 04/15/2024 Negative  Negative mg/dl Final    Glucose, UA 04/15/2024 Negative  Negative mg/dl Final    Ketones, UA 04/15/2024 Negative  Negative mg/dl Final    Urobilinogen, UA 04/15/2024 <2.0  <2.0 mg/dl mg/dl Final    Bilirubin, UA 04/15/2024 Negative  Negative Final    Occult Blood, UA 04/15/2024 Negative  Negative Final         Geovani Hollingsworth MD        \"This note has been constructed using a voice recognition system.Therefore there may be syntax, spelling, and/or grammatical errors. Please call if you have any questions. \"  "

## 2024-08-14 ENCOUNTER — OFFICE VISIT (OUTPATIENT)
Dept: FAMILY MEDICINE CLINIC | Facility: CLINIC | Age: 89
End: 2024-08-14
Payer: MEDICARE

## 2024-08-14 VITALS
HEIGHT: 64 IN | DIASTOLIC BLOOD PRESSURE: 72 MMHG | SYSTOLIC BLOOD PRESSURE: 118 MMHG | WEIGHT: 126.8 LBS | HEART RATE: 76 BPM | BODY MASS INDEX: 21.65 KG/M2 | OXYGEN SATURATION: 92 %

## 2024-08-14 DIAGNOSIS — M15.9 PRIMARY OSTEOARTHRITIS INVOLVING MULTIPLE JOINTS: ICD-10-CM

## 2024-08-14 DIAGNOSIS — I10 PRIMARY HYPERTENSION: Primary | ICD-10-CM

## 2024-08-14 DIAGNOSIS — T78.40XD ALLERGY, SUBSEQUENT ENCOUNTER: ICD-10-CM

## 2024-08-14 DIAGNOSIS — M81.0 AGE-RELATED OSTEOPOROSIS WITHOUT CURRENT PATHOLOGICAL FRACTURE: ICD-10-CM

## 2024-08-14 DIAGNOSIS — E78.5 DYSLIPIDEMIA: ICD-10-CM

## 2024-08-14 PROCEDURE — 99214 OFFICE O/P EST MOD 30 MIN: CPT | Performed by: INTERNAL MEDICINE

## 2024-08-14 PROCEDURE — G2211 COMPLEX E/M VISIT ADD ON: HCPCS | Performed by: INTERNAL MEDICINE

## 2024-08-14 NOTE — ASSESSMENT & PLAN NOTE
Patient lipid profile shows cholesterol at 142 triglycerides 167 HDL 37 LDL 72 done on April 15 currently taking atorvastatin 20 mg at bedtime we will continue the same follow-up with repeat lipid profile later

## 2024-08-14 NOTE — ASSESSMENT & PLAN NOTE
As mentioned before patient complains of aches and pains in the low back area hip joint area movements of the spine and hip causing some discomfort and pain recommend just take Tylenol for now as needed status post bilateral hip replacement.

## 2024-08-14 NOTE — ASSESSMENT & PLAN NOTE
Patient has osteoporosis last DEXA scan October 2023 she was refusing to go on any medications I discussed with her about the Prolia injection the last time she agreed but however she did not go for the injection also.  Recommend patient to take calcium and vitamin D continue the same.

## 2024-09-19 DIAGNOSIS — I10 PRIMARY HYPERTENSION: ICD-10-CM

## 2024-09-19 DIAGNOSIS — E78.5 DYSLIPIDEMIA: ICD-10-CM

## 2024-09-19 RX ORDER — LISINOPRIL 20 MG/1
20 TABLET ORAL DAILY
Qty: 90 TABLET | Refills: 1 | Status: SHIPPED | OUTPATIENT
Start: 2024-09-19

## 2024-09-19 RX ORDER — ATORVASTATIN CALCIUM 20 MG/1
20 TABLET, FILM COATED ORAL
Qty: 90 TABLET | Refills: 1 | Status: SHIPPED | OUTPATIENT
Start: 2024-09-19

## 2024-10-02 ENCOUNTER — APPOINTMENT (EMERGENCY)
Dept: RADIOLOGY | Facility: HOSPITAL | Age: 89
DRG: 811 | End: 2024-10-02
Payer: MEDICARE

## 2024-10-02 ENCOUNTER — APPOINTMENT (INPATIENT)
Dept: RADIOLOGY | Facility: HOSPITAL | Age: 89
DRG: 811 | End: 2024-10-02
Payer: MEDICARE

## 2024-10-02 ENCOUNTER — HOSPITAL ENCOUNTER (INPATIENT)
Facility: HOSPITAL | Age: 89
LOS: 5 days | Discharge: NURSING, REHAB OR LONG-TERM CARE | DRG: 811 | End: 2024-10-07
Attending: EMERGENCY MEDICINE | Admitting: FAMILY MEDICINE
Payer: MEDICARE

## 2024-10-02 DIAGNOSIS — S42.309A HUMERUS FRACTURE: Primary | ICD-10-CM

## 2024-10-02 DIAGNOSIS — D64.9 ANEMIA: ICD-10-CM

## 2024-10-02 DIAGNOSIS — K20.90 ESOPHAGITIS: ICD-10-CM

## 2024-10-02 DIAGNOSIS — S42.309A HUMERAL FRACTURE: ICD-10-CM

## 2024-10-02 DIAGNOSIS — R55 SYNCOPE: ICD-10-CM

## 2024-10-02 PROBLEM — S42.302A LEFT HUMERAL FRACTURE: Status: ACTIVE | Noted: 2024-10-02

## 2024-10-02 PROBLEM — D72.829 LEUKOCYTOSIS: Status: ACTIVE | Noted: 2024-10-02

## 2024-10-02 LAB
2HR DELTA HS TROPONIN: 0 NG/L
4HR DELTA HS TROPONIN: 2 NG/L
ALBUMIN SERPL BCG-MCNC: 3.7 G/DL (ref 3.5–5)
ALP SERPL-CCNC: 56 U/L (ref 34–104)
ALT SERPL W P-5'-P-CCNC: 12 U/L (ref 7–52)
ANION GAP SERPL CALCULATED.3IONS-SCNC: 9 MMOL/L (ref 4–13)
APTT PPP: 25 SECONDS (ref 23–34)
AST SERPL W P-5'-P-CCNC: 18 U/L (ref 13–39)
ATRIAL RATE: 76 BPM
ATRIAL RATE: 80 BPM
ATRIAL RATE: 80 BPM
ATRIAL RATE: 81 BPM
ATRIAL RATE: 82 BPM
BASOPHILS # BLD AUTO: 0.03 THOUSANDS/ΜL (ref 0–0.1)
BASOPHILS NFR BLD AUTO: 0 % (ref 0–1)
BILIRUB SERPL-MCNC: 0.32 MG/DL (ref 0.2–1)
BUN SERPL-MCNC: 15 MG/DL (ref 5–25)
CALCIUM SERPL-MCNC: 9.1 MG/DL (ref 8.4–10.2)
CARDIAC TROPONIN I PNL SERPL HS: 4 NG/L
CARDIAC TROPONIN I PNL SERPL HS: 4 NG/L
CARDIAC TROPONIN I PNL SERPL HS: 6 NG/L
CHLORIDE SERPL-SCNC: 102 MMOL/L (ref 96–108)
CO2 SERPL-SCNC: 21 MMOL/L (ref 21–32)
CREAT SERPL-MCNC: 0.77 MG/DL (ref 0.6–1.3)
EOSINOPHIL # BLD AUTO: 0 THOUSAND/ΜL (ref 0–0.61)
EOSINOPHIL NFR BLD AUTO: 0 % (ref 0–6)
ERYTHROCYTE [DISTWIDTH] IN BLOOD BY AUTOMATED COUNT: 15.5 % (ref 11.6–15.1)
GFR SERPL CREATININE-BSD FRML MDRD: 69 ML/MIN/1.73SQ M
GLUCOSE SERPL-MCNC: 127 MG/DL (ref 65–140)
GLUCOSE SERPL-MCNC: 128 MG/DL (ref 65–140)
GLUCOSE SERPL-MCNC: 133 MG/DL (ref 65–140)
HCT VFR BLD AUTO: 26.6 % (ref 34.8–46.1)
HGB BLD-MCNC: 8.4 G/DL (ref 11.5–15.4)
IMM GRANULOCYTES # BLD AUTO: 0.12 THOUSAND/UL (ref 0–0.2)
IMM GRANULOCYTES NFR BLD AUTO: 1 % (ref 0–2)
INR PPP: 1.04 (ref 0.85–1.19)
LYMPHOCYTES # BLD AUTO: 0.94 THOUSANDS/ΜL (ref 0.6–4.47)
LYMPHOCYTES NFR BLD AUTO: 5 % (ref 14–44)
MAGNESIUM SERPL-MCNC: 2.2 MG/DL (ref 1.9–2.7)
MCH RBC QN AUTO: 30.7 PG (ref 26.8–34.3)
MCHC RBC AUTO-ENTMCNC: 31.6 G/DL (ref 31.4–37.4)
MCV RBC AUTO: 97 FL (ref 82–98)
MONOCYTES # BLD AUTO: 0.99 THOUSAND/ΜL (ref 0.17–1.22)
MONOCYTES NFR BLD AUTO: 5 % (ref 4–12)
NEUTROPHILS # BLD AUTO: 17.95 THOUSANDS/ΜL (ref 1.85–7.62)
NEUTS SEG NFR BLD AUTO: 89 % (ref 43–75)
NRBC BLD AUTO-RTO: 0 /100 WBCS
P AXIS: 62 DEGREES
P AXIS: 63 DEGREES
P AXIS: 69 DEGREES
P AXIS: 72 DEGREES
P AXIS: 73 DEGREES
P AXIS: 79 DEGREES
PLATELET # BLD AUTO: 522 THOUSANDS/UL (ref 149–390)
PMV BLD AUTO: 7.9 FL (ref 8.9–12.7)
POTASSIUM SERPL-SCNC: 3.7 MMOL/L (ref 3.5–5.3)
PR INTERVAL: 150 MS
PR INTERVAL: 152 MS
PR INTERVAL: 152 MS
PR INTERVAL: 154 MS
PR INTERVAL: 154 MS
PR INTERVAL: 160 MS
PR INTERVAL: 160 MS
PR INTERVAL: 162 MS
PROT SERPL-MCNC: 6.3 G/DL (ref 6.4–8.4)
PROTHROMBIN TIME: 14.1 SECONDS (ref 12.3–15)
QRS AXIS: 54 DEGREES
QRS AXIS: 55 DEGREES
QRS AXIS: 68 DEGREES
QRS AXIS: 69 DEGREES
QRSD INTERVAL: 76 MS
QRSD INTERVAL: 76 MS
QRSD INTERVAL: 78 MS
QRSD INTERVAL: 78 MS
QRSD INTERVAL: 80 MS
QRSD INTERVAL: 82 MS
QRSD INTERVAL: 82 MS
QRSD INTERVAL: 84 MS
QT INTERVAL: 384 MS
QT INTERVAL: 390 MS
QT INTERVAL: 392 MS
QT INTERVAL: 396 MS
QT INTERVAL: 402 MS
QT INTERVAL: 404 MS
QT INTERVAL: 404 MS
QT INTERVAL: 414 MS
QTC INTERVAL: 448 MS
QTC INTERVAL: 454 MS
QTC INTERVAL: 455 MS
QTC INTERVAL: 457 MS
QTC INTERVAL: 462 MS
QTC INTERVAL: 463 MS
QTC INTERVAL: 465 MS
QTC INTERVAL: 480 MS
RBC # BLD AUTO: 2.74 MILLION/UL (ref 3.81–5.12)
SODIUM SERPL-SCNC: 132 MMOL/L (ref 135–147)
T WAVE AXIS: 55 DEGREES
T WAVE AXIS: 55 DEGREES
T WAVE AXIS: 63 DEGREES
T WAVE AXIS: 63 DEGREES
T WAVE AXIS: 64 DEGREES
T WAVE AXIS: 65 DEGREES
T WAVE AXIS: 67 DEGREES
T WAVE AXIS: 68 DEGREES
VENTRICULAR RATE: 76 BPM
VENTRICULAR RATE: 80 BPM
VENTRICULAR RATE: 80 BPM
VENTRICULAR RATE: 81 BPM
VENTRICULAR RATE: 82 BPM
WBC # BLD AUTO: 20.03 THOUSAND/UL (ref 4.31–10.16)

## 2024-10-02 PROCEDURE — 83550 IRON BINDING TEST: CPT

## 2024-10-02 PROCEDURE — 85610 PROTHROMBIN TIME: CPT | Performed by: EMERGENCY MEDICINE

## 2024-10-02 PROCEDURE — 97163 PT EVAL HIGH COMPLEX 45 MIN: CPT

## 2024-10-02 PROCEDURE — 83540 ASSAY OF IRON: CPT

## 2024-10-02 PROCEDURE — 2W3BXYZ IMMOBILIZATION OF LEFT UPPER ARM USING OTHER DEVICE: ICD-10-PCS | Performed by: EMERGENCY MEDICINE

## 2024-10-02 PROCEDURE — 82746 ASSAY OF FOLIC ACID SERUM: CPT

## 2024-10-02 PROCEDURE — 36415 COLL VENOUS BLD VENIPUNCTURE: CPT | Performed by: EMERGENCY MEDICINE

## 2024-10-02 PROCEDURE — 82607 VITAMIN B-12: CPT

## 2024-10-02 PROCEDURE — 97167 OT EVAL HIGH COMPLEX 60 MIN: CPT

## 2024-10-02 PROCEDURE — 99284 EMERGENCY DEPT VISIT MOD MDM: CPT

## 2024-10-02 PROCEDURE — 93010 ELECTROCARDIOGRAM REPORT: CPT | Performed by: INTERNAL MEDICINE

## 2024-10-02 PROCEDURE — 99223 1ST HOSP IP/OBS HIGH 75: CPT | Performed by: FAMILY MEDICINE

## 2024-10-02 PROCEDURE — 84484 ASSAY OF TROPONIN QUANT: CPT | Performed by: EMERGENCY MEDICINE

## 2024-10-02 PROCEDURE — 82948 REAGENT STRIP/BLOOD GLUCOSE: CPT

## 2024-10-02 PROCEDURE — 99232 SBSQ HOSP IP/OBS MODERATE 35: CPT | Performed by: NURSE PRACTITIONER

## 2024-10-02 PROCEDURE — 71045 X-RAY EXAM CHEST 1 VIEW: CPT

## 2024-10-02 PROCEDURE — 85025 COMPLETE CBC W/AUTO DIFF WBC: CPT | Performed by: EMERGENCY MEDICINE

## 2024-10-02 PROCEDURE — 83735 ASSAY OF MAGNESIUM: CPT | Performed by: EMERGENCY MEDICINE

## 2024-10-02 PROCEDURE — 93005 ELECTROCARDIOGRAM TRACING: CPT

## 2024-10-02 PROCEDURE — 73060 X-RAY EXAM OF HUMERUS: CPT

## 2024-10-02 PROCEDURE — 99285 EMERGENCY DEPT VISIT HI MDM: CPT | Performed by: EMERGENCY MEDICINE

## 2024-10-02 PROCEDURE — 85730 THROMBOPLASTIN TIME PARTIAL: CPT | Performed by: EMERGENCY MEDICINE

## 2024-10-02 PROCEDURE — 96360 HYDRATION IV INFUSION INIT: CPT

## 2024-10-02 PROCEDURE — 80053 COMPREHEN METABOLIC PANEL: CPT | Performed by: EMERGENCY MEDICINE

## 2024-10-02 PROCEDURE — 82728 ASSAY OF FERRITIN: CPT

## 2024-10-02 RX ORDER — LISINOPRIL 20 MG/1
20 TABLET ORAL DAILY
Status: DISCONTINUED | OUTPATIENT
Start: 2024-10-03 | End: 2024-10-07 | Stop reason: HOSPADM

## 2024-10-02 RX ORDER — PANTOPRAZOLE SODIUM 40 MG/10ML
40 INJECTION, POWDER, LYOPHILIZED, FOR SOLUTION INTRAVENOUS
Status: DISCONTINUED | OUTPATIENT
Start: 2024-10-03 | End: 2024-10-03

## 2024-10-02 RX ORDER — BIMATOPROST 0.3 MG/ML
1 SOLUTION/ DROPS OPHTHALMIC DAILY
Status: DISCONTINUED | OUTPATIENT
Start: 2024-10-02 | End: 2024-10-07 | Stop reason: HOSPADM

## 2024-10-02 RX ORDER — FLUTICASONE PROPIONATE 50 MCG
1 SPRAY, SUSPENSION (ML) NASAL 2 TIMES DAILY
Status: DISCONTINUED | OUTPATIENT
Start: 2024-10-02 | End: 2024-10-07 | Stop reason: HOSPADM

## 2024-10-02 RX ORDER — ATORVASTATIN CALCIUM 20 MG/1
20 TABLET, FILM COATED ORAL
Status: DISCONTINUED | OUTPATIENT
Start: 2024-10-02 | End: 2024-10-07 | Stop reason: HOSPADM

## 2024-10-02 RX ORDER — ACETAMINOPHEN 325 MG/1
975 TABLET ORAL EVERY 8 HOURS SCHEDULED
Status: DISCONTINUED | OUTPATIENT
Start: 2024-10-02 | End: 2024-10-07 | Stop reason: HOSPADM

## 2024-10-02 RX ADMIN — FLUTICASONE PROPIONATE 1 SPRAY: 50 SPRAY, METERED NASAL at 22:12

## 2024-10-02 RX ADMIN — ACETAMINOPHEN 975 MG: 325 TABLET ORAL at 22:11

## 2024-10-02 RX ADMIN — SODIUM CHLORIDE 500 ML: 0.9 INJECTION, SOLUTION INTRAVENOUS at 15:07

## 2024-10-02 RX ADMIN — ATORVASTATIN CALCIUM 20 MG: 20 TABLET, FILM COATED ORAL at 22:12

## 2024-10-02 NOTE — ED PROVIDER NOTES
Final diagnoses:   Humerus fracture   Syncope   Humeral fracture     ED Disposition       ED Disposition   Admit    Condition   Stable    Date/Time   Wed Oct 2, 2024  4:06 PM    Comment   Case was discussed with july and the patient's admission status was agreed to be Admission Status: inpatient status to the service of Dr. Arcos .               Assessment & Plan       Medical Decision Making  89-year-old female states she landing on her left arm and shoulder.  Complaining of pain just below the shoulder on the humerus.    Patient was placed in a sling for the left humeral fracture and was getting up with assistance with PT OT to go to the bathroom and she had a syncopal episode.  Patient is awake and alert now seems to be doing okay however I advised her at this point she needs to stay in the hospital she is agreeable.    Amount and/or Complexity of Data Reviewed  Labs: ordered.  Radiology: ordered.             Medications       ED Risk Strat Scores                                               History of Present Illness       Chief Complaint   Patient presents with    Fall    Arm Pain     Pt fell off chair now has l arm/ l shoulder pain. No loc, pt denies head strike       Past Medical History:   Diagnosis Date    Arthritis     GERD (gastroesophageal reflux disease)     Hyperlipidemia     Hypertension     Osteoporosis     Urinary tract infection       Past Surgical History:   Procedure Laterality Date    CATARACT EXTRACTION      COLONOSCOPY N/A 3/2/2017    Procedure: COLONOSCOPY;  Surgeon: Pablo John MD;  Location: [facility] GI LAB;  Service:     JOINT REPLACEMENT Bilateral 2011, 2012    hips    REFRACTIVE SURGERY Right       No family history on file.   Social History     Tobacco Use    Smoking status: Never     Passive exposure: Never    Smokeless tobacco: Never   Vaping Use    Vaping status: Never Used   Substance Use Topics    Alcohol use: No     Comment: on holidays    Drug use: No      E-Cigarette/Vaping     E-Cigarette Use Never User       E-Cigarette/Vaping Substances    Nicotine No     THC No     CBD No     Flavoring No     Other No     Unknown No       I have reviewed and agree with the history as documented.     89-year-old female presents after tripping and falling at home does have some tenderness in the proximal humerus on the left arm.  No other complaints states she did not strike her head  And is not on any anticoagulants.  Patient does not complain of any other injuries or issues at this point.        Review of Systems   Constitutional:  Negative for activity change, chills, diaphoresis and fever.   HENT:  Negative for congestion, ear pain, nosebleeds, sore throat, trouble swallowing and voice change.    Eyes:  Negative for pain, discharge and redness.   Respiratory:  Negative for apnea, cough, choking, shortness of breath, wheezing and stridor.    Cardiovascular:  Negative for chest pain and palpitations.   Gastrointestinal:  Negative for abdominal distention, abdominal pain, constipation, diarrhea, nausea and vomiting.   Endocrine: Negative for polydipsia.   Genitourinary:  Negative for difficulty urinating, dysuria, flank pain, frequency, hematuria and urgency.   Musculoskeletal:  Positive for arthralgias and joint swelling. Negative for back pain, gait problem, myalgias, neck pain and neck stiffness.   Skin:  Negative for pallor and rash.   Neurological:  Negative for dizziness, tremors, syncope, speech difficulty, weakness, numbness and headaches.   Hematological:  Negative for adenopathy.   Psychiatric/Behavioral:  Negative for confusion, hallucinations, self-injury and suicidal ideas. The patient is not nervous/anxious.            Objective       ED Triage Vitals   Temperature Pulse Blood Pressure Respirations SpO2 Patient Position - Orthostatic VS   10/02/24 1145 10/02/24 1145 10/02/24 1147 10/02/24 1145 10/02/24 1145 10/02/24 1145   97.7 °F (36.5 °C) 70 167/74 18 93 % Lying      Temp Source Heart  "Rate Source BP Location FiO2 (%) Pain Score    10/02/24 1145 10/02/24 1145 10/02/24 1145 -- 10/02/24 1145    Oral Monitor Left arm  2      Vitals      Date and Time Temp Pulse SpO2 Resp BP Pain Score FACES Pain Rating User   10/02/24 1530 -- 80 94 % 20 157/67 -- -- NH   10/02/24 1510 -- -- -- -- -- No Pain -- KW   10/02/24 1500 -- -- -- -- -- No Pain upon therapist arrival supine on stretcher w/ HOB elevated. \"I am not moving\" -- CJ   10/02/24 1147 -- -- -- -- 167/74 -- -- GC   10/02/24 1145 97.7 °F (36.5 °C) 70 93 % 18 -- 2 -- GC            Physical Exam  Vitals and nursing note reviewed.   Constitutional:       General: She is not in acute distress.     Appearance: She is well-developed. She is not diaphoretic.   HENT:      Head: Normocephalic and atraumatic.      Right Ear: External ear normal.      Left Ear: External ear normal.      Nose: Nose normal.   Eyes:      Conjunctiva/sclera: Conjunctivae normal.      Pupils: Pupils are equal, round, and reactive to light.   Cardiovascular:      Rate and Rhythm: Normal rate and regular rhythm.      Heart sounds: Normal heart sounds.   Pulmonary:      Effort: Pulmonary effort is normal.      Breath sounds: Normal breath sounds.   Abdominal:      General: Bowel sounds are normal.      Palpations: Abdomen is soft.      Tenderness: There is no abdominal tenderness.   Musculoskeletal:         General: Tenderness and signs of injury present. Normal range of motion.      Cervical back: Normal range of motion and neck supple.      Comments: Tenderness with pain just below the humeral head on the left arm.   Skin:     General: Skin is warm and dry.   Neurological:      Mental Status: She is alert and oriented to person, place, and time.      Deep Tendon Reflexes: Reflexes are normal and symmetric.   Psychiatric:         Behavior: Behavior is cooperative.         Results Reviewed       Procedure Component Value Units Date/Time    HS Troponin I 4hr [[id number]]     Lab Status: No " result Specimen: Blood     HS Troponin I 2hr [[id number]]     Lab Status: No result Specimen: Blood     HS Troponin 0hr (reflex protocol) [[id number]]  (Normal) Collected: 10/02/24 1507    Lab Status: Final result Specimen: Blood from Arm, Right Updated: 10/02/24 1555     hs TnI 0hr 4 ng/L     Comprehensive metabolic panel [[id number]]  (Abnormal) Collected: 10/02/24 1507    Lab Status: Final result Specimen: Blood from Arm, Right Updated: 10/02/24 1547     Sodium 132 mmol/L      Potassium 3.7 mmol/L      Chloride 102 mmol/L      CO2 21 mmol/L      ANION GAP 9 mmol/L      BUN 15 mg/dL      Creatinine 0.77 mg/dL      Glucose 128 mg/dL      Calcium 9.1 mg/dL      AST 18 U/L      ALT 12 U/L      Alkaline Phosphatase 56 U/L      Total Protein 6.3 g/dL      Albumin 3.7 g/dL      Total Bilirubin 0.32 mg/dL      eGFR 69 ml/min/1.73sq m     Narrative:      National Kidney Disease Foundation guidelines for Chronic Kidney Disease (CKD):     Stage 1 with normal or high GFR (GFR > 90 mL/min/1.73 square meters)    Stage 2 Mild CKD (GFR = 60-89 mL/min/1.73 square meters)    Stage 3A Moderate CKD (GFR = 45-59 mL/min/1.73 square meters)    Stage 3B Moderate CKD (GFR = 30-44 mL/min/1.73 square meters)    Stage 4 Severe CKD (GFR = 15-29 mL/min/1.73 square meters)    Stage 5 End Stage CKD (GFR <15 mL/min/1.73 square meters)  Note: GFR calculation is accurate only with a steady state creatinine    Magnesium [[id number]]  (Normal) Collected: 10/02/24 1507    Lab Status: Final result Specimen: Blood from Arm, Right Updated: 10/02/24 1547     Magnesium 2.2 mg/dL     Protime-INR [[id number]]  (Normal) Collected: 10/02/24 1507    Lab Status: Final result Specimen: Blood from Arm, Right Updated: 10/02/24 1544     Protime 14.1 seconds      INR 1.04    Narrative:      INR Therapeutic Range    Indication                                             INR Range      Atrial Fibrillation                                                2.0-3.0  Hypercoagulable State                                    2.0.2.3  Left Ventricular Asist Device                            2.0-3.0  Mechanical Heart Valve                                  -    Aortic(with afib, MI, embolism, HF, LA enlargement,    and/or coagulopathy)                                     2.0-3.0 (2.5-3.5)     Mitral                                                             2.5-3.5  Prosthetic/Bioprosthetic Heart Valve               2.0-3.0  Venous thromboembolism (VTE: VT, PE        2.0-3.0    APTT [[id number]]  (Normal) Collected: 10/02/24 1507    Lab Status: Final result Specimen: Blood from Arm, Right Updated: 10/02/24 1544     PTT 25 seconds     CBC and differential [[id number]]  (Abnormal) Collected: 10/02/24 1507    Lab Status: Final result Specimen: Blood from Arm, Right Updated: 10/02/24 1526     WBC 20.03 Thousand/uL      RBC 2.74 Million/uL      Hemoglobin 8.4 g/dL      Hematocrit 26.6 %      MCV 97 fL      MCH 30.7 pg      MCHC 31.6 g/dL      RDW 15.5 %      MPV 7.9 fL      Platelets 522 Thousands/uL      nRBC 0 /100 WBCs      Segmented % 89 %      Immature Grans % 1 %      Lymphocytes % 5 %      Monocytes % 5 %      Eosinophils Relative 0 %      Basophils Relative 0 %      Absolute Neutrophils 17.95 Thousands/µL      Absolute Immature Grans 0.12 Thousand/uL      Absolute Lymphocytes 0.94 Thousands/µL      Absolute Monocytes 0.99 Thousand/µL      Eosinophils Absolute 0.00 Thousand/µL      Basophils Absolute 0.03 Thousands/µL     Fingerstick Glucose (POCT) [[id number]]  (Normal) Collected: 10/02/24 1505    Lab Status: Final result Specimen: Blood Updated: 10/02/24 1506     POC Glucose 127 mg/dl             XR humerus LEFT   Final Interpretation by Anthony Ramos MD (10/02 1320)      Osteopenia. Comminuted and angulated fracture of the left humeral proximal metaphysis. No other fractures.      As per comments in electronic health record, findings are concordant with preliminary  interpretation provided by the emergency room physician.      Computerized Assisted Algorithm (CAA) may have been used to analyze all applicable images.         Workstation performed: [id number]             Procedures    ED Medication and Procedure Management   Prior to Admission Medications   Prescriptions Last Dose Informant Patient Reported? Taking?   Cholecalciferol (Vitamin D) 50 MCG ( UT) CAPS   Yes No   Sig: Take by mouth   aspirin 81 MG tablet   Yes No   Sig: Take 81 mg by mouth daily   atorvastatin (LIPITOR) 20 mg tablet   No No   Sig: TAKE 1 TABLET BY MOUTH DAILY AT  BEDTIME   bimatoprost (LUMIGAN) 0.01 % ophthalmic drops   Yes No   Si drop daily at bedtime   calcium carbonate (OS-MACRINA) 600 MG tablet   Yes No   Sig: Take 600 mg by mouth 2 (two) times a day with meals   fluticasone (FLONASE) 50 mcg/act nasal spray   No No   Si spray into each nostril 2 (two) times a day   lisinopril (ZESTRIL) 20 mg tablet   No No   Sig: TAKE 1 TABLET BY MOUTH DAILY   multivitamin (THERAGRAN) TABS   Yes No   Sig: Take 1 tablet by mouth daily      Facility-Administered Medications: None     Patient's Medications   Discharge Prescriptions    No medications on file     No discharge procedures on file.  ED SEPSIS DOCUMENTATION   Time reflects when diagnosis was documented in both MDM as applicable and the Disposition within this note       Time User Action Codes Description Comment    10/2/2024  1:11 PM Donald Hicks [S42.309A] Humerus fracture     10/2/2024  4:06 PM Donald Hicks [R55] Syncope     10/2/2024  4:07 PM Donald Hicks [S42.309A] Humeral fracture                  Donald Hicks DO  10/02/24 1608

## 2024-10-02 NOTE — ED NOTES
Pt was being eval by PT/OT eval. Pt passed out while being eval by physical therapy. Pt taken to room 10, hooked up to monitor. Pt then became alert and started answering questions     Karla George RN  10/02/24 [id number]

## 2024-10-02 NOTE — OCCUPATIONAL THERAPY NOTE
"    Occupational Therapy Evaluation     Patient Name: Pattie Manning  Today's Date: 10/2/2024  Problem List  Active Problems:  There are no active Hospital Problems.    Past Medical History  Past Medical History:   Diagnosis Date    Arthritis     GERD (gastroesophageal reflux disease)     Hyperlipidemia     Hypertension     Osteoporosis     Urinary tract infection      Past Surgical History  Past Surgical History:   Procedure Laterality Date    CATARACT EXTRACTION      COLONOSCOPY N/A 3/2/2017    Procedure: COLONOSCOPY;  Surgeon: Pablo John MD;  Location: [facility] GI LAB;  Service:     JOINT REPLACEMENT Bilateral 2011, 2012    hips    REFRACTIVE SURGERY Right            10/02/24 1500   OT Last Visit   OT Visit Date 10/02/24  (Wednesday)   Note Type   Note type Evaluation  (Eval [id number])   Additional Comments Idenified pt by full name and birthdate   Pain Assessment   Pain Assessment Tool 0-10   Pain Score No Pain  (upon therapist arrival supine on stretcher w/ HOB elevated. \"I am not moving\")   Effect of Pain on Daily Activities limits pace and activity tolerance during ADLs   Hospital Pain Intervention(s) Repositioned;Ambulation/increased activity;Emotional support   Restrictions/Precautions   Weight Bearing Precautions Per Order Yes   LUE Weight Bearing Per Order NWB   Braces or Orthoses Sling;Other (Comment)  (provided w/ L UE sling)   Other Precautions WBS;Fall Risk;Pain;Hard of hearing;Visual impairment  (recommend bed / chair alarm)   Home Living   Type of Home House   Home Layout One level;Performs ADLs on one level;Able to live on main level with bedroom/bathroom;Other (Comment)  (no MATT)   Bathroom Shower/Tub Tub/shower unit   Bathroom Toilet Standard   Bathroom Accessibility Accessible   Home Equipment Walker;Cane  (not using prior to admission)   Additional Comments Pt reports living alone in 1 SH w/ no MATT.   Prior Function   Level of [place] Independent with ADLs;Independent with functional " mobility;Needs assistance with IADLS  (does not drive to visual impairment)   Lives With Alone   Receives Help From Neighbor;Friend(s)  (supportive neighbor lives .7 miles away)   IADLs Independent with meal prep;Independent with medication management  (supportive neighbor assists w/ community mobility, transportation)   Falls in the last 6 months 1 to 4   Vocational Retired   Comments Pt reports I w/ ADLs at baseline w/ out use of AD. Cuts her own grass.   Lifestyle   Autonomy Pt reports I w/ ADLs at baseline w/ out use of AD   Reciprocal Relationships Supportive neighbor assist w/ IADLs, community mobility   Intrinsic Gratification Pt enjoys cutting her grass. Pt reports she does everything at home   General   Additional Pertinent History Pt admitted to ED at [facility] on 10/2/24 following a fall from home. Diagnosed w/ L humerus fracture and is NWB L UE in sling per Dr. Kurt MEDINA   Family/Caregiver Present Yes  (friend / neighbor present)   ADL   Where Assessed   (edge of stretcher)   Eating Assistance 6  Modified independent   Eating Deficit Setup;Increased time to complete   Grooming Assistance 4  Minimal Assistance   Grooming Deficit Setup;Verbal cueing;Supervision/safety;Increased time to complete  (seated w/ + time due to L UE NWB in sling)   UB Bathing Assistance Unable to assess   LB Bathing Assistance Unable to assess   UB Dressing Assistance 3  Moderate Assistance   UB Dressing Deficit Setup;Thread LUE;Pull around back;Fasteners  (don L UE sling upon sitting at EOB)   LB Dressing Assistance Unable to assess  (anticipate mod A excluding fasteners based on fxal obs skills, clinical judgement)   Toileting Assistance  Unable to assess  (denied need to void)   Additional Comments (S)  While engaged in functional mobility to bathroom using cane, pt reported that she needed to sit down. Pt became unresponsive, and required total A to complete approach to transport chair. Pt assisted to stretcher in ED 10 w/ total  A x3. MD/ RN/ PCA aware and it assess pt.   Bed Mobility   Supine to Sit 4  Minimal assistance   Additional items Assist x 1;Increased time required;Verbal cues;LE management  (to pt's L)   Sit to Supine Unable to assess   Additional Comments Pt supine on stretcher post eval w/ RN/MD present   Transfers   Sit to Stand 4  Minimal assistance   Additional items Assist x 1;Increased time required;Verbal cues   Stand to Sit 4  Minimal assistance   Additional items Assist x 1;Increased time required;Verbal cues   Functional Mobility   Functional Mobility 4  Minimal assistance   Additional Comments engaged in functional mobility household distances using cane. Pt unable to take steps, walk w/ out use of AD   Additional items SPC   Balance   Static Sitting Fair +   Static Standing Fair -   Ambulatory Poor +   Activity Tolerance   Activity Tolerance Patient limited by fatigue;Patient limited by pain;Treatment limited secondary to medical complications (Comment);Other (Comment)  (pt became unresponsive while ambulating in [place])   Medical Staff Made Aware care coordination w/ PTTamara and spoke w/ Dr. Kurt MEDINA   Nurse Made Aware spoke w/ RN   RUE Assessment   RUE Assessment WFL   RUE Strength   RUE Overall Strength Within Functional Limits - able to perform ADL tasks with strength   LUE Assessment   LUE Assessment X   LUE Overall AROM   L Mass Grasp AROM WFL   Hand Function   Gross Motor Coordination Functional   Fine Motor Coordination Impaired   Hand Function Comments Pt reports R hand dominance. Demonstrated functional use of R UE. Benefits from cues to maintain L UE NWB   Vision-Basic Assessment   Visual History   (premorbide visual impairment)   Cognition   Overall Cognitive Status WFL   Arousal/Participation Alert;Cooperative   Attention Attends with cues to redirect  (+ Robinson)   Orientation Level Oriented X4   Memory Within functional limits   Following Commands Follows multistep commands with increased time or  repetition   Comments Alert, generally oriented upon therapist arrival. Able to follow directions during ADLs w/ + time, cues due to hard of hearing.   Assessment   Limitation Decreased ADL status;Decreased UE ROM;Decreased UE strength;Decreased endurance;Decreased self-care trans;Decreased high-level ADLs  (impaired pain, activity tolerance, standing tolerance, balance, L UE ROM / strength, forward functional reach)   Assessment Pt is an 89yo female presented to ED at [facility] following a fall from home. L humerus/ UE x-ray impression: Osteopenia. Comminuted and angulated fracture of the left humeral proximal metaphysis. No other fractures. Pt reports living alone in 1  and I w/ ADLs at baseline w/ out use of AD. Significant PMH impacting her occupational performance osteoarthritis, hx UTI, B ISA (2011, 2012), visual impairment L eye. Personal and environmental factors supporting performance includes independent at baseline, access to AD; barriers include lives alone, limited support / assist, advanced age. Upon eval, pt alert and able to follow directions w/ + time due to hard of hearing. Pt required min A grooming, mod A UBD, min A bed mobility from stretcher, min A sit <> stand, min A using cane for functional mobility. Pt benefit from cues to maintain L UE NWB. While ambulating in [place], pt became unresponsive. Pt is completing ADLs below baseline level of I and would benefit from OT in acute care. Recommend level II rehab resource intensity when medically stable for discharge from acute care. Will continue to follow   Goals   Patient Goals Pt stated that she would like to return home   LTG Time Frame 7-10   Long Term Goal #1 see below   Plan   Treatment Interventions ADL retraining;Functional transfer training;UE strengthening/ROM;Endurance training;Patient/family training;Equipment evaluation/education;Compensatory technique education;Continued evaluation;Activityengagement;Energy conservation   Goal Expiration  Date 10/09/24   OT Treatment Day 0  (Wed 10/2/24)   OT Frequency 3-5x/wk   Discharge Recommendation   Rehab Resource Intensity Level, OT II (Moderate Resource Intensity)   AM-PAC Daily Activity Inpatient   Lower Body Dressing 2   Bathing 2   Toileting 3   Upper Body Dressing 2   Grooming 3   Eating 4   Daily Activity Raw Score 16   Daily Activity Standardized Score (Calc for Raw Score >=11) 35.96   AM-PAC Applied Cognition Inpatient   Following a Speech/Presentation 3   Understanding Ordinary Conversation 4   Taking Medications 3   Remembering Where Things Are Placed or Put Away 4   Remembering List of 4-5 Errands 4   Taking Care of Complicated Tasks 3   Applied Cognition Raw Score 21   Applied Cognition Standardized Score 44.3   Barthel Index   Feeding 5   Bathing 0   Grooming Score 0   Dressing Score 5   Bladder Score 10   Bowels Score 10   Toilet Use Score 5   Transfers (Bed/Chair) Score 10   Mobility (Level Surface) Score 0   Stairs Score 0   Barthel Index Score 45   End of Consult   Education Provided Yes   Patient Position at End of Consult Supine;All needs within reach   Nurse Communication Nurse aware of consult   Licensure   [place] License Number  Taina Peñaloza, OTR/L [id number]          The patient's raw score on the AM-PAC Daily Activity Inpatient Short Form is 16. A raw score of less than 19 suggests the patient may benefit from discharge to post-acute rehabilitation services. Please refer to the recommendation of the Occupational Therapist for safe discharge planning.      Pt goals to be met by 10/9/24 to max I w/ ADLs, improve engagement to return home alone includes:    -Pt will complete bed mobility supine <> sit w/ mod I for + time in preparation for ADLs    -Pt will complete functional transfers to bed, chair and toilet using LRAD, DME as needed w/ mod I to minimize burden of care    -Pt will consistently demonstrate good recall, understanding L UE NWB to max I w/ ADLs    -Pt will complete UBD  w/ mod I for + time    -Pt will complete LBD w/ S     -Pt will consistently engage in functional mobility using LRAD household distances w/ mod I    -Pt will demonstrate improved AMPAC score to at least 20 to max I w/ ADLs, assist in DC planning    -Pt will demonstrate improved functional standing tolerance for at least 15 minutes using LRAD as needed w/ at least fair balance while engaged in grooming w/ mod I in stance to max I w/ light IADLs    Taina Peñaloza, OTR/L  [id number]  [id number]

## 2024-10-02 NOTE — ASSESSMENT & PLAN NOTE
Hemoglobin 8.4 on admission, baseline appears around 11-13 with most recent hemoglobin 13.0 in April  She does report stools have been black recently  Hold home aspirin  Check iron panel, B12, folate  Start IV protonix  GI consulted  Repeat cbc in am

## 2024-10-02 NOTE — ASSESSMENT & PLAN NOTE
Possibly reactive in setting of fall and acute fracture  Patient is afebrile and hemodynamically stable  Follow up UA and CXR  Monitor cbc

## 2024-10-02 NOTE — PLAN OF CARE
Problem: OCCUPATIONAL THERAPY ADULT  Goal: Performs self-care activities at highest level of function for planned discharge setting.  See evaluation for individualized goals.  Description: Treatment Interventions: ADL retraining, Functional transfer training, UE strengthening/ROM, Endurance training, Patient/family training, Equipment evaluation/education, Compensatory technique education, Continued evaluation, Activityengagement, Energy conservation          See flowsheet documentation for full assessment, interventions and recommendations.   Note: Limitation: Decreased ADL status, Decreased UE ROM, Decreased UE strength, Decreased endurance, Decreased self-care trans, Decreased high-level ADLs (impaired pain, activity tolerance, standing tolerance, balance, L UE ROM / strength, forward functional reach)     Assessment: Pt is an 88yo female presented to ED at [facility] following a fall from home. L humerus/ UE x-ray impression: Osteopenia. Comminuted and angulated fracture of the left humeral proximal metaphysis. No other fractures. Pt reports living alone in 1  and I w/ ADLs at baseline w/ out use of AD. Significant PMH impacting her occupational performance osteoarthritis, hx UTI, B ISA (2011, 2012), visual impairment L eye. Personal and environmental factors supporting performance includes independent at baseline, access to AD; barriers include lives alone, limited support / assist, advanced age. Upon eval, pt alert and able to follow directions w/ + time due to hard of hearing. Pt required min A grooming, mod A UBD, min A bed mobility from stretcher, min A sit <> stand, min A using cane for functional mobility. Pt benefit from cues to maintain L UE NWB. While ambulating in [place], pt became unresponsive. Pt is completing ADLs below baseline level of I and would benefit from OT in acute care. Recommend level II rehab resource intensity when medically stable for discharge from acute care. Will continue to follow      Rehab Resource Intensity Level, OT: II (Moderate Resource Intensity)

## 2024-10-02 NOTE — PLAN OF CARE
Problem: PHYSICAL THERAPY ADULT  Goal: Performs mobility at highest level of function for planned discharge setting.  See evaluation for individualized goals.  Description: Treatment/Interventions: ADL retraining, LE strengthening/ROM, Functional transfer training, Therapeutic exercise, Endurance training, Bed mobility, Gait training, Spoke to nursing, OT, Spoke to case management          See flowsheet documentation for full assessment, interventions and recommendations.  Note: Prognosis: Good  Problem List: Decreased strength, Decreased endurance, Impaired balance, Decreased mobility, Impaired judgement, Decreased safety awareness, Pain, Orthopedic restrictions  Assessment: Patient seen for Physical Therapy evaluation. Patient admitted with s/p fall, L humeral fracture. Comorbidities affecting patient's physical performance include: falls, hypertension, hyperlipidemia, and osteoporosis.  Personal factors affecting patient at time of initial evaluation include: lives in 1 story house, ambulating with assistive device, inability to navigate community distances, inability to navigate level surfaces without external assistance, limited home support, positive fall history, inability to perform ADLS, inability to perform IADLS , and inability to live alone. Prior to admission, patient was independent with functional mobility without assistive device, independent with ADLS, living alone in 1 story home with 0 steps to enter, ambulating household distance, and ambulating community distances.  Please find objective findings from Physical Therapy assessment regarding body systems outlined above with impairments and limitations including impaired balance, decreased endurance, gait deviations, pain, decreased activity tolerance, decreased functional mobility tolerance, decreased safety awareness, impaired judgement, fall risk, and orthopedic restrictions.  The Barthel Index was used as a functional outcome tool presenting  with a score of Barthel Index Score: 45 today indicating marked limitations of functional mobility and ADLS.  Patient's clinical presentation is currently unstable/unpredictable as seen in patient's presentation of vital sign response, increased fall risk, new onset of impairment of functional mobility, and decreased endurance. Pt would benefit from continued Physical Therapy treatment to address deficits as defined above and maximize level of functional mobility. As demonstrated by objective findings, the assigned level of complexity for this evaluation is high.The patient's AM-[facility] Basic Mobility Inpatient Short Form Raw Score is 15. A Raw score of less than or equal to 16 suggests the patient may benefit from discharge to post-acute rehabilitation services. Please also refer to the recommendation of the Physical Therapist for safe discharge planning.        Rehab Resource Intensity Level, PT: II (Moderate Resource Intensity)    See flowsheet documentation for full assessment.

## 2024-10-02 NOTE — ASSESSMENT & PLAN NOTE
Presented to ED with left arm pain after fall at home  XR L humerus shows fracture of left humeral proximal metaphysis  Continue supportive care with LUE sling  Scheduled tylenol  PT/OT  Fall precautions

## 2024-10-02 NOTE — ASSESSMENT & PLAN NOTE
While working with OT in the ED, patient said she needed to sit down and reportedly became unresponsive and was assisted to chair  EKG shows NSR with PACs HR 82  Troponin negative  Check orthostatic vitals  Monitor on telemetry  Check ECHO

## 2024-10-02 NOTE — PHYSICAL THERAPY NOTE
"   PT EVALUATION       10/02/24 1510   PT Last Visit   PT Visit Date 10/02/24   Note Type   Note type Evaluation   Pain Assessment   Pain Assessment Tool 0-10   Pain Score No Pain   Patient's Stated Pain Goal No pain   Multiple Pain Sites No   Restrictions/Precautions   Weight Bearing Precautions Per Order Yes   LLE Weight Bearing Per Order NWB   Other Precautions Bed Alarm;Chair Alarm;Fall Risk;Pain;WBS   Home Living   Type of Home House   Home Layout One level;Performs ADLs on one level;Able to live on main level with bedroom/bathroom  (no MATT)   Home Equipment Walker;Cane   Additional Comments Pt reports she has DME from prior hip surgeries ; typically IND at home without DME   Prior Function   Level of [place] Independent with ADLs;Independent with functional mobility;Independent with IADLS  (Performs all IADLs herself ; does not drive)   Lives With Alone   Receives Help From Neighbor;Family   IADLs Independent with meal prep;Independent with medication management;Family/Friend/Other provides transportation   Falls in the last 6 months 1 to 4  (1 fall leading to current admission to ED)   Vocational Retired   General   Additional Pertinent History Pt is an 89 year-old female who presents to the ED today (10/2/24) due to fall off chair at home ; Xray of L arm revealed + Osteopenia. Comminuted and angulated fracture of the left humeral proximal metaphysis. No other fractures   Family/Caregiver Present Yes  (neighbor present during session)   Cognition   Overall Cognitive Status WFL   Arousal/Participation Cooperative   Orientation Level Oriented X4   Following Commands Follows multistep commands with increased time or repetition   Subjective   Subjective \"it doesn't hurt when i'm not moving\"   RLE Assessment   RLE Assessment WFL   LLE Assessment   LLE Assessment WFL   Bed Mobility   Supine to Sit 4  Minimal assistance   Additional items Assist x 1;Verbal cues;Increased time required;HOB elevated   Sit to " Supine 1  Dependent  (Pt with syncopal episode after ambulating ; transferred to transport chair and then dependently moved from chair > bed ; improved mentation/able to verbalize that she didn't feel right once supine in bed : RN + [facility]   Transfers   Sit to Stand 4  Minimal assistance   Additional items Assist x 1;Verbal cues   Stand to Sit 3  Moderate assistance  (Pt reports feeling like she needs to sit down + difficulty moving BLE ; Mod-max A to sit in transport chair next to patient + dependently transferred to bed)   Additional items Assist x 1;Assist x 2;Verbal cues   Ambulation/Elevation   Gait pattern Foward flexed;Short stride;Step through pattern  (decreased gait speed ; intermittent unsteadiness)   Gait Assistance 4  Minimal assist   Additional items Assist x 1;Verbal cues   Assistive Device Straight cane   Distance 50 feet   Stair Management Assistance Not tested   Balance   Static Sitting Fair   Dynamic Sitting Fair -   Static Standing Fair -   Dynamic Standing Poor +   Ambulatory Poor +  (cane)   Activity Tolerance   Activity Tolerance Patient limited by fatigue;Patient limited by pain  (Syncopal episode when ambulating)   Medical Staff Made Aware Dr. Hicks aware ; care coordinated savanah Her due to regression from baseline, decreased activity tolerance + assist with discharge planning from ED   Nurse Made Aware yes   Assessment   Prognosis Good   Problem List Decreased strength;Decreased endurance;Impaired balance;Decreased mobility;Impaired judgement;Decreased safety awareness;Pain;Orthopedic restrictions   Assessment Patient seen for Physical Therapy evaluation. Patient admitted with s/p fall, L humeral fracture. Comorbidities affecting patient's physical performance include: falls, hypertension, hyperlipidemia, and osteoporosis.  Personal factors affecting patient at time of initial evaluation include: lives in 1 story house, ambulating with assistive device,  inability to navigate community distances, inability to navigate level surfaces without external assistance, limited home support, positive fall history, inability to perform ADLS, inability to perform IADLS , and inability to live alone. Prior to admission, patient was independent with functional mobility without assistive device, independent with ADLS, living alone in 1 story home with 0 steps to enter, ambulating household distance, and ambulating community distances.  Please find objective findings from Physical Therapy assessment regarding body systems outlined above with impairments and limitations including impaired balance, decreased endurance, gait deviations, pain, decreased activity tolerance, decreased functional mobility tolerance, decreased safety awareness, impaired judgement, fall risk, and orthopedic restrictions.  The Barthel Index was used as a functional outcome tool presenting with a score of Barthel Index Score: 45 today indicating marked limitations of functional mobility and ADLS.  Patient's clinical presentation is currently unstable/unpredictable as seen in patient's presentation of vital sign response, increased fall risk, new onset of impairment of functional mobility, and decreased endurance. Pt would benefit from continued Physical Therapy treatment to address deficits as defined above and maximize level of functional mobility. As demonstrated by objective findings, the assigned level of complexity for this evaluation is high.The patient's -[facility] Basic Mobility Inpatient Short Form Raw Score is 15. A Raw score of less than or equal to 16 suggests the patient may benefit from discharge to post-acute rehabilitation services. Please also refer to the recommendation of the Physical Therapist for safe discharge planning.   Goals   Patient Goals to return home   STG Expiration Date 10/09/24   Short Term Goal #1 Pt will perform bed mobility/transfers IND ; Standing balance will improve to fair  to decrease risk of falls ; Pt will ambulate x 50 feet with with supervision + cane ; AMPAC score will improve >17/24 to demonstrate improved functional independence   LTG Expiration Date 10/16/24   Long Term Goal #1 Standing balance will improve to fair+ to decrease risk of falls ; Pt will ambulate x 50 feet Mod I with cane vs. no AD ; AMPAC score will improve >19/24 to demonstrate improved functional independence   Plan   Treatment/Interventions ADL retraining;LE strengthening/ROM;Functional transfer training;Therapeutic exercise;Endurance training;Bed mobility;Gait training;Spoke to nursing;OT;Spoke to case management   PT Frequency 5-7x/wk   Discharge Recommendation   Rehab Resource Intensity Level, PT II (Moderate Resource Intensity)   AM-PAC Basic Mobility Inpatient   Turning in Flat Bed Without Bedrails 3   Lying on Back to Sitting on Edge of Flat Bed Without Bedrails 3   Moving Bed to Chair 3   Standing Up From Chair Using Arms 3   Walk in Room 2   Climb 3-5 Stairs With Railing 1   Basic Mobility Inpatient Raw Score 15   Basic Mobility Standardized Score 36.97   [facility] Highest Level Of Mobility   -[facility] Goal 4: Move to chair/commode   -HLM Achieved 7: Walk 25 feet or more   Barthel Index   Feeding 5   Bathing 0   Grooming Score 0   Dressing Score 5   Bladder Score 10   Bowels Score 10   Toilet Use Score 5   Transfers (Bed/Chair) Score 10   Mobility (Level Surface) Score 0   Stairs Score 0   Barthel Index Score 45   End of Consult   Patient Position at End of Consult Supine;All needs within reach   Licensure   [place] License Number  Tamara Jarquin [id number]

## 2024-10-03 ENCOUNTER — APPOINTMENT (INPATIENT)
Dept: NON INVASIVE DIAGNOSTICS | Facility: HOSPITAL | Age: 89
DRG: 811 | End: 2024-10-03
Payer: MEDICARE

## 2024-10-03 ENCOUNTER — APPOINTMENT (OUTPATIENT)
Dept: PERIOP | Facility: HOSPITAL | Age: 89
DRG: 811 | End: 2024-10-03
Payer: MEDICARE

## 2024-10-03 ENCOUNTER — ANESTHESIA EVENT (INPATIENT)
Dept: PERIOP | Facility: HOSPITAL | Age: 89
DRG: 811 | End: 2024-10-03
Payer: MEDICARE

## 2024-10-03 ENCOUNTER — ANESTHESIA (INPATIENT)
Dept: PERIOP | Facility: HOSPITAL | Age: 89
DRG: 811 | End: 2024-10-03
Payer: MEDICARE

## 2024-10-03 LAB
ANION GAP SERPL CALCULATED.3IONS-SCNC: 7 MMOL/L (ref 4–13)
AORTIC ROOT: 3.1 CM
APICAL FOUR CHAMBER EJECTION FRACTION: 69 %
ASCENDING AORTA: 2.5 CM
AV LVOT MEAN GRADIENT: 5 MMHG
AV LVOT PEAK GRADIENT: 8 MMHG
BILIRUB UR QL STRIP: NEGATIVE
BSA FOR ECHO PROCEDURE: 1.64 M2
BUN SERPL-MCNC: 16 MG/DL (ref 5–25)
CALCIUM SERPL-MCNC: 8.3 MG/DL (ref 8.4–10.2)
CHLORIDE SERPL-SCNC: 104 MMOL/L (ref 96–108)
CLARITY UR: CLEAR
CO2 SERPL-SCNC: 21 MMOL/L (ref 21–32)
COLOR UR: YELLOW
CREAT SERPL-MCNC: 0.7 MG/DL (ref 0.6–1.3)
DOP CALC LVOT PEAK VEL VTI: 29.4 CM
DOP CALC LVOT PEAK VEL: 1.44 M/S
E WAVE DECELERATION TIME: 175 MS
E/A RATIO: 0.74
ERYTHROCYTE [DISTWIDTH] IN BLOOD BY AUTOMATED COUNT: 15.5 % (ref 11.6–15.1)
FERRITIN SERPL-MCNC: 47 NG/ML (ref 11–307)
FOLATE SERPL-MCNC: >22.3 NG/ML
FRACTIONAL SHORTENING: 27 (ref 28–44)
GFR SERPL CREATININE-BSD FRML MDRD: 78 ML/MIN/1.73SQ M
GLUCOSE SERPL-MCNC: 108 MG/DL (ref 65–140)
GLUCOSE UR STRIP-MCNC: NEGATIVE MG/DL
HCT VFR BLD AUTO: 22.2 % (ref 34.8–46.1)
HCT VFR BLD AUTO: 22.9 % (ref 34.8–46.1)
HGB BLD-MCNC: 7.1 G/DL (ref 11.5–15.4)
HGB BLD-MCNC: 7.5 G/DL (ref 11.5–15.4)
HGB UR QL STRIP.AUTO: NEGATIVE
INTERVENTRICULAR SEPTUM IN DIASTOLE (PARASTERNAL SHORT AXIS VIEW): 1.1 CM
INTERVENTRICULAR SEPTUM: 1.1 CM (ref 0.6–1.1)
IRON SATN MFR SERPL: 7 % (ref 15–50)
IRON SERPL-MCNC: 20 UG/DL (ref 50–212)
KETONES UR STRIP-MCNC: ABNORMAL MG/DL
LAAS-AP2: 12.3 CM2
LAAS-AP4: 18 CM2
LEFT ATRIUM AREA SYSTOLE SINGLE PLANE A4C: 16.4 CM2
LEFT ATRIUM SIZE: 3.3 CM
LEFT ATRIUM VOLUME (MOD BIPLANE): 39 ML
LEFT ATRIUM VOLUME INDEX (MOD BIPLANE): 23.8 ML/M2
LEFT INTERNAL DIMENSION IN SYSTOLE: 2.4 CM (ref 2.1–4)
LEFT VENTRICULAR INTERNAL DIMENSION IN DIASTOLE: 3.3 CM (ref 3.5–6)
LEFT VENTRICULAR POSTERIOR WALL IN END DIASTOLE: 1 CM
LEFT VENTRICULAR STROKE VOLUME: 25 ML
LEUKOCYTE ESTERASE UR QL STRIP: ABNORMAL
LVSV (TEICH): 25 ML
MCH RBC QN AUTO: 31.4 PG (ref 26.8–34.3)
MCHC RBC AUTO-ENTMCNC: 32.8 G/DL (ref 31.4–37.4)
MCV RBC AUTO: 96 FL (ref 82–98)
MV E'TISSUE VEL-SEP: 6 CM/S
MV PEAK A VEL: 0.85 M/S
MV PEAK E VEL: 63 CM/S
MV STENOSIS PRESSURE HALF TIME: 51 MS
MV VALVE AREA P 1/2 METHOD: 4.31
NITRITE UR QL STRIP: NEGATIVE
PH UR STRIP.AUTO: 5.5 [PH]
PLATELET # BLD AUTO: 469 THOUSANDS/UL (ref 149–390)
PMV BLD AUTO: 8.1 FL (ref 8.9–12.7)
POTASSIUM SERPL-SCNC: 3.7 MMOL/L (ref 3.5–5.3)
PROCALCITONIN SERPL-MCNC: 0.07 NG/ML
PROT UR STRIP-MCNC: ABNORMAL MG/DL
RBC # BLD AUTO: 2.39 MILLION/UL (ref 3.81–5.12)
RIGHT ATRIAL 2D VOLUME: 39 ML
RIGHT ATRIUM AREA SYSTOLE A4C: 15 CM2
RIGHT VENTRICLE ID DIMENSION: 3.8 CM
SL CV LEFT ATRIUM LENGTH A2C: 4.6 CM
SL CV PED ECHO LEFT VENTRICLE DIASTOLIC VOLUME (MOD BIPLANE) 2D: 45 ML
SL CV PED ECHO LEFT VENTRICLE SYSTOLIC VOLUME (MOD BIPLANE) 2D: 19 ML
SODIUM SERPL-SCNC: 132 MMOL/L (ref 135–147)
SP GR UR STRIP.AUTO: 1.02 (ref 1–1.03)
TIBC SERPL-MCNC: 294 UG/DL (ref 250–450)
TR MAX PG: 27 MMHG
TR PEAK VELOCITY: 2.6 M/S
TRICUSPID ANNULAR PLANE SYSTOLIC EXCURSION: 2.9 CM
TRICUSPID VALVE PEAK REGURGITATION VELOCITY: 2.6 M/S
UIBC SERPL-MCNC: 274 UG/DL (ref 155–355)
UROBILINOGEN UR STRIP-ACNC: <2 MG/DL
VIT B12 SERPL-MCNC: 846 PG/ML (ref 180–914)
WBC # BLD AUTO: 7.9 THOUSAND/UL (ref 4.31–10.16)

## 2024-10-03 PROCEDURE — 81001 URINALYSIS AUTO W/SCOPE: CPT | Performed by: EMERGENCY MEDICINE

## 2024-10-03 PROCEDURE — 93306 TTE W/DOPPLER COMPLETE: CPT

## 2024-10-03 PROCEDURE — 0DJ08ZZ INSPECTION OF UPPER INTESTINAL TRACT, VIA NATURAL OR ARTIFICIAL OPENING ENDOSCOPIC: ICD-10-PCS | Performed by: INTERNAL MEDICINE

## 2024-10-03 PROCEDURE — 84145 PROCALCITONIN (PCT): CPT

## 2024-10-03 PROCEDURE — 85014 HEMATOCRIT: CPT | Performed by: INTERNAL MEDICINE

## 2024-10-03 PROCEDURE — 93306 TTE W/DOPPLER COMPLETE: CPT | Performed by: INTERNAL MEDICINE

## 2024-10-03 PROCEDURE — 43235 EGD DIAGNOSTIC BRUSH WASH: CPT | Performed by: INTERNAL MEDICINE

## 2024-10-03 PROCEDURE — 85027 COMPLETE CBC AUTOMATED: CPT

## 2024-10-03 PROCEDURE — 99223 1ST HOSP IP/OBS HIGH 75: CPT | Performed by: INTERNAL MEDICINE

## 2024-10-03 PROCEDURE — 80048 BASIC METABOLIC PNL TOTAL CA: CPT

## 2024-10-03 PROCEDURE — 85018 HEMOGLOBIN: CPT | Performed by: INTERNAL MEDICINE

## 2024-10-03 PROCEDURE — 99232 SBSQ HOSP IP/OBS MODERATE 35: CPT

## 2024-10-03 RX ORDER — LIDOCAINE HYDROCHLORIDE 10 MG/ML
INJECTION, SOLUTION EPIDURAL; INFILTRATION; INTRACAUDAL; PERINEURAL AS NEEDED
Status: DISCONTINUED | OUTPATIENT
Start: 2024-10-03 | End: 2024-10-03

## 2024-10-03 RX ORDER — SODIUM CHLORIDE, SODIUM LACTATE, POTASSIUM CHLORIDE, CALCIUM CHLORIDE 600; 310; 30; 20 MG/100ML; MG/100ML; MG/100ML; MG/100ML
INJECTION, SOLUTION INTRAVENOUS CONTINUOUS PRN
Status: DISCONTINUED | OUTPATIENT
Start: 2024-10-03 | End: 2024-10-03

## 2024-10-03 RX ORDER — PANTOPRAZOLE SODIUM 40 MG/10ML
40 INJECTION, POWDER, LYOPHILIZED, FOR SOLUTION INTRAVENOUS EVERY 12 HOURS SCHEDULED
Status: DISCONTINUED | OUTPATIENT
Start: 2024-10-03 | End: 2024-10-03

## 2024-10-03 RX ORDER — PROPOFOL 10 MG/ML
INJECTION, EMULSION INTRAVENOUS AS NEEDED
Status: DISCONTINUED | OUTPATIENT
Start: 2024-10-03 | End: 2024-10-03

## 2024-10-03 RX ADMIN — ACETAMINOPHEN 975 MG: 325 TABLET ORAL at 13:46

## 2024-10-03 RX ADMIN — LIDOCAINE HYDROCHLORIDE 50 MG: 10 INJECTION, SOLUTION EPIDURAL; INFILTRATION; INTRACAUDAL; PERINEURAL at 16:59

## 2024-10-03 RX ADMIN — PROPOFOL 50 MG: 10 INJECTION, EMULSION INTRAVENOUS at 17:01

## 2024-10-03 RX ADMIN — ATORVASTATIN CALCIUM 20 MG: 20 TABLET, FILM COATED ORAL at 21:57

## 2024-10-03 RX ADMIN — PROPOFOL 100 MG: 10 INJECTION, EMULSION INTRAVENOUS at 16:59

## 2024-10-03 RX ADMIN — PANTOPRAZOLE SODIUM 8 MG/HR: 40 INJECTION, POWDER, FOR SOLUTION INTRAVENOUS at 18:27

## 2024-10-03 RX ADMIN — LISINOPRIL 20 MG: 20 TABLET ORAL at 09:32

## 2024-10-03 RX ADMIN — SODIUM CHLORIDE, SODIUM LACTATE, POTASSIUM CHLORIDE, AND CALCIUM CHLORIDE: .6; .31; .03; .02 INJECTION, SOLUTION INTRAVENOUS at 16:50

## 2024-10-03 RX ADMIN — FLUTICASONE PROPIONATE 1 SPRAY: 50 SPRAY, METERED NASAL at 09:30

## 2024-10-03 RX ADMIN — PANTOPRAZOLE SODIUM 40 MG: 40 INJECTION, POWDER, FOR SOLUTION INTRAVENOUS at 09:34

## 2024-10-03 RX ADMIN — B-COMPLEX W/ C & FOLIC ACID TAB 1 TABLET: TAB at 09:33

## 2024-10-03 RX ADMIN — BIMATOPROST 1 DROP: 0.3 SOLUTION/ DROPS OPHTHALMIC at 09:30

## 2024-10-03 RX ADMIN — FLUTICASONE PROPIONATE 1 SPRAY: 50 SPRAY, METERED NASAL at 21:56

## 2024-10-03 RX ADMIN — ACETAMINOPHEN 975 MG: 325 TABLET ORAL at 21:56

## 2024-10-03 RX ADMIN — ACETAMINOPHEN 975 MG: 325 TABLET ORAL at 05:48

## 2024-10-03 RX ADMIN — PROPOFOL 50 MG: 10 INJECTION, EMULSION INTRAVENOUS at 17:04

## 2024-10-03 RX ADMIN — CHOLECALCIFEROL (VITAMIN D3) 10 MCG (400 UNIT) TABLET 400 UNITS: at 09:32

## 2024-10-03 NOTE — ASSESSMENT & PLAN NOTE
Possibly reactive in setting of fall and acute fracture  Patient is afebrile and hemodynamically stable  Procalcitonin negative  CXR shows pneumonia vs atelectasis  Given improvement of WBC without antibiotics, doubt acute infection  UA is pending though denies urinary symptoms  Monitor cbc

## 2024-10-03 NOTE — PROGRESS NOTES
Progress Note - Hospitalist   Name: Pattie Manning 89 y.o. female I MRN: [id number]  Unit/Bed#: 2 [address] Date of Admission: 10/2/2024   Date of Service: 10/3/2024 I Hospital Day: 1    Assessment & Plan  Left humeral fracture  Presented to ED with left arm pain after fall at home  XR L humerus shows fracture of left humeral proximal metaphysis  Continue supportive care with LUE sling  Scheduled tylenol  PT/OT  Fall precautions  Anemia  Hemoglobin 8.4 on admission, baseline appears around 11-13 with most recent hemoglobin 13.0 in April  She does report stools have been black recently  Hold home aspirin  B12 and folate WNL, iron panel consistent with JAXON  Continue IV protonix  Hemoglobin dropped to 7.5 this morning  GI consulted - plan for EGD today  Repeat cbc in am  Syncope  While working with PT in the ED, patient said she needed to sit down and reportedly became unresponsive and was assisted to chair  Rapid response called 10/2 after patient had another syncopal episode while ambulating to the bathroom  EKG shows NSR with PACs  Troponin negative  ECHO shows EF 65%, systolic function normal, diastolic function normal for age, no major valvular abnormalities  Check orthostatic vitals when able  Monitor on telemetry  Likely in setting of acute anemia/GI bleed  Leukocytosis  Possibly reactive in setting of fall and acute fracture  Patient is afebrile and hemodynamically stable  Procalcitonin negative  CXR shows pneumonia vs atelectasis  Given improvement of WBC without antibiotics, doubt acute infection  UA is pending though denies urinary symptoms  Monitor cbc  Primary hypertension  Continue lisinopril  Dyslipidemia  Continue lipitor  Age-related osteoporosis without current pathological fracture  Continue calcium and vitamin D supplementation    VTE Pharmacologic Prophylaxis: VTE Score: 3 Moderate Risk (Score 3-4) - Pharmacological DVT Prophylaxis Contraindicated. Sequential Compression Devices  Ordered.    Mobility:   Basic Mobility Inpatient Raw Score: 15  JH-HLM Goal: 4: Move to chair/commode  JH-HLM Achieved: 1: Laying in bed  JH-HLM Goal NOT achieved. Continue with multidisciplinary rounding and encourage appropriate mobility to improve upon JH-HLM goals.    Patient Centered Rounds: I performed bedside rounds with nursing staff today.   Discussions with Specialists or Other Care Team Provider: GI, rn, cm    Education and Discussions with Family / Patient:  patient denied call, neighbor/friend is coming to bedside this afternoon.     Current Length of Stay: 1 day(s)  Current Patient Status: Inpatient   Certification Statement: The patient will continue to require additional inpatient hospital stay due to anemia, GI bleed, EGD, syncope  Discharge Plan: Anticipate discharge in 48-72 hrs to discharge location to be determined pending rehab evaluations.    Code Status: Level 1 - Full Code    Subjective   Patient seen and examined at bedside this morning. She reports feeling well. She does not remember passing out last night. She denies dizziness, lightheadedness, chest pain, shortness of breath, nausea, vomiting, abdominal pain. No bowel movements since admission per patient an RN at bedside. Reports she is hungry but understands she cannot eat. She says she has been having black bowel movements for maybe a week. She says her arm has not been bothering her since she has not been moving it much.    Objective :  Temp:  [98.1 °F (36.7 °C)-98.8 °F (37.1 °C)] 98.8 °F (37.1 °C)  HR:  [79-94] 93  BP: (105-160)/(57-88) 152/66  Resp:  [20-21] 20  SpO2:  [92 %-96 %] 95 %  O2 Device: None (Room air)    Body mass index is 22.66 kg/m².     Input and Output Summary (last 24 hours):     Intake/Output Summary (Last 24 hours) at 10/3/2024 1245  Last data filed at 10/2/2024 1702  Gross per 24 hour   Intake 500 ml   Output --   Net 500 ml       Physical Exam  Vitals and nursing note reviewed.   Constitutional:       General:  She is not in acute distress.     Appearance: She is well-developed.   Cardiovascular:      Rate and Rhythm: Normal rate and regular rhythm.   Pulmonary:      Effort: Pulmonary effort is normal. No respiratory distress.      Breath sounds: Normal breath sounds.   Abdominal:      Palpations: Abdomen is soft.      Tenderness: There is no abdominal tenderness.   Musculoskeletal:         General: No swelling.      Comments: LUE in sling   Skin:     General: Skin is warm and dry.   Neurological:      Mental Status: She is alert.   Psychiatric:         Mood and Affect: Mood normal.           Lines/Drains:        Telemetry:  Telemetry Orders (From admission, onward)               24 Hour Telemetry Monitoring  Continuous x 24 Hours (Telem)        Expiring   Question:  Reason for 24 Hour Telemetry  Answer:  Syncope suspected to be cardiac in origin                     Telemetry Reviewed: Normal Sinus Rhythm  Indication for Continued Telemetry Use: Syncope               Lab Results: I have reviewed the following results:   Results from last 7 days   Lab Units 10/03/24  0548 10/02/24  1507   WBC Thousand/uL 7.90 20.03*   HEMOGLOBIN g/dL 7.5* 8.4*   HEMATOCRIT % 22.9* 26.6*   PLATELETS Thousands/uL 469* 522*   SEGS PCT %  --  89*   LYMPHO PCT %  --  5*   MONO PCT %  --  5   EOS PCT %  --  0     Results from last 7 days   Lab Units 10/03/24  0548 10/02/24  1507   SODIUM mmol/L 132* 132*   POTASSIUM mmol/L 3.7 3.7   CHLORIDE mmol/L 104 102   CO2 mmol/L 21 21   BUN mg/dL 16 15   CREATININE mg/dL 0.70 0.77   ANION GAP mmol/L 7 9   CALCIUM mg/dL 8.3* 9.1   ALBUMIN g/dL  --  3.7   TOTAL BILIRUBIN mg/dL  --  0.32   ALK PHOS U/L  --  56   ALT U/L  --  12   AST U/L  --  18   GLUCOSE RANDOM mg/dL 108 128     Results from last 7 days   Lab Units 10/02/24  1507   INR  1.04     Results from last 7 days   Lab Units 10/02/24  2110 10/02/24  1505   POC GLUCOSE mg/dl 133 127         Results from last 7 days   Lab Units 10/03/24  0548    PROCALCITONIN ng/ml 0.07       Recent Cultures (last 7 days):         Imaging Results Review: I reviewed radiology reports from this admission including: chest xray.  Other Study Results Review: EKG was reviewed.     Last 24 Hours Medication List:     Current Facility-Administered Medications:     acetaminophen (TYLENOL) tablet 975 mg, Q8H MAMIE    atorvastatin (LIPITOR) tablet 20 mg, HS    bimatoprost (LUMIGAN) 0.03 % ophthalmic drops 1 drop, Daily    Cholecalciferol (VITAMIN D3) tablet 400 Units, Daily    fluticasone (FLONASE) 50 mcg/act nasal spray 1 spray, BID    lisinopril (ZESTRIL) tablet 20 mg, Daily    multivitamin stress formula tablet 1 tablet, Daily    pantoprazole (PROTONIX) injection 40 mg, Q12H MAMIE    Administrative Statements   Today, Patient Was Seen By: Cathy Brothers PA-C    **Please Note: This note may have been constructed using a voice recognition system.**

## 2024-10-03 NOTE — NURSING NOTE
While assisting patient in the bathroom, she lost consciousness and was assisted by this nurse to the floor. Rapid response called.

## 2024-10-03 NOTE — QUICK NOTE
RRT called due to near syncopal episode while RN ambulated patient to the bathroom. SBP 110s. Concern for orthostatic hypotension. Ordered for strict bed rest. GI consult tomorrow for anemia work up. Called patient's neighbor to provide update.

## 2024-10-03 NOTE — ANESTHESIA POSTPROCEDURE EVALUATION
Post-Op Assessment Note    CV Status:  Stable  Pain Score: 0    Pain management: adequate       Mental Status:  Alert and awake   Hydration Status:  Euvolemic   PONV Controlled:  Controlled   Airway Patency:  Patent     Post Op Vitals Reviewed: Yes    No anethesia notable event occurred.    Staff: Anesthesiologist, CRNA   Comments: Report given to recovering RN, VSS. Pt states she is comfortable            /52 (10/03/24 1715)    Temp      Pulse 98 (10/03/24 1715)   Resp 18 (10/03/24 1715)    SpO2 100 % (10/03/24 1715)

## 2024-10-03 NOTE — ASSESSMENT & PLAN NOTE
Hemoglobin 8.4 on admission, baseline appears around 11-13 with most recent hemoglobin 13.0 in April  She does report stools have been black recently  Hold home aspirin  B12 and folate WNL, iron panel consistent with JAXON  Continue IV protonix  Hemoglobin dropped to 7.5 this morning  GI consulted - plan for EGD today  Repeat cbc in am

## 2024-10-03 NOTE — CONSULTS
Consultation -  [facility]  Pattie MESSI Manning 89 y.o. female MRN: [id number]  Unit/Bed#: 2 [place] [address] Encounter: [id number]            Reason for Consult / Principal Problem:   Anemia, black stool      ASSESSMENT AND PLAN:    Anemia  Hemoglobin 8.4 on admission, trended down to 7.5 , baseline appears around 11-13 with most recent hemoglobin 13.0 in April  She does report stools have been black recently  Home aspirin was held  Continue n.p.o.  Protonix IV twice daily ordered  Plan EGD for today for further evaluation    Thank you for the consultation.  Case will be discussed with Dr. Arceo.      ______________________________________________________________________    HPI:     89 y.o. female with a PMH of HTN, HLD, osteoporosis who presents with left arm pain after a fall at home.  Patient states yesterday she was standing on top of a chair to replace a filter in her refrigerator.  She states she lost her balance and fell off the chair onto her left side.  She denies hitting her head or passing out.  She reports left arm pain and left knee pain when she ambulates. Neighbor had noted she has been fatigued and weaker than usual lately.  She reports at home she has been more tired..  She does note her stools have been black recently, unclear how long  Hgb noted to be 7.5 today, was 13 back in April. She had a presyncopal episode last night while ambulating to the bathroom.  Patient reports that she believes she had a colonoscopy in the past and I was able to locate that which showed a small polyp back in 2017 and extensive diverticulosis of the sigmoid colon.  She has never had upper endoscopy.  She denies NSAID use but does take baby aspirin daily.  She reports that she is having back pain and denies any GI complaints.  Patient does report she lost weight but cannot quantify how much but states that she was not eating as much due to a hot summer weather.  No complaints of abdominal pain.  No  nausea or vomiting or indigestion symptoms.          REVIEW OF SYSTEMS:    CONSTITUTIONAL: Denies any fever, chills, rigors, and weight loss.  HEENT: No earache or tinnitus. Denies hearing loss or visual disturbances.  CARDIOVASCULAR: No chest pain or palpitations.   RESPIRATORY: Denies any cough, hemoptysis, shortness of breath or dyspnea on exertion.  GASTROINTESTINAL: As noted in the History of Present Illness.   GENITOURINARY: No problems with urination. Denies any hematuria or dysuria.  NEUROLOGIC: No dizziness or vertigo, denies headaches.   MUSCULOSKELETAL: Denies any muscle or joint pain.   SKIN: Denies skin rashes or itching.   ENDOCRINE: Denies excessive thirst. Denies intolerance to heat or cold.  PSYCHOSOCIAL: Denies depression or anxiety. Denies any recent memory loss.       Historical Information   Past Medical History:   Diagnosis Date    Arthritis     GERD (gastroesophageal reflux disease)     Hyperlipidemia     Hypertension     Osteoporosis     Urinary tract infection      Past Surgical History:   Procedure Laterality Date    CATARACT EXTRACTION      COLONOSCOPY N/A 3/2/2017    Procedure: COLONOSCOPY;  Surgeon: Pablo John MD;  Location: [facility];  Service:     JOINT REPLACEMENT Bilateral 2011, 2012    hips    REFRACTIVE SURGERY Right      Social History   Social History     Substance and Sexual Activity   Alcohol Use No    Comment: on holidays     Social History     Substance and Sexual Activity   Drug Use No     Social History     Tobacco Use   Smoking Status Never    Passive exposure: Never   Smokeless Tobacco Never     History reviewed. No pertinent family history.    Meds/Allergies       Medications Prior to Admission:     aspirin 81 MG tablet    atorvastatin (LIPITOR) 20 mg tablet    bimatoprost (LUMIGAN) 0.01 % ophthalmic drops    calcium carbonate (OS-MACRINA) 600 MG tablet    Cholecalciferol (Vitamin D) 50 MCG (2000 UT) CAPS    fluticasone (FLONASE) 50 mcg/act nasal spray    lisinopril  "(ZESTRIL) 20 mg tablet    multivitamin (THERAGRAN) TABS    Current Facility-Administered Medications:     acetaminophen (TYLENOL) tablet 975 mg, Q8H MAMIE    atorvastatin (LIPITOR) tablet 20 mg, HS    bimatoprost (LUMIGAN) 0.03 % ophthalmic drops 1 drop, Daily    Cholecalciferol (VITAMIN D3) tablet 400 Units, Daily    fluticasone (FLONASE) 50 mcg/act nasal spray 1 spray, BID    lisinopril (ZESTRIL) tablet 20 mg, Daily    multivitamin stress formula tablet 1 tablet, Daily    pantoprazole (PROTONIX) injection 40 mg, Q24H MAMIE    No Known Allergies        Objective     Blood pressure 152/66, pulse 93, temperature 98.8 °F (37.1 °C), temperature source Oral, resp. rate 20, height 5' 4\" (1.626 m), weight 59.9 kg (132 lb), SpO2 95%, not currently breastfeeding. Body mass index is 22.66 kg/m².      Intake/Output Summary (Last 24 hours) at 10/3/2024 0929  Last data filed at 10/2/2024 1702  Gross per 24 hour   Intake 500 ml   Output --   Net 500 ml         PHYSICAL EXAM:      General Appearance:   Alert, cooperative, no distress   HEENT:   Normocephalic, atraumatic, anicteric.     Neck:  Supple, symmetrical, trachea midline   Lungs:   Clear to auscultation bilaterally; no rales, rhonchi or wheezing; respirations unlabored    Heart::   Regular rate and rhythm; no murmur, rub, or gallop.   Abdomen:   Soft, non-tender, non-distended; normal bowel sounds; no masses, no organomegaly    Genitalia:   Deferred    Rectal:   Deferred    Extremities:  No cyanosis, clubbing or edema    Pulses:  2+ and symmetric all extremities    Skin:  No jaundice, rashes, or lesions    Lymph nodes:  No palpable cervical lymphadenopathy        Lab Results:   Admission on 10/02/2024   Component Date Value    WBC 10/02/2024 20.03 (H)     RBC 10/02/2024 2.74 (L)     Hemoglobin 10/02/2024 8.4 (L)     Hematocrit 10/02/2024 26.6 (L)     MCV 10/02/2024 97     MCH 10/02/2024 30.7     MCHC 10/02/2024 31.6     RDW 10/02/2024 15.5 (H)     MPV 10/02/2024 7.9 (L)     " Platelets 10/02/2024 522 (H)     nRBC 10/02/2024 0     Segmented % 10/02/2024 89 (H)     Immature Grans % 10/02/2024 1     Lymphocytes % 10/02/2024 5 (L)     Monocytes % 10/02/2024 5     Eosinophils Relative 10/02/2024 0     Basophils Relative 10/02/2024 0     Absolute Neutrophils 10/02/2024 17.95 (H)     Absolute Immature Grans 10/02/2024 0.12     Absolute Lymphocytes 10/02/2024 0.94     Absolute Monocytes 10/02/2024 0.99     Eosinophils Absolute 10/02/2024 0.00     Basophils Absolute 10/02/2024 0.03     Protime 10/02/2024 14.1     INR 10/02/2024 1.04     PTT 10/02/2024 25     Sodium 10/02/2024 132 (L)     Potassium 10/02/2024 3.7     Chloride 10/02/2024 102     CO2 10/02/2024 21     ANION GAP 10/02/2024 9     BUN 10/02/2024 15     Creatinine 10/02/2024 0.77     Glucose 10/02/2024 128     Calcium 10/02/2024 9.1     AST 10/02/2024 18     ALT 10/02/2024 12     Alkaline Phosphatase 10/02/2024 56     Total Protein 10/02/2024 6.3 (L)     Albumin 10/02/2024 3.7     Total Bilirubin 10/02/2024 0.32     eGFR 10/02/2024 69     Magnesium 10/02/2024 2.2     hs TnI 0hr 10/02/2024 4     POC Glucose 10/02/2024 127     hs TnI 2hr 10/02/2024 4     Delta 2hr hsTnI 10/02/2024 0     hs TnI 4hr 10/02/2024 6     Delta 4hr hsTnI 10/02/2024 2     BSA 10/03/2024 1.64     A4C EF 10/03/2024 69     LVIDd 10/03/2024 3.30     LVIDS 10/03/2024 2.40     IVSd 10/03/2024 1.10     LVPWd 10/03/2024 1.00     LVOT peak VTI 10/03/2024 29.4     FS 10/03/2024 27     MV E' Tissue Velocity Se* 10/03/2024 6     LA Volume Index (BP) 10/03/2024 23.8     E/A ratio 10/03/2024 0.74     E wave deceleration time 10/03/2024 175     MV Peak E M[place] 10/03/2024 63     MV Peak A [place] 10/03/2024 0.85     AV LVOT peak gradient 10/03/2024 8     LVOT peak mookie 10/03/2024 1.44     RVID d 10/03/2024 3.8     Tricuspid annular plane * 10/03/2024 2.90     LA size 10/03/2024 3.3     LA length (A2C) 10/03/2024 4.60     MAGDY A4C 10/03/2024 16.4     LA volume (BP) 10/03/2024 39     RA 2D  Volume 10/03/2024 39.0     RAA A4C 10/03/2024 15     LVOT mn grad 10/03/2024 5.0     MV stenosis pressure 1/2* 10/03/2024 51     MV valve area p 1/2 meth* 10/03/2024 4.31     TR Peak Mookie 10/03/2024 2.6     Triscuspid Valve Regurgi* 10/03/2024 27.0     Ao root 10/03/2024 3.10     Asc Ao 10/03/2024 2.5     Tricuspid valve peak reg* 10/03/2024 2.60     Left ventricular stroke * 10/03/2024 25.00     IVS 10/03/2024 1.1     LEFT VENTRICLE SYSTOLIC * 10/03/2024 19     LV DIASTOLIC VOLUME (MOD* 10/03/2024 45     Left Atrium Area-systoli* 10/03/2024 18     Left Atrium Area-systoli* 10/03/2024 12.3     LVSV, 2D 10/03/2024 25     Folate 10/02/2024 >22.3     Vitamin B-12 10/02/2024 846     Iron Saturation 10/02/2024 7 (L)     TIBC 10/02/2024 294     Iron 10/02/2024 20 (L)     UIBC 10/02/2024 274     Ferritin 10/02/2024 47     POC Glucose 10/02/2024 133     Ventricular Rate 10/02/2024 76     Atrial Rate 10/02/2024 76     NJ Interval 10/02/2024 152     QRSD Interval 10/02/2024 78     QT Interval 10/02/2024 404     QTC Interval 10/02/2024 454     P Axis 10/02/2024 72     QRS Axis 10/02/2024 69     T Wave [place] 10/02/2024 65     Ventricular Rate 10/02/2024 82     Atrial Rate 10/02/2024 82     NJ Interval 10/02/2024 160     QRSD Interval 10/02/2024 76     QT Interval 10/02/2024 384     QTC Interval 10/02/2024 448     P Axis 10/02/2024 72     QRS Axis 10/02/2024 69     T Wave [place] 10/02/2024 64     Ventricular Rate 10/02/2024 80     Atrial Rate 10/02/2024 80     NJ Interval 10/02/2024 154     QRSD Interval 10/02/2024 78     QT Interval 10/02/2024 402     QTC Interval 10/02/2024 463     P Axis 10/02/2024 69     QRS [place] 10/02/2024 68     T Wave [place] 10/02/2024 63     Ventricular Rate 10/02/2024 82     Atrial Rate 10/02/2024 82     NJ Interval 10/02/2024 150     QRSD Interval 10/02/2024 82     QT Interval 10/02/2024 390     QTC Interval 10/02/2024 455     P Axis 10/02/2024 79     QRS [place] 10/02/2024 69     T Wave [place] 10/02/2024 63      Ventricular Rate 10/02/2024 82     Atrial Rate 10/02/2024 82     OR Interval 10/02/2024 154     QRSD Interval 10/02/2024 76     QT Interval 10/02/2024 396     QTC Interval 10/02/2024 462     P Axis 10/02/2024 73     QRS [place] 10/02/2024 69     T Wave [place] 10/02/2024 67     Ventricular Rate 10/02/2024 82     Atrial Rate 10/02/2024 82     OR Interval 10/02/2024 152     QRSD Interval 10/02/2024 80     QT Interval 10/02/2024 392     QTC Interval 10/02/2024 457     P Axis 10/02/2024 72     QRS Axis 10/02/2024 69     T Wave [place] 10/02/2024 68     Ventricular Rate 10/02/2024 80     Atrial Rate 10/02/2024 80     OR Interval 10/02/2024 162     QRSD Interval 10/02/2024 84     QT Interval 10/02/2024 404     QTC Interval 10/02/2024 465     P Axis 10/02/2024 63     QRS [place] 10/02/2024 54     T Wave [place] 10/02/2024 55     Ventricular Rate 10/02/2024 81     Atrial Rate 10/02/2024 81     OR Interval 10/02/2024 160     QRSD Interval 10/02/2024 82     QT Interval 10/02/2024 414     QTC Interval 10/02/2024 480     P Axis 10/02/2024 62     QRS Axis 10/02/2024 55     T Wave [place] 10/02/2024 55     Sodium 10/03/2024 132 (L)     Potassium 10/03/2024 3.7     Chloride 10/03/2024 104     CO2 10/03/2024 21     ANION GAP 10/03/2024 7     BUN 10/03/2024 16     Creatinine 10/03/2024 0.70     Glucose 10/03/2024 108     Calcium 10/03/2024 8.3 (L)     eGFR 10/03/2024 78     WBC 10/03/2024 7.90     RBC 10/03/2024 2.39 (L)     Hemoglobin 10/03/2024 7.5 (L)     Hematocrit 10/03/2024 22.9 (L)     MCV 10/03/2024 96     MCH 10/03/2024 31.4     MCHC 10/03/2024 32.8     RDW 10/03/2024 15.5 (H)     Platelets 10/03/2024 469 (H)     MPV 10/03/2024 8.1 (L)     Procalcitonin 10/03/2024 0.07        Imaging Studies: Echo complete w/ contrast if indicated    Result Date: 10/3/2024  Narrative:   Left Ventricle: Left ventricular cavity size is normal. Wall thickness is mildly increased. The left ventricular ejection fraction is 65%. Systolic function is normal. Wall  motion is normal. Diastolic function is normal for age.   IVS: There is sigmoid appearance of the septum.   Right Ventricle: Systolic function is normal.   Mitral Valve: There is mild annular calcification.   Tricuspid Valve: There is mild regurgitation.   Pericardium: There is a trivial pericardial effusion along the right atrial free wall. There is no echocardiographic evidence of tamponade.     XR chest portable    Result Date: 10/3/2024  Narrative: XR CHEST PORTABLE INDICATION: leukocytosis, cough. COMPARISON: Obstruction series radiographs 6/15/2015. CT chest, abdomen, pelvis 7/14/2018. FINDINGS: Patchy airspace opacity in the left lower lung zone, not seen on prior studies. No pneumothorax or pleural effusion. Normal cardiomediastinal silhouette. Redemonstrated acute comminuted and angulated fracture of the proximal left humeral metaphysis seen on left humerus radiographs performed earlier today. Normal upper abdomen.     Impression: Patchy airspace opacity in the left lower lung zone, which may represent pneumonia in the appropriate clinical setting versus atelectasis. Recommend short-term follow-up chest radiographs in 8 to 12 weeks to assess for resolution. The study was marked in EPIC for immediate notification. Workstation performed: [id number]     XR humerus LEFT    Result Date: 10/2/2024  Narrative: XR HUMERUS LEFT INDICATION: fall   pain mid shaft humerus on left. COMPARISON: None FINDINGS: Osteopenia. Comminuted and angulated fracture of the left humeral proximal metaphysis. No other fractures. No destructive osseous lesion. No significant degenerative changes. Unremarkable soft tissues.     Impression: Osteopenia. Comminuted and angulated fracture of the left humeral proximal metaphysis. No other fractures. As per comments in electronic health record, findings are concordant with preliminary interpretation provided by the emergency room physician. Computerized Assisted Algorithm (CAA) may have been  used to analyze all applicable images. Workstation performed: [id number]

## 2024-10-03 NOTE — PLAN OF CARE
Problem: PAIN - ADULT  Goal: Verbalizes/displays adequate comfort level or baseline comfort level  Description: Interventions:  - Encourage patient to monitor pain and request assistance  - Assess pain using appropriate pain scale  - Administer analgesics based on type and severity of pain and evaluate response  - Implement non-pharmacological measures as appropriate and evaluate response  - Consider cultural and social influences on pain and pain management  - Notify physician/advanced practitioner if interventions unsuccessful or patient reports new pain  10/2/2024 2240 by Gayle Villasenor RN  Outcome: Progressing  10/2/2024 2238 by Gayle Villasenor RN  Outcome: Progressing     Problem: INFECTION - ADULT  Goal: Absence or prevention of progression during hospitalization  Description: INTERVENTIONS:  - Assess and monitor for signs and symptoms of infection  - Monitor lab/diagnostic results  - Monitor all insertion sites, i.e. indwelling lines, tubes, and drains  - Monitor endotracheal if appropriate and nasal secretions for changes in amount and color  - [place] appropriate cooling/warming therapies per order  - Administer medications as ordered  - Instruct and encourage patient and family to use good hand hygiene technique  - Identify and instruct in appropriate isolation precautions for identified infection/condition  10/2/2024 2240 by Gayle Villasenor RN  Outcome: Progressing  10/2/2024 2238 by Gayle Villasenor RN  Outcome: Progressing  Goal: Absence of fever/infection during neutropenic period  Description: INTERVENTIONS:  - Monitor WBC    10/2/2024 2240 by Gayle Villasenor RN  Outcome: Progressing  10/2/2024 2238 by Gayle Villasenor RN  Outcome: Progressing     Problem: SAFETY ADULT  Goal: Patient will remain free of falls  Description: INTERVENTIONS:  - Educate patient/family on patient safety including physical limitations  - Instruct patient to call for assistance with activity    - Consult OT/PT to assist with strengthening/mobility   - Keep Call bell within reach  - Keep bed low and locked with side rails adjusted as appropriate  - Keep care items and personal belongings within reach  - Initiate and maintain comfort rounds  - Make Fall Risk Sign visible to staff  - Offer Toileting every 2 Hours, in advance of need  - Initiate/Maintain bed alarm  - Apply yellow socks and bracelet for high fall risk patients  - Consider moving patient to room near nurses station  10/2/2024 2240 by Gayle Villasenor RN  Outcome: Progressing  10/2/2024 2238 by Gayle Villasenor RN  Outcome: Progressing  Goal: Maintain or return to baseline ADL function  Description: INTERVENTIONS:  -  Assess patient's ability to carry out ADLs; assess patient's baseline for ADL function and identify physical deficits which impact ability to perform ADLs (bathing, care of mouth/teeth, toileting, grooming, dressing, etc.)  - Assess/evaluate cause of self-care deficits   - Assess range of motion  - Assess patient's mobility; develop plan if impaired  - Assess patient's need for assistive devices and provide as appropriate  - Encourage maximum independence but intervene and supervise when necessary  - Involve family in performance of ADLs  - Assess for home care needs following discharge   - Consider OT consult to assist with ADL evaluation and planning for discharge  - Provide patient education as appropriate  10/2/2024 2240 by Gayle Villasenor RN  Outcome: Progressing  10/2/2024 2238 by Gayle Villasenor RN  Outcome: Progressing  Goal: Maintains/Returns to pre admission functional level  Description: INTERVENTIONS:  - Perform AM-PAC 6 Click Basic Mobility/ Daily Activity assessment daily.  - Set and communicate daily mobility goal to care team and patient/family/caregiver.   - Collaborate with rehabilitation services on mobility goals if consulted  - Perform Range of Motion 3 times a day.  - Reposition patient  every 2 hours.  - Dangle patient 3 times a day  - Stand patient 3 times a day  - Ambulate patient 3 times a day  - Out of bed to chair 3 times a day   - Out of bed for meals 3  Problem: DISCHARGE PLANNING  Goal: Discharge to home or other facility with appropriate resources  Description: INTERVENTIONS:  - Identify barriers to discharge w/patient and caregiver  - Arrange for needed discharge resources and transportation as appropriate  - Identify discharge learning needs (meds, wound care, etc.)  - Arrange for interpretive services to assist at discharge as needed  - Refer to Case Management Department for coordinating discharge planning if the patient needs post-hospital services based on physician/advanced practitioner order or complex needs related to functional status, cognitive ability, or social support system  10/2/2024 2240 by Gayle Villasenor RN  Outcome: Progressing  10/2/2024 2238 by Gayle Villasenor RN  Outcome: Progressing     Problem: Knowledge Deficit  Goal: Patient/family/caregiver demonstrates understanding of disease process, treatment plan, medications, and discharge instructions  Description: Complete learning assessment and assess knowledge base.  Interventions:  - Provide teaching at level of understanding  - Provide teaching via preferred learning methods  10/2/2024 2240 by Gayle Villasenor RN  Outcome: Progressing  10/2/2024 2238 by Gayle Villasenor RN  Outcome: Progressing    times a day  - Out of bed for toileting  - Record patient progress and toleration of activity level   10/2/2024 2240 by Gayle Villasenor RN  Outcome: Progressing  10/2/2024 2238 by Gayle Villasenor RN  Outcome: Progressing

## 2024-10-03 NOTE — ANESTHESIA PREPROCEDURE EVALUATION
Procedure:  EGD    Relevant Problems   CARDIO   (+) Primary hypertension      HEMATOLOGY   (+) Anemia      MUSCULOSKELETAL   (+) Osteoarthritis of multiple joints        Physical Exam    Airway    Mallampati score: II  TM Distance: >3 FB  Neck ROM: full     Dental   No notable dental hx     Cardiovascular  Cardiovascular exam normal    Pulmonary  Pulmonary exam normal     Other Findings  post-pubertal.      Anesthesia Plan  ASA Score- 3     Anesthesia Type- IV sedation with anesthesia with ASA Monitors.         Additional Monitors:     Airway Plan:            Plan Factors-Exercise tolerance (METS): >4 METS.    Chart reviewed.   Existing labs reviewed. Patient summary reviewed.    Patient is not a current smoker.              Induction-     Postoperative Plan-     Perioperative Resuscitation Plan - Level 1 - Full Code.       Informed Consent- Anesthetic plan and risks discussed with patient.  I personally reviewed this patient with the CRNA. Discussed and agreed on the Anesthesia Plan with the CRNA..

## 2024-10-03 NOTE — ASSESSMENT & PLAN NOTE
While working with PT in the ED, patient said she needed to sit down and reportedly became unresponsive and was assisted to chair  Rapid response called 10/2 after patient had another syncopal episode while ambulating to the bathroom  EKG shows NSR with PACs  Troponin negative  ECHO shows EF 65%, systolic function normal, diastolic function normal for age, no major valvular abnormalities  Check orthostatic vitals when able  Monitor on telemetry  Likely in setting of acute anemia/GI bleed

## 2024-10-03 NOTE — RAPID RESPONSE
Rapid Response Note  Pattie Manning 89 y.o. female MRN: [id number]  Unit/Bed#: 2 [place] [address] Encounter: [id number]    Rapid Response Notification(s):   Response called date/time:  10/2/2024 9:10 PM  Response team arrival date/time:  10/2/2024 9:15 PM  Response end date/time:  10/2/2024 9:20 PM  Level of care:  Medsur  Rapid response location[facility]  [facility]  Primary reason for rapid response call:  Other (comment) (Syncopal episode)    Rapid Response Intervention(s):   Airway:  None  Breathing:  None  Circulation:  None  Fluids administered:  None  Medications administered:  None       Assessment:   Syncopal episode in the bathroom    Plan:   Monitor on telemetry  Keep in bed tonight  Trend hemodynamics     Rapid Response Outcome:   Transfer:  Remain on floor  Code Status: Level 1 (Full Code)      Family notified: Primary team to notify Family Member       Background/Situation:   Pattie Manning is a 89 y.o. female who has a past medical history of hypertension, hyperlipidemia, osteoporosis who presented with left arm pain after a fall from home.  She stated she lost her balance and fell off the chair.  She denied hitting her head or any LOC.  She stated she had been fatigued and weaker than usual.  She was admitted to the medical surgical floor for further monitoring/weakness.  Of note, earlier today, the patient had a syncopal episode after ambulating with PT.  She was transferred to the transport chair and then put back in bed.  Her mentation improved on her own.    A rapid response was called earlier this evening, because the patient was assisted to the bathroom, and appeared to have a similar syncopal episode witnessed by nursing he was with her the entire time.  On arrival, the patient was standing upright at the sink, assisted by RN x 2.  She was alert and oriented, and being transported to the recliner from the bathroom.  She was assisted back to bed, and her hemodynamics were stable.  She was alert  "and oriented, hard of hearing, but denied any dizziness, chest pain, shortness of breath, or pain.  She was placed on telemetry which confirmed sinus rhythm with PACs at a heart rate of 82.  Her systolic blood pressure was 107/56.  She remained on room air, and was hemodynamically stable.    Review of Systems   Constitutional:  Positive for fatigue.   HENT: Negative.     Eyes: Negative.    Respiratory: Negative.     Cardiovascular: Negative.    Gastrointestinal: Negative.    Endocrine: Negative.    Genitourinary: Negative.    Musculoskeletal: Negative.    Allergic/Immunologic: Negative.    Neurological:  Positive for weakness.   Hematological: Negative.    Psychiatric/Behavioral: Negative.         Objective:   Vitals:    10/02/24 1955 10/02/24 2104 10/02/24 2211 10/02/24 2220   BP: 157/79 (P) 157/79  157/79   BP Location:  (P) Right arm  Right arm   Pulse: 88 (P) 88  88   Resp:  (P) 21  21   Temp: 98.3 °F (36.8 °C) 98.3 °F (36.8 °C) 98.3 °F (36.8 °C) 98.3 °F (36.8 °C)   TempSrc:  Oral Oral Oral   SpO2: 96%  96%    Weight:    59.9 kg (132 lb)   Height:  (P) 5' 4\" (1.626 m)  5' 4\" (1.626 m)     Physical Exam  Vitals and nursing note reviewed.   Constitutional:       Appearance: Normal appearance.   HENT:      Head: Normocephalic and atraumatic.      Right Ear: Tympanic membrane, ear canal and external ear normal.      Left Ear: Tympanic membrane, ear canal and external ear normal.      Nose: Nose normal.      Mouth/Throat:      Mouth: Mucous membranes are dry.   Eyes:      Comments: Left pupil cataract - non reactive    Cardiovascular:      Rate and Rhythm: Normal rate.   Pulmonary:      Breath sounds: Normal breath sounds.   Abdominal:      General: Bowel sounds are normal.      Palpations: Abdomen is soft.   Genitourinary:     Comments: Due to void   Musculoskeletal:         General: Normal range of motion.   Skin:     General: Skin is warm and dry.      Capillary Refill: Capillary refill takes less than 2 seconds. "   Neurological:      Mental Status: She is alert and oriented to person, place, and time. Mental status is at baseline.   Psychiatric:         Mood and Affect: Mood normal.         Behavior: Behavior normal.

## 2024-10-03 NOTE — CASE MANAGEMENT
Case Management Assessment & Discharge Planning Note    Patient name Pattie Manning  Location [address] [place] [address]/[address] [place] [address] MRN [id number]  : 1935 Date 10/3/2024       Current Admission Date: 10/2/2024  Current Admission Diagnosis:Left humeral fracture   Patient Active Problem List    Diagnosis Date Noted Date Diagnosed    Left humeral fracture 10/02/2024     Syncope 10/02/2024     Anemia 10/02/2024     Leukocytosis 10/02/2024     Osteoarthritis of multiple joints 01/10/2023     Allergies 01/10/2023     Primary hypertension 2022     Dyslipidemia 2022     Age-related osteoporosis without current pathological fracture 2022       LOS (days): 1  Geometric Mean LOS (GMLOS) (days): 2.8  Days to GMLOS:1.8     OBJECTIVE:    Risk of Unplanned Readmission Score: 10.66     Current admission status: Inpatient    Preferred Pharmacy:   [id number] - [place], [place] - [address] [place] [id number]  Phone: [phone] Fax: [phone]    [facility] [address]  [address]  [address] [address]  Phone: [phone] Fax: [phone]    Primary Care Provider: Geovani Hollingsworth MD    Primary Insurance: MEDICARE  Secondary Insurance: [employer]    ASSESSMENT:  Active Health Care Proxies    There are no active Health Care Proxies on file.       Advance Directives  Does patient have a Health Care POA?: Yes  Does patient have Advance Directives?: Yes  Advance Directives: Power of  for health care, Power of  for finance  Primary Contact: Gareth Shankar    Readmission Root Cause  30 Day Readmission: No    Patient Information  Admitted from:: Home  Mental Status: Alert  During Assessment patient was accompanied by: Not accompanied during assessment  Assessment information provided by:: Patient  Primary Caregiver: Self  Support Systems: Self, Friends/neighbors, Family members  [facility]  Residence: [place]  What city do you live in?: [place]  Home entry access options. Select all that apply.: No steps to enter home  Type of Current Residence: [facility]  Living Arrangements: Lives Alone    Activities of Daily Living Prior to Admission  Functional Status: Independent  Completes ADLs independently?: Yes  Ambulates independently?: Yes  Does patient use assisted devices?: Yes  Assisted Devices (DME) used: Straight Cane, Walker  Does patient currently own DME?: Yes  What DME does the patient currently own?: Straight Cane, Walker  Does patient have a history of Outpatient Therapy (PT/OT)?: No  Does the patient have a history of Short-Term Rehab?: Yes  Does patient have a history of HHC?: Yes  Does patient currently have HHC?: No    Patient Information Continued  Income Source: Pension/care home  Does patient have prescription coverage?: Yes ([facility])  Does patient receive dialysis treatments?: No  Does patient have a history of substance abuse?: No  Does patient have a history of Mental Health Diagnosis?: No    Means of Transportation  Means of Transport to Appts:: Friends      Social Determinants of Health (SDOH)      Flowsheet Row Most Recent Value   Housing Stability    In the last 12 months, was there a time when you were not able to pay the mortgage or rent on time? N   In the past 12 months, how many times have you moved where you were living? 0   At any time in the past 12 months, were you homeless or living in a shelter (including now)? N   Transportation Needs    In the past 12 months, has lack of transportation kept you from medical appointments or from getting medications? no   In the past 12 months, has lack of transportation kept you from meetings, work, or from getting things needed for daily living? No   Food Insecurity    Within the past 12 months, you worried that your food would run out before you got the money to buy more. Never true   Within the past 12 months, the food you bought  just didn't last and you didn't have money to get more. Never true   Utilities    In the past 12 months has the electric, gas, oil, or water company threatened to shut off services in your home? No            DISCHARGE DETAILS:    Discharge planning discussed with:: Patient  Freedom of Choice: Yes  Comments - Freedom of Choice: SW met with pt to assess needs and discuss plans.  PT/OT evaluated pt at Level II (Moderate Resource Intensity) and STR placement is being recommended.  SW reviewed recommendations and options with pt.  Pt said she would consider rehab placement if needed.  Pt also expressed that her neighbors are very helpful so she may be able to return home.  SW talked with pt about concerns for pt's safety considering her left arm fracture. SW offered to make blanket STR referrals to initiate planning and determine available facilities.  SW also offered to talk with neighbors to discuss plans.  Pt agreeable.  SW will continue to follow to monitor progress and assist as needed.  CM contacted family/caregiver?: Yes (message left for Gareth Shankar)  Were Treatment Team discharge recommendations reviewed with patient/caregiver?: Yes  Did patient/caregiver verbalize understanding of patient care needs?: Yes  Were patient/caregiver advised of the risks associated with not following Treatment Team discharge recommendations?: Yes    Contacts  Patient Contacts: Gareth Shankar  Relationship to Patient:: Friend  Contact Method: Phone  Phone Number: [phone]  Reason/Outcome: Other (Comment) (message left)    Other Referral/Resources/Interventions Provided:  Interventions: Short Term Rehab  Referral Comments: [facility] STR referrals have been made    Treatment Team Recommendation: Short Term Rehab  Discharge Destination Plan:: Home with Home Health Care, Short Term Rehab  Transport at Discharge : Wheelchair van

## 2024-10-04 ENCOUNTER — APPOINTMENT (INPATIENT)
Dept: RADIOLOGY | Facility: HOSPITAL | Age: 89
DRG: 811 | End: 2024-10-04
Payer: MEDICARE

## 2024-10-04 LAB
ABO GROUP BLD BPU: NORMAL
ABO GROUP BLD: NORMAL
ANION GAP SERPL CALCULATED.3IONS-SCNC: 8 MMOL/L (ref 4–13)
BACTERIA UR QL AUTO: ABNORMAL /HPF
BLD GP AB SCN SERPL QL: NEGATIVE
BPU ID: NORMAL
BUN SERPL-MCNC: 16 MG/DL (ref 5–25)
CALCIUM SERPL-MCNC: 8 MG/DL (ref 8.4–10.2)
CHLORIDE SERPL-SCNC: 103 MMOL/L (ref 96–108)
CO2 SERPL-SCNC: 20 MMOL/L (ref 21–32)
CREAT SERPL-MCNC: 0.74 MG/DL (ref 0.6–1.3)
CROSSMATCH: NORMAL
ERYTHROCYTE [DISTWIDTH] IN BLOOD BY AUTOMATED COUNT: 15.9 % (ref 11.6–15.1)
GFR SERPL CREATININE-BSD FRML MDRD: 73 ML/MIN/1.73SQ M
GLUCOSE SERPL-MCNC: 79 MG/DL (ref 65–140)
HCT VFR BLD AUTO: 21.4 % (ref 34.8–46.1)
HCT VFR BLD AUTO: 26.9 % (ref 34.8–46.1)
HGB BLD-MCNC: 6.9 G/DL (ref 11.5–15.4)
HGB BLD-MCNC: 8.6 G/DL (ref 11.5–15.4)
MCH RBC QN AUTO: 31.4 PG (ref 26.8–34.3)
MCHC RBC AUTO-ENTMCNC: 32.2 G/DL (ref 31.4–37.4)
MCV RBC AUTO: 97 FL (ref 82–98)
NON-SQ EPI CELLS URNS QL MICRO: ABNORMAL /HPF
PLATELET # BLD AUTO: 470 THOUSANDS/UL (ref 149–390)
PMV BLD AUTO: 8 FL (ref 8.9–12.7)
POTASSIUM SERPL-SCNC: 3.5 MMOL/L (ref 3.5–5.3)
RBC # BLD AUTO: 2.2 MILLION/UL (ref 3.81–5.12)
RBC #/AREA URNS AUTO: ABNORMAL /HPF
RH BLD: NEGATIVE
SODIUM SERPL-SCNC: 131 MMOL/L (ref 135–147)
SPECIMEN EXPIRATION DATE: NORMAL
UNIT DISPENSE STATUS: NORMAL
UNIT PRODUCT CODE: NORMAL
UNIT PRODUCT VOLUME: 350 ML
UNIT RH: NORMAL
WBC # BLD AUTO: 7.93 THOUSAND/UL (ref 4.31–10.16)
WBC #/AREA URNS AUTO: ABNORMAL /HPF

## 2024-10-04 PROCEDURE — 86920 COMPATIBILITY TEST SPIN: CPT

## 2024-10-04 PROCEDURE — 86901 BLOOD TYPING SEROLOGIC RH(D): CPT | Performed by: NURSE PRACTITIONER

## 2024-10-04 PROCEDURE — 99232 SBSQ HOSP IP/OBS MODERATE 35: CPT | Performed by: NURSE PRACTITIONER

## 2024-10-04 PROCEDURE — 73200 CT UPPER EXTREMITY W/O DYE: CPT

## 2024-10-04 PROCEDURE — 71260 CT THORAX DX C+: CPT

## 2024-10-04 PROCEDURE — 99233 SBSQ HOSP IP/OBS HIGH 50: CPT | Performed by: INTERNAL MEDICINE

## 2024-10-04 PROCEDURE — 74177 CT ABD & PELVIS W/CONTRAST: CPT

## 2024-10-04 PROCEDURE — 86850 RBC ANTIBODY SCREEN: CPT | Performed by: NURSE PRACTITIONER

## 2024-10-04 PROCEDURE — 86900 BLOOD TYPING SEROLOGIC ABO: CPT | Performed by: NURSE PRACTITIONER

## 2024-10-04 PROCEDURE — 30233N1 TRANSFUSION OF NONAUTOLOGOUS RED BLOOD CELLS INTO PERIPHERAL VEIN, PERCUTANEOUS APPROACH: ICD-10-PCS | Performed by: FAMILY MEDICINE

## 2024-10-04 PROCEDURE — 85014 HEMATOCRIT: CPT | Performed by: NURSE PRACTITIONER

## 2024-10-04 PROCEDURE — 85018 HEMOGLOBIN: CPT | Performed by: NURSE PRACTITIONER

## 2024-10-04 PROCEDURE — 85027 COMPLETE CBC AUTOMATED: CPT | Performed by: INTERNAL MEDICINE

## 2024-10-04 PROCEDURE — 80048 BASIC METABOLIC PNL TOTAL CA: CPT | Performed by: INTERNAL MEDICINE

## 2024-10-04 PROCEDURE — P9016 RBC LEUKOCYTES REDUCED: HCPCS

## 2024-10-04 RX ADMIN — B-COMPLEX W/ C & FOLIC ACID TAB 1 TABLET: TAB at 08:59

## 2024-10-04 RX ADMIN — PANTOPRAZOLE SODIUM 8 MG/HR: 40 INJECTION, POWDER, FOR SOLUTION INTRAVENOUS at 21:17

## 2024-10-04 RX ADMIN — IRON SUCROSE 200 MG: 20 INJECTION, SOLUTION INTRAVENOUS at 08:53

## 2024-10-04 RX ADMIN — ACETAMINOPHEN 975 MG: 325 TABLET ORAL at 18:33

## 2024-10-04 RX ADMIN — ACETAMINOPHEN 975 MG: 325 TABLET ORAL at 05:50

## 2024-10-04 RX ADMIN — ATORVASTATIN CALCIUM 20 MG: 20 TABLET, FILM COATED ORAL at 21:09

## 2024-10-04 RX ADMIN — CHOLECALCIFEROL (VITAMIN D3) 10 MCG (400 UNIT) TABLET 400 UNITS: at 08:59

## 2024-10-04 RX ADMIN — BIMATOPROST 1 DROP: 0.3 SOLUTION/ DROPS OPHTHALMIC at 09:00

## 2024-10-04 RX ADMIN — FLUTICASONE PROPIONATE 1 SPRAY: 50 SPRAY, METERED NASAL at 09:02

## 2024-10-04 RX ADMIN — FLUTICASONE PROPIONATE 1 SPRAY: 50 SPRAY, METERED NASAL at 21:10

## 2024-10-04 RX ADMIN — PANTOPRAZOLE SODIUM 8 MG/HR: 40 INJECTION, POWDER, FOR SOLUTION INTRAVENOUS at 04:28

## 2024-10-04 RX ADMIN — LISINOPRIL 20 MG: 20 TABLET ORAL at 08:59

## 2024-10-04 NOTE — ASSESSMENT & PLAN NOTE
Hemoglobin 8.4 on admission, baseline appears around 11-13 with most recent hemoglobin 13.0 in April  She does report stools have been black recently  Hold home aspirin  B12 and folate WNL, iron panel consistent with JAXON  Continue IV protonix  Hemoglobin dropped to 6.9 this morning; transfuse one unit PRBC today   GI consulted -EGD demonstrated esophagitis  Patient noted to be severely iron deficient, started Venofer  Will get CT scan of left upper extremity, chest abdomen pelvis to ensure no retroperitoneal bleed or occult bleeding status post fall and fracture of humerus  Repeat cbc in am

## 2024-10-04 NOTE — PHYSICAL THERAPY NOTE
PT Cancellation Note       10/04/24 1120   Note Type   Note Type Cancelled Session   Cancel Reasons Medical status  (Pt with critically lot Hg 6.9 this AM. Getting blood transfusion this afternoon. + RN to check orthostatic vitals. Will follow-up as tolerated/appropriate.)   Licensure   [place] License Number  Tamara Jarquin [id number]

## 2024-10-04 NOTE — PROGRESS NOTES
Progress Note - Hospitalist   Name: Pattie Manning 89 y.o. female I MRN: [id number]  Unit/Bed#: 2 [address] Date of Admission: 10/2/2024   Date of Service: 10/4/2024 I Hospital Day: 2    Assessment & Plan  Left humeral fracture  Presented to ED with left arm pain after fall at home  XR L humerus shows fracture of left humeral proximal metaphysis  Continue supportive care with LUE sling  Scheduled tylenol  PT/OT  Fall precautions  Anemia  Hemoglobin 8.4 on admission, baseline appears around 11-13 with most recent hemoglobin 13.0 in April  She does report stools have been black recently  Hold home aspirin  B12 and folate WNL, iron panel consistent with JAXON  Continue IV protonix  Hemoglobin dropped to 6.9 this morning; transfuse one unit PRBC today   GI consulted -EGD demonstrated esophagitis  Patient noted to be severely iron deficient, started Venofer  Will get CT scan of left upper extremity, chest abdomen pelvis to ensure no retroperitoneal bleed or occult bleeding status post fall and fracture of humerus  Repeat cbc in am  Syncope  While working with PT in the ED, patient said she needed to sit down and reportedly became unresponsive and was assisted to chair  Rapid response called 10/2 after patient had another syncopal episode while ambulating to the bathroom  EKG shows NSR with PACs  Troponin negative  ECHO shows EF 65%, systolic function normal, diastolic function normal for age, no major valvular abnormalities  Check orthostatic vitals when able  Monitor on telemetry  Likely in setting of acute anemia/GI bleed  Leukocytosis  Possibly reactive in setting of fall and acute fracture  Patient is afebrile and hemodynamically stable  Procalcitonin negative  CXR shows pneumonia vs atelectasis  Given improvement of WBC without antibiotics, doubt acute infection  UA is pending though denies urinary symptoms  Monitor cbc  Primary hypertension  Continue lisinopril  Dyslipidemia  Continue lipitor  Age-related  osteoporosis without current pathological fracture  Continue calcium and vitamin D supplementation    VTE Pharmacologic Prophylaxis: VTE Score: 3 Moderate Risk (Score 3-4) - Pharmacological DVT Prophylaxis Ordered: contraindicated anemia .    Mobility:   Basic Mobility Inpatient Raw Score: 13  JH-HLM Goal: 4: Move to chair/commode  JH-HLM Achieved: 4: Move to chair/commode  JH-HLM Goal achieved. Continue to encourage appropriate mobility.    Patient Centered Rounds: I performed bedside rounds with nursing staff today.   Discussions with Specialists or Other Care Team Provider: Multidisciplinary team     Education and Discussions with Family / Patient: Updated  (friend) via phone.    Current Length of Stay: 2 day(s)  Current Patient Status: Inpatient   Certification Statement: The patient will continue to require additional inpatient hospital stay due to PT/OT, blood transfusion, repeat hemoglobin, CT scan  Discharge Plan: Anticipate discharge in 48-72 hrs to discharge location to be determined pending rehab evaluations.    Code Status: Level 1 - Full Code    Subjective   Patient seen sitting up in bed resting comfortably.  Reports not too much pain in her left arm, is compliant with wearing her sling.  Had just had breakfast, good appetite no nausea or vomiting.  She is looking forward to working with physical therapy.  We did talk about her low hemoglobin and her low iron stores which we will be replating.    Objective :  Temp:  [97.8 °F (36.6 °C)-98.8 °F (37.1 °C)] 98.3 °F (36.8 °C)  HR:  [] 90  BP: (110-144)/(56-77) 141/73  Resp:  [14-18] 18  SpO2:  [93 %-96 %] 96 %  O2 Device: None (Room air)    Body mass index is 22.66 kg/m².     Input and Output Summary (last 24 hours):   No intake or output data in the 24 hours ending 10/04/24 1727    Physical Exam  Vitals reviewed.   Constitutional:       Appearance: Normal appearance.   HENT:      Head: Normocephalic.      Nose: Nose normal.       Mouth/Throat:      Mouth: Mucous membranes are moist.   Eyes:      Extraocular Movements: Extraocular movements intact.      Conjunctiva/sclera: Conjunctivae normal.   Cardiovascular:      Rate and Rhythm: Normal rate and regular rhythm.      Pulses: Normal pulses.   Pulmonary:      Effort: Pulmonary effort is normal.      Breath sounds: Normal breath sounds.   Abdominal:      General: Bowel sounds are normal. There is no distension.      Palpations: Abdomen is soft.      Tenderness: There is no abdominal tenderness.   Genitourinary:     Comments: Voiding spontaneously   Musculoskeletal:      Cervical back: Normal range of motion.      Comments: LUE in sling; good cap refill, good sensation, radial pulse intact.    Skin:     General: Skin is warm and dry.      Capillary Refill: Capillary refill takes less than 2 seconds.      Coloration: Skin is pale.   Neurological:      General: No focal deficit present.      Mental Status: She is alert and oriented to person, place, and time.   Psychiatric:         Mood and Affect: Mood normal.         Behavior: Behavior normal.         Thought Content: Thought content normal.         Judgment: Judgment normal.           Lines/Drains:        Telemetry:  Telemetry Orders (From admission, onward)               24 Hour Telemetry Monitoring  Continuous x 24 Hours (Telem)           Question:  Reason for 24 Hour Telemetry  Answer:  Syncope suspected to be cardiac in origin                     Telemetry Reviewed: Normal Sinus Rhythm  Indication for Continued Telemetry Use: No indication for continued use. Will discontinue.                Lab Results: I have reviewed the following results:   Results from last 7 days   Lab Units 10/04/24  0440 10/03/24  0548 10/02/24  1507   WBC Thousand/uL 7.93   < > 20.03*   HEMOGLOBIN g/dL 6.9*   < > 8.4*   HEMATOCRIT % 21.4*   < > 26.6*   PLATELETS Thousands/uL 470*   < > 522*   SEGS PCT %  --   --  89*   LYMPHO PCT %  --   --  5*   MONO PCT %   --   --  5   EOS PCT %  --   --  0    < > = values in this interval not displayed.     Results from last 7 days   Lab Units 10/04/24  0440 10/03/24  0548 10/02/24  1507   SODIUM mmol/L 131*   < > 132*   POTASSIUM mmol/L 3.5   < > 3.7   CHLORIDE mmol/L 103   < > 102   CO2 mmol/L 20*   < > 21   BUN mg/dL 16   < > 15   CREATININE mg/dL 0.74   < > 0.77   ANION GAP mmol/L 8   < > 9   CALCIUM mg/dL 8.0*   < > 9.1   ALBUMIN g/dL  --   --  3.7   TOTAL BILIRUBIN mg/dL  --   --  0.32   ALK PHOS U/L  --   --  56   ALT U/L  --   --  12   AST U/L  --   --  18   GLUCOSE RANDOM mg/dL 79   < > 128    < > = values in this interval not displayed.     Results from last 7 days   Lab Units 10/02/24  1507   INR  1.04     Results from last 7 days   Lab Units 10/02/24  2110 10/02/24  1505   POC GLUCOSE mg/dl 133 127         Results from last 7 days   Lab Units 10/03/24  0548   PROCALCITONIN ng/ml 0.07       Recent Cultures (last 7 days):         Imaging Results Review: No pertinent imaging studies reviewed.  Other Study Results Review: No additional pertinent studies reviewed.    Last 24 Hours Medication List:     Current Facility-Administered Medications:     acetaminophen (TYLENOL) tablet 975 mg, Q8H MAMIE    atorvastatin (LIPITOR) tablet 20 mg, HS    bimatoprost (LUMIGAN) 0.03 % ophthalmic drops 1 drop, Daily    Cholecalciferol (VITAMIN D3) tablet 400 Units, Daily    fluticasone (FLONASE) 50 mcg/act nasal spray 1 spray, BID    iron sucrose (VENOFER) 200 mg in sodium chloride 0.9 % 100 mL IVPB, Daily, Last Rate: 200 mg (10/04/24 0853)    lisinopril (ZESTRIL) tablet 20 mg, Daily    multivitamin stress formula tablet 1 tablet, Daily    pantoprazole (PROTONIX) 80 mg in sodium chloride 0.9 % 100 mL infusion, Continuous, Last Rate: 8 mg/hr (10/04/24 1638)    Administrative Statements   Today, Patient Was Seen By: MARGE Estrada  I have spent a total time of greater than 45 minutes in caring for this patient on the day of the  visit/encounter including Diagnostic results, Counseling / Coordination of care, Documenting in the medical record, Reviewing / ordering tests, medicine, procedures  , and Communicating with other healthcare professionals .    **Please Note: This note may have been constructed using a voice recognition system.**

## 2024-10-04 NOTE — OCCUPATIONAL THERAPY NOTE
OT TREATMENT     10/04/24 1103   Note Type   Note Type Cancelled Session   Cancel Reasons Medical status  (pt with HgB 6.9. Will follow.)   Licensure   [place] License Number  Tamara Carpenter MS OTR/L [id number]

## 2024-10-04 NOTE — PLAN OF CARE
Problem: PAIN - ADULT  Goal: Verbalizes/displays adequate comfort level or baseline comfort level  Description: Interventions:  - Encourage patient to monitor pain and request assistance  - Assess pain using appropriate pain scale  - Administer analgesics based on type and severity of pain and evaluate response  - Implement non-pharmacological measures as appropriate and evaluate response  - Consider cultural and social influences on pain and pain management  - Notify physician/advanced practitioner if interventions unsuccessful or patient reports new pain  Outcome: Progressing     Problem: INFECTION - ADULT  Goal: Absence or prevention of progression during hospitalization  Description: INTERVENTIONS:  - Assess and monitor for signs and symptoms of infection  - Monitor lab/diagnostic results  - Monitor all insertion sites, i.e. indwelling lines, tubes, and drains  - Monitor endotracheal if appropriate and nasal secretions for changes in amount and color  - [place] appropriate cooling/warming therapies per order  - Administer medications as ordered  - Instruct and encourage patient and family to use good hand hygiene technique  - Identify and instruct in appropriate isolation precautions for identified infection/condition  Outcome: Progressing  Goal: Absence of fever/infection during neutropenic period  Description: INTERVENTIONS:  - Monitor WBC    Outcome: Progressing     Problem: SAFETY ADULT  Goal: Patient will remain free of falls  Description: INTERVENTIONS:  - Educate patient/family on patient safety including physical limitations  - Instruct patient to call for assistance with activity   - Consult OT/PT to assist with strengthening/mobility   - Keep Call bell within reach  - Keep bed low and locked with side rails adjusted as appropriate  - Keep care items and personal belongings within reach  - Initiate and maintain comfort rounds  - Make Fall Risk Sign visible to staff  - Offer Toileting every 2 Hours,  in advance of need  - Initiate/Maintain bed alarm  - Obtain necessary fall risk management equipment: walker  - Apply yellow socks and bracelet for high fall risk patients  - Consider moving patient to room near nurses station  Outcome: Progressing  Goal: Maintain or return to baseline ADL function  Description: INTERVENTIONS:  -  Assess patient's ability to carry out ADLs; assess patient's baseline for ADL function and identify physical deficits which impact ability to perform ADLs (bathing, care of mouth/teeth, toileting, grooming, dressing, etc.)  - Assess/evaluate cause of self-care deficits   - Assess range of motion  - Assess patient's mobility; develop plan if impaired  - Assess patient's need for assistive devices and provide as appropriate  - Encourage maximum independence but intervene and supervise when necessary  - Involve family in performance of ADLs  - Assess for home care needs following discharge   - Consider OT consult to assist with ADL evaluation and planning for discharge  - Provide patient education as appropriate  Outcome: Progressing  Goal: Maintains/Returns to pre admission functional level  Description: INTERVENTIONS:  - Perform AM-PAC 6 Click Basic Mobility/ Daily Activity assessment daily.  - Set and communicate daily mobility goal to care team and patient/family/caregiver.   - Collaborate with rehabilitation services on mobility goals if consulted  - Perform Range of Motion 3 times a day.  - Reposition patient every 2 hours.  - Dangle patient 3 times a day  - Stand patient 3 times a day  - Ambulate patient 3 times a day  - Out of bed to chair 3 times a day   - Out of bed for meals 3 times a day  - Out of bed for toileting  - Record patient progress and toleration of activity level   Outcome: Progressing     Problem: DISCHARGE PLANNING  Goal: Discharge to home or other facility with appropriate resources  Description: INTERVENTIONS:  - Identify barriers to discharge w/patient and  caregiver  - Arrange for needed discharge resources and transportation as appropriate  - Identify discharge learning needs (meds, wound care, etc.)  - Arrange for interpretive services to assist at discharge as needed  - Refer to Case Management Department for coordinating discharge planning if the patient needs post-hospital services based on physician/advanced practitioner order or complex needs related to functional status, cognitive ability, or social support system  Outcome: Progressing     Problem: Knowledge Deficit  Goal: Patient/family/caregiver demonstrates understanding of disease process, treatment plan, medications, and discharge instructions  Description: Complete learning assessment and assess knowledge base.  Interventions:  - Provide teaching at level of understanding  - Provide teaching via preferred learning methods  Outcome: Progressing     Problem: Prexisting or High Potential for Compromised Skin Integrity  Goal: Skin integrity is maintained or improved  Description: INTERVENTIONS:  - Identify patients at risk for skin breakdown  - Assess and monitor skin integrity  - Assess and monitor nutrition and hydration status  - Monitor labs   - Assess for incontinence   - Turn and reposition patient  - Assist with mobility/ambulation  - Relieve pressure over bony prominences  - Avoid friction and shearing  - Provide appropriate hygiene as needed including keeping skin clean and dry  - Evaluate need for skin moisturizer/barrier cream  - Collaborate with interdisciplinary team   - Patient/family teaching  - Consider wound care consult   Outcome: Progressing

## 2024-10-04 NOTE — PROGRESS NOTES
"Progress Note - Pattie Manning 89 y.o. female MRN: [id number]    Unit/Bed#: [address] [place] [address] Encounter: [id number]        Assessment/Plan:  Anemia  Hemoglobin 8.4 on admission, trended down to 6.9 , baseline appears around 11-13 with most recent hemoglobin 13.0 in April  She does report stools have been black recently  Home aspirin was held  EGD was performed yesterday showed severe grade D erosive esophagitis, likely source of patient's anemia melena, large hiatal hernia and normal duodenum  Protonix drip ordered  1 unit of packed RBCs ordered for today  Monitor serial H&H  Diet advanced to full liquids    Subjective:   Patient is lying in bed.  She denies having any bowel movements for the past 3 days.  Spoke with nurse and patient will get 1 unit of packed RBCs his hemoglobin did trend down to 6.9.  Patient denies any significant complaints, denies abdominal pain, no nausea or vomiting.    Objective:     Vitals: /63   Pulse 84   Temp 97.8 °F (36.6 °C)   Resp 18   Ht 5' 4\" (1.626 m)   Wt 59.9 kg (132 lb)   LMP  (LMP Unknown)   SpO2 96%   BMI 22.66 kg/m²       Physical Exam:  Gen-alert no acute distress  Abd-soft positive bowel sounds nontender nondistended       Lab, Imaging and other studies:   Recent Results (from the past 72 hour(s))   ECG 12 lead    Collection Time: 10/02/24  3:04 PM   Result Value Ref Range    Ventricular Rate 76 BPM    Atrial Rate 76 BPM    NH Interval 152 ms    QRSD Interval 78 ms    QT Interval 404 ms    QTC Interval 454 ms    P Axis 72 degrees    QRS Axis 69 degrees    T Wave Axis 65 degrees   Fingerstick Glucose (POCT)    Collection Time: 10/02/24  3:05 PM   Result Value Ref Range    POC Glucose 127 65 - 140 mg/dl   ECG 12 lead    Collection Time: 10/02/24  3:05 PM   Result Value Ref Range    Ventricular Rate 82 BPM    Atrial Rate 82 BPM    NH Interval 160 ms    QRSD Interval 76 ms    QT Interval 384 ms    QTC Interval 448 ms    P [place] 72 degrees    QRS Axis 69 degrees    T " Wave Axis 64 degrees   ECG 12 lead    Collection Time: 10/02/24  3:06 PM   Result Value Ref Range    Ventricular Rate 80 BPM    Atrial Rate 80 BPM    [place] Interval 154 ms    QRSD Interval 78 ms    QT Interval 402 ms    QTC Interval 463 ms    P [place] 69 degrees    QRS Axis 68 degrees    T Wave Axis 63 degrees   CBC and differential    Collection Time: 10/02/24  3:07 PM   Result Value Ref Range    WBC 20.03 (H) 4.31 - 10.16 Thousand/uL    RBC 2.74 (L) 3.81 - 5.12 Million/uL    Hemoglobin 8.4 (L) 11.5 - 15.4 g/dL    Hematocrit 26.6 (L) 34.8 - 46.1 %    MCV 97 82 - 98 fL    MCH 30.7 26.8 - 34.3 pg    MCHC 31.6 31.4 - 37.4 g/dL    RDW 15.5 (H) 11.6 - 15.1 %    MPV 7.9 (L) 8.9 - 12.7 fL    Platelets 522 (H) 149 - 390 Thousands/uL    nRBC 0 /100 WBCs    Segmented % 89 (H) 43 - 75 %    Immature Grans % 1 0 - 2 %    Lymphocytes % 5 (L) 14 - 44 %    Monocytes % 5 4 - 12 %    Eosinophils Relative 0 0 - 6 %    Basophils Relative 0 0 - 1 %    Absolute Neutrophils 17.95 (H) 1.85 - 7.62 Thousands/µL    Absolute Immature Grans 0.12 0.00 - 0.20 Thousand/uL    Absolute Lymphocytes 0.94 0.60 - 4.47 Thousands/µL    Absolute Monocytes 0.99 0.17 - 1.22 Thousand/µL    Eosinophils Absolute 0.00 0.00 - 0.61 Thousand/µL    Basophils Absolute 0.03 0.00 - 0.10 Thousands/µL   Protime-INR    Collection Time: 10/02/24  3:07 PM   Result Value Ref Range    Protime 14.1 12.3 - 15.0 seconds    INR 1.04 0.85 - 1.19   APTT    Collection Time: 10/02/24  3:07 PM   Result Value Ref Range    PTT 25 23 - 34 seconds   Comprehensive metabolic panel    Collection Time: 10/02/24  3:07 PM   Result Value Ref Range    Sodium 132 (L) 135 - 147 mmol/L    Potassium 3.7 3.5 - 5.3 mmol/L    Chloride 102 96 - 108 mmol/L    CO2 21 21 - 32 mmol/L    ANION GAP 9 4 - 13 mmol/L    BUN 15 5 - 25 mg/dL    Creatinine 0.77 0.60 - 1.30 mg/dL    Glucose 128 65 - 140 mg/dL    Calcium 9.1 8.4 - 10.2 mg/dL    AST 18 13 - 39 U/L    ALT 12 7 - 52 U/L    Alkaline Phosphatase 56 34 - 104  "U/L    Total Protein 6.3 (L) 6.4 - 8.4 g/dL    Albumin 3.7 3.5 - 5.0 g/dL    Total Bilirubin 0.32 0.20 - 1.00 mg/dL    eGFR 69 ml/min/1.73sq m   Magnesium    Collection Time: 10/02/24  3:07 PM   Result Value Ref Range    Magnesium 2.2 1.9 - 2.7 mg/dL   HS Troponin 0hr (reflex protocol)    Collection Time: 10/02/24  3:07 PM   Result Value Ref Range    hs TnI 0hr 4 \"Refer to ACS Flowchart\"- see link ng/L   Folate    Collection Time: 10/02/24  3:07 PM   Result Value Ref Range    Folate >22.3 >5.9 ng/mL   Vitamin B12    Collection Time: 10/02/24  3:07 PM   Result Value Ref Range    Vitamin B-12 846 180 - 914 pg/mL   TIBC Panel (incl. Iron, TIBC, % Iron Saturation)    Collection Time: 10/02/24  3:07 PM   Result Value Ref Range    Iron Saturation 7 (L) 15 - 50 %    TIBC 294 250 - 450 ug/dL    Iron 20 (L) 50 - 212 ug/dL    UIBC 274 155 - 355 ug/dL   Ferritin    Collection Time: 10/02/24  3:07 PM   Result Value Ref Range    Ferritin 47 11 - 307 ng/mL   ECG 12 lead    Collection Time: 10/02/24  3:09 PM   Result Value Ref Range    Ventricular Rate 82 BPM    Atrial Rate 82 BPM    CA Interval 150 ms    QRSD Interval 82 ms    QT Interval 390 ms    QTC Interval 455 ms    P Axis 79 degrees    QRS Axis 69 degrees    T Wave Axis 63 degrees   ECG 12 lead    Collection Time: 10/02/24  3:10 PM   Result Value Ref Range    Ventricular Rate 82 BPM    Atrial Rate 82 BPM    CA Interval 154 ms    QRSD Interval 76 ms    QT Interval 396 ms    QTC Interval 462 ms    P Axis 73 degrees    QRS Axis 69 degrees    T Wave Axis 67 degrees   ECG 12 lead    Collection Time: 10/02/24  3:10 PM   Result Value Ref Range    Ventricular Rate 82 BPM    Atrial Rate 82 BPM    CA Interval 152 ms    QRSD Interval 80 ms    QT Interval 392 ms    QTC Interval 457 ms    P Axis 72 degrees    QRS Axis 69 degrees    T Wave [place] 68 degrees   HS Troponin I 2hr    Collection Time: 10/02/24  5:15 PM   Result Value Ref Range    hs TnI 2hr 4 \"Refer to ACS Flowchart\"- see link " "ng/L    Delta 2hr hsTnI 0 <20 ng/L   HS Troponin I 4hr    Collection Time: 10/02/24  8:30 PM   Result Value Ref Range    hs TnI 4hr 6 \"Refer to ACS Flowchart\"- see link ng/L    Delta 4hr hsTnI 2 <20 ng/L   Fingerstick Glucose (POCT)    Collection Time: 10/02/24  9:10 PM   Result Value Ref Range    POC Glucose 133 65 - 140 mg/dl   ECG 12 lead    Collection Time: 10/02/24  9:42 PM   Result Value Ref Range    Ventricular Rate 80 BPM    Atrial Rate 80 BPM    ID Interval 162 ms    QRSD Interval 84 ms    QT Interval 404 ms    QTC Interval 465 ms    P Axis 63 degrees    QRS Axis 54 degrees    T Wave Axis 55 degrees   ECG 12 lead    Collection Time: 10/02/24  9:43 PM   Result Value Ref Range    Ventricular Rate 81 BPM    Atrial Rate 81 BPM    ID Interval 160 ms    QRSD Interval 82 ms    QT Interval 414 ms    QTC Interval 480 ms    P Axis 62 degrees    QRS Axis 55 degrees    T Wave Axis 55 degrees   Basic metabolic panel    Collection Time: 10/03/24  5:48 AM   Result Value Ref Range    Sodium 132 (L) 135 - 147 mmol/L    Potassium 3.7 3.5 - 5.3 mmol/L    Chloride 104 96 - 108 mmol/L    CO2 21 21 - 32 mmol/L    ANION GAP 7 4 - 13 mmol/L    BUN 16 5 - 25 mg/dL    Creatinine 0.70 0.60 - 1.30 mg/dL    Glucose 108 65 - 140 mg/dL    Calcium 8.3 (L) 8.4 - 10.2 mg/dL    eGFR 78 ml/min/1.73sq m   CBC (With Platelets)    Collection Time: 10/03/24  5:48 AM   Result Value Ref Range    WBC 7.90 4.31 - 10.16 Thousand/uL    RBC 2.39 (L) 3.81 - 5.12 Million/uL    Hemoglobin 7.5 (L) 11.5 - 15.4 g/dL    Hematocrit 22.9 (L) 34.8 - 46.1 %    MCV 96 82 - 98 fL    MCH 31.4 26.8 - 34.3 pg    MCHC 32.8 31.4 - 37.4 g/dL    RDW 15.5 (H) 11.6 - 15.1 %    Platelets 469 (H) 149 - 390 Thousands/uL    MPV 8.1 (L) 8.9 - 12.7 fL   Procalcitonin    Collection Time: 10/03/24  5:48 AM   Result Value Ref Range    Procalcitonin 0.07 <=0.25 ng/ml   Echo complete w/ contrast if indicated    Collection Time: 10/03/24  7:42 AM   Result Value Ref Range    BSA 1.64 m2 "    A4C EF 69 %    LVIDd 3.30 cm    LVIDS 2.40 cm    IVSd 1.10 cm    LVPWd 1.00 cm    LVOT peak VTI 29.4 cm    FS 27 28 - 44    MV E' Tissue Velocity Septal 6 cm/s    LA Volume Index (BP) 23.8 mL/m2    E/A ratio 0.74     E wave deceleration time 175 ms    MV Peak E Mookie 63 cm/s    MV Peak A Mookie 0.85 m/s    AV LVOT peak gradient 8 mmHg    LVOT peak mookie 1.44 m/s    RVID d 3.8 cm    Tricuspid annular plane systolic excursion 2.90 cm    LA size 3.3 cm    LA length (A2C) 4.60 cm    MAGDY A4C 16.4 cm2    LA volume (BP) 39 mL    RA 2D Volume 39.0 mL    RAA A4C 15 cm2    LVOT mn grad 5.0 mmHg    MV stenosis pressure 1/2 time 51 ms    MV valve area p 1/2 method 4.31     TR Peak Mookie 2.6 m/s    Triscuspid Valve Regurgitation Peak Gradient 27.0 mmHg    Ao root 3.10 cm    Asc Ao 2.5 cm    Tricuspid valve peak regurgitation velocity 2.60 m/s    Left ventricular stroke volume (2D) 25.00 mL    IVS 1.1 cm    LEFT VENTRICLE SYSTOLIC VOLUME (MOD BIPLANE) 2D 19 mL    LV DIASTOLIC VOLUME (MOD BIPLANE) 2D 45 mL    Left Atrium Area-systolic Four Chamber 18 cm2    Left Atrium Area-systolic Apical Two Chamber 12.3 cm2    LVSV, 2D 25 mL   Hemoglobin and hematocrit, blood    Collection Time: 10/03/24  6:00 PM   Result Value Ref Range    Hemoglobin 7.1 (L) 11.5 - 15.4 g/dL    Hematocrit 22.2 (L) 34.8 - 46.1 %   UA (URINE) with reflex to Scope    Collection Time: 10/03/24 11:10 PM   Result Value Ref Range    Color, UA Yellow     Clarity, UA Clear     Specific Gravity, UA 1.025 1.005 - 1.030    pH, UA 5.5 4.5, 5.0, 5.5, 6.0, 6.5, 7.0, 7.5, 8.0    Leukocytes, UA Large (A) Negative    Nitrite, UA Negative Negative    Protein, UA Trace (A) Negative mg/dl    Glucose, UA Negative Negative mg/dl    Ketones, UA 10 (1+) (A) Negative mg/dl    Urobilinogen, UA <2.0 <2.0 mg/dl mg/dl    Bilirubin, UA Negative Negative    Occult Blood, UA Negative Negative   Urine Microscopic    Collection Time: 10/03/24 11:10 PM   Result Value Ref Range    RBC, UA 0-1 None  Seen, 0-1, 1-2, 2-4, 0-5 /hpf    WBC, UA 10-20 (A) None Seen, 0-1, 1-2, 0-5, 2-4 /hpf    Epithelial Cells Occasional None Seen, Occasional /hpf    Bacteria, UA Innumerable (A) None Seen, Occasional /hpf   CBC    Collection Time: 10/04/24  4:40 AM   Result Value Ref Range    WBC 7.93 4.31 - 10.16 Thousand/uL    RBC 2.20 (L) 3.81 - 5.12 Million/uL    Hemoglobin 6.9 (L) 11.5 - 15.4 g/dL    Hematocrit 21.4 (L) 34.8 - 46.1 %    MCV 97 82 - 98 fL    MCH 31.4 26.8 - 34.3 pg    MCHC 32.2 31.4 - 37.4 g/dL    RDW 15.9 (H) 11.6 - 15.1 %    Platelets 470 (H) 149 - 390 Thousands/uL    MPV 8.0 (L) 8.9 - 12.7 fL   Basic metabolic panel    Collection Time: 10/04/24  4:40 AM   Result Value Ref Range    Sodium 131 (L) 135 - 147 mmol/L    Potassium 3.5 3.5 - 5.3 mmol/L    Chloride 103 96 - 108 mmol/L    CO2 20 (L) 21 - 32 mmol/L    ANION GAP 8 4 - 13 mmol/L    BUN 16 5 - 25 mg/dL    Creatinine 0.74 0.60 - 1.30 mg/dL    Glucose 79 65 - 140 mg/dL    Calcium 8.0 (L) 8.4 - 10.2 mg/dL    eGFR 73 ml/min/1.73sq m

## 2024-10-05 PROBLEM — D62 ACUTE BLOOD LOSS ANEMIA: Status: ACTIVE | Noted: 2024-10-02

## 2024-10-05 PROBLEM — K20.90 ESOPHAGITIS: Status: ACTIVE | Noted: 2024-10-05

## 2024-10-05 LAB
ABO GROUP BLD BPU: NORMAL
ANION GAP SERPL CALCULATED.3IONS-SCNC: 8 MMOL/L (ref 4–13)
BPU ID: NORMAL
BUN SERPL-MCNC: 11 MG/DL (ref 5–25)
CALCIUM SERPL-MCNC: 8.1 MG/DL (ref 8.4–10.2)
CHLORIDE SERPL-SCNC: 105 MMOL/L (ref 96–108)
CO2 SERPL-SCNC: 22 MMOL/L (ref 21–32)
CREAT SERPL-MCNC: 0.69 MG/DL (ref 0.6–1.3)
CROSSMATCH: NORMAL
ERYTHROCYTE [DISTWIDTH] IN BLOOD BY AUTOMATED COUNT: 17.1 % (ref 11.6–15.1)
GFR SERPL CREATININE-BSD FRML MDRD: 78 ML/MIN/1.73SQ M
GLUCOSE SERPL-MCNC: 94 MG/DL (ref 65–140)
HCT VFR BLD AUTO: 26.7 % (ref 34.8–46.1)
HCT VFR BLD AUTO: 27.7 % (ref 34.8–46.1)
HGB BLD-MCNC: 8.7 G/DL (ref 11.5–15.4)
HGB BLD-MCNC: 9.2 G/DL (ref 11.5–15.4)
MCH RBC QN AUTO: 30.7 PG (ref 26.8–34.3)
MCHC RBC AUTO-ENTMCNC: 33.2 G/DL (ref 31.4–37.4)
MCV RBC AUTO: 92 FL (ref 82–98)
PLATELET # BLD AUTO: 496 THOUSANDS/UL (ref 149–390)
PMV BLD AUTO: 8 FL (ref 8.9–12.7)
POTASSIUM SERPL-SCNC: 3.7 MMOL/L (ref 3.5–5.3)
RBC # BLD AUTO: 3 MILLION/UL (ref 3.81–5.12)
SODIUM SERPL-SCNC: 135 MMOL/L (ref 135–147)
UNIT DISPENSE STATUS: NORMAL
UNIT PRODUCT CODE: NORMAL
UNIT PRODUCT VOLUME: 350 ML
UNIT RH: NORMAL
WBC # BLD AUTO: 8.56 THOUSAND/UL (ref 4.31–10.16)

## 2024-10-05 PROCEDURE — 85018 HEMOGLOBIN: CPT | Performed by: NURSE PRACTITIONER

## 2024-10-05 PROCEDURE — 90662 IIV NO PRSV INCREASED AG IM: CPT | Performed by: FAMILY MEDICINE

## 2024-10-05 PROCEDURE — 99232 SBSQ HOSP IP/OBS MODERATE 35: CPT | Performed by: NURSE PRACTITIONER

## 2024-10-05 PROCEDURE — 97110 THERAPEUTIC EXERCISES: CPT

## 2024-10-05 PROCEDURE — 97530 THERAPEUTIC ACTIVITIES: CPT

## 2024-10-05 PROCEDURE — 85014 HEMATOCRIT: CPT | Performed by: NURSE PRACTITIONER

## 2024-10-05 PROCEDURE — 85027 COMPLETE CBC AUTOMATED: CPT | Performed by: NURSE PRACTITIONER

## 2024-10-05 PROCEDURE — G0008 ADMIN INFLUENZA VIRUS VAC: HCPCS | Performed by: FAMILY MEDICINE

## 2024-10-05 PROCEDURE — 97116 GAIT TRAINING THERAPY: CPT

## 2024-10-05 PROCEDURE — 80048 BASIC METABOLIC PNL TOTAL CA: CPT | Performed by: NURSE PRACTITIONER

## 2024-10-05 RX ADMIN — PANTOPRAZOLE SODIUM 8 MG/HR: 40 INJECTION, POWDER, FOR SOLUTION INTRAVENOUS at 20:38

## 2024-10-05 RX ADMIN — PANTOPRAZOLE SODIUM 8 MG/HR: 40 INJECTION, POWDER, FOR SOLUTION INTRAVENOUS at 06:38

## 2024-10-05 RX ADMIN — INFLUENZA A VIRUS A/VICTORIA/4897/2022 IVR-238 (H1N1) ANTIGEN (FORMALDEHYDE INACTIVATED), INFLUENZA A VIRUS A/CALIFORNIA/122/2022 SAN-022 (H3N2) ANTIGEN (FORMALDEHYDE INACTIVATED), AND INFLUENZA B VIRUS B/MICHIGAN/01/2021 ANTIGEN (FORMALDEHYDE INACTIVATED) 0.5 ML: 60; 60; 60 INJECTION, SUSPENSION INTRAMUSCULAR at 15:00

## 2024-10-05 RX ADMIN — ACETAMINOPHEN 975 MG: 325 TABLET ORAL at 15:01

## 2024-10-05 RX ADMIN — ACETAMINOPHEN 975 MG: 325 TABLET ORAL at 21:34

## 2024-10-05 RX ADMIN — ATORVASTATIN CALCIUM 20 MG: 20 TABLET, FILM COATED ORAL at 21:34

## 2024-10-05 RX ADMIN — FLUTICASONE PROPIONATE 1 SPRAY: 50 SPRAY, METERED NASAL at 09:57

## 2024-10-05 RX ADMIN — ACETAMINOPHEN 975 MG: 325 TABLET ORAL at 05:21

## 2024-10-05 RX ADMIN — B-COMPLEX W/ C & FOLIC ACID TAB 1 TABLET: TAB at 09:57

## 2024-10-05 RX ADMIN — IRON SUCROSE 200 MG: 20 INJECTION, SOLUTION INTRAVENOUS at 10:41

## 2024-10-05 RX ADMIN — LISINOPRIL 20 MG: 20 TABLET ORAL at 09:57

## 2024-10-05 RX ADMIN — FLUTICASONE PROPIONATE 1 SPRAY: 50 SPRAY, METERED NASAL at 21:32

## 2024-10-05 RX ADMIN — BIMATOPROST 1 DROP: 0.3 SOLUTION/ DROPS OPHTHALMIC at 09:56

## 2024-10-05 RX ADMIN — IOHEXOL 100 ML: 350 INJECTION, SOLUTION INTRAVENOUS at 00:09

## 2024-10-05 RX ADMIN — CHOLECALCIFEROL (VITAMIN D3) 10 MCG (400 UNIT) TABLET 400 UNITS: at 09:57

## 2024-10-05 NOTE — PLAN OF CARE
Problem: PAIN - ADULT  Goal: Verbalizes/displays adequate comfort level or baseline comfort level  Description: Interventions:  - Encourage patient to monitor pain and request assistance  - Assess pain using appropriate pain scale  - Administer analgesics based on type and severity of pain and evaluate response  - Implement non-pharmacological measures as appropriate and evaluate response  - Consider cultural and social influences on pain and pain management  - Notify physician/advanced practitioner if interventions unsuccessful or patient reports new pain  Outcome: Progressing     Problem: INFECTION - ADULT  Goal: Absence or prevention of progression during hospitalization  Description: INTERVENTIONS:  - Assess and monitor for signs and symptoms of infection  - Monitor lab/diagnostic results  - Monitor all insertion sites, i.e. indwelling lines, tubes, and drains  - Monitor endotracheal if appropriate and nasal secretions for changes in amount and color  - [place] appropriate cooling/warming therapies per order  - Administer medications as ordered  - Instruct and encourage patient and family to use good hand hygiene technique  - Identify and instruct in appropriate isolation precautions for identified infection/condition  Outcome: Progressing  Goal: Absence of fever/infection during neutropenic period  Description: INTERVENTIONS:  - Monitor WBC    Outcome: Progressing     Problem: SAFETY ADULT  Goal: Patient will remain free of falls  Description: INTERVENTIONS:  - Educate patient/family on patient safety including physical limitations  - Instruct patient to call for assistance with activity   - Consult OT/PT to assist with strengthening/mobility   - Keep Call bell within reach  - Keep bed low and locked with side rails adjusted as appropriate  - Keep care items and personal belongings within reach  - Initiate and maintain comfort rounds  - Make Fall Risk Sign visible to staff  - Offer Toileting every  Hours,  in advance of need  - Initiate/Maintain alarm  - Obtain necessary fall risk management equipment:   - Apply yellow socks and bracelet for high fall risk patients  - Consider moving patient to room near nurses station  Outcome: Progressing  Goal: Maintain or return to baseline ADL function  Description: INTERVENTIONS:  -  Assess patient's ability to carry out ADLs; assess patient's baseline for ADL function and identify physical deficits which impact ability to perform ADLs (bathing, care of mouth/teeth, toileting, grooming, dressing, etc.)  - Assess/evaluate cause of self-care deficits   - Assess range of motion  - Assess patient's mobility; develop plan if impaired  - Assess patient's need for assistive devices and provide as appropriate  - Encourage maximum independence but intervene and supervise when necessary  - Involve family in performance of ADLs  - Assess for home care needs following discharge   - Consider OT consult to assist with ADL evaluation and planning for discharge  - Provide patient education as appropriate  Outcome: Progressing  Goal: Maintains/Returns to pre admission functional level  Description: INTERVENTIONS:  - Perform AM-PAC 6 Click Basic Mobility/ Daily Activity assessment daily.  - Set and communicate daily mobility goal to care team and patient/family/caregiver.   - Collaborate with rehabilitation services on mobility goals if consulted  - Perform Range of Motion  times a day.  - Reposition patient every  hours.  - Dangle patient  times a day  - Stand patient  times a day  - Ambulate patient  times a day  - Out of bed to chair  times a day   - Out of bed for meals times a day  - Out of bed for toileting  - Record patient progress and toleration of activity level   Outcome: Progressing     Problem: DISCHARGE PLANNING  Goal: Discharge to home or other facility with appropriate resources  Description: INTERVENTIONS:  - Identify barriers to discharge w/patient and caregiver  - Arrange for  needed discharge resources and transportation as appropriate  - Identify discharge learning needs (meds, wound care, etc.)  - Arrange for interpretive services to assist at discharge as needed  - Refer to Case Management Department for coordinating discharge planning if the patient needs post-hospital services based on physician/advanced practitioner order or complex needs related to functional status, cognitive ability, or social support system  Outcome: Progressing     Problem: Knowledge Deficit  Goal: Patient/family/caregiver demonstrates understanding of disease process, treatment plan, medications, and discharge instructions  Description: Complete learning assessment and assess knowledge base.  Interventions:  - Provide teaching at level of understanding  - Provide teaching via preferred learning methods  Outcome: Progressing     Problem: Prexisting or High Potential for Compromised Skin Integrity  Goal: Skin integrity is maintained or improved  Description: INTERVENTIONS:  - Identify patients at risk for skin breakdown  - Assess and monitor skin integrity  - Assess and monitor nutrition and hydration status  - Monitor labs   - Assess for incontinence   - Turn and reposition patient  - Assist with mobility/ambulation  - Relieve pressure over bony prominences  - Avoid friction and shearing  - Provide appropriate hygiene as needed including keeping skin clean and dry  - Evaluate need for skin moisturizer/barrier cream  - Collaborate with interdisciplinary team   - Patient/family teaching  - Consider wound care consult   Outcome: Progressing

## 2024-10-05 NOTE — ASSESSMENT & PLAN NOTE
Hemoglobin 8.4 on admission, baseline appears around 11-13 with most recent hemoglobin 13.0 in April  She does report stools have been black recently  Hold home aspirin  B12 and folate WNL, iron panel consistent with [place]  Continue IV protonix  Hemoglobin dropped to 6.9 this morning; transfuse one unit PRBC today   GI consulted -EGD demonstrated erosive esophagitis as evidenced by continued drop in hemoglobin below baseline treated with IV Protonix drip, blood transfusion and CBC monitoring.  Patient noted to be severely iron deficient, started Venofer  Will get CT scan of left upper extremity, negative for any hematoma or bleeding   chest abdomen pelvis to ensure no retroperitoneal bleed or occult bleeding status post fall, pending  Repeat cbc in am

## 2024-10-05 NOTE — PROGRESS NOTES
Progress Note - Hospitalist   Name: Pattie Manning 89 y.o. female I MRN: [id number]  Unit/Bed#: 2 [address] Date of Admission: 10/2/2024   Date of Service: 10/5/2024 I Hospital Day: 3    Assessment & Plan  Left humeral fracture  Presented to ED with left arm pain after fall at home  XR L humerus shows fracture of left humeral proximal metaphysis  Continue supportive care with LUE sling  CT upper extremity without contrast on left performed on 10/4 demonstrated comminuted impacted left humeral neck fracture as per radiology report, no evidence of hematoma or bleeding  Scheduled tylenol  PT/OT  Fall precautions  Acute blood loss anemia  Hemoglobin 8.4 on admission, baseline appears around 11-13 with most recent hemoglobin 13.0 in April  She does report stools have been black recently  Hold home aspirin  B12 and folate WNL, iron panel consistent with JAXON  Continue IV protonix  Hemoglobin dropped to 6.9 this morning; transfuse one unit PRBC today   GI consulted -EGD demonstrated erosive esophagitis as evidenced by continued drop in hemoglobin below baseline treated with IV Protonix drip, blood transfusion and CBC monitoring.  Patient noted to be severely iron deficient, started Venofer  Will get CT scan of left upper extremity, negative for any hematoma or bleeding   chest abdomen pelvis to ensure no retroperitoneal bleed or occult bleeding status post fall, pending  Repeat cbc in am  Syncope  While working with PT in the ED, patient said she needed to sit down and reportedly became unresponsive and was assisted to chair  Rapid response called 10/2 after patient had another syncopal episode while ambulating to the bathroom  EKG shows NSR with PACs  Troponin negative  ECHO shows EF 65%, systolic function normal, diastolic function normal for age, no major valvular abnormalities  Check orthostatic vitals when able  Monitor on telemetry, no events; discontinue  Likely in setting of acute anemia/GI  bleed  Leukocytosis  Possibly reactive in setting of fall and acute fracture  Patient is afebrile and hemodynamically stable  Procalcitonin negative  CXR shows pneumonia vs atelectasis  Given improvement of WBC without antibiotics, doubt acute infection  UA shows innumerable bacteria, leukocytes however patient is asymptomatic of urinary symptoms  Monitor off of antibiotics  Monitor cbc  Primary hypertension  Continue lisinopril  Dyslipidemia  Continue lipitor  Age-related osteoporosis without current pathological fracture  Continue calcium and vitamin D supplementation  Esophagitis  Patient underwent EGD with GI was found to have erosive esophagitis treated with clear liquid diet advance to full liquid, Protonix drip.  Continue to monitor CBC, patient did receive 1 transfusion PRBC on 10/4 with improvement to hemoglobin of 9.2      VTE Pharmacologic Prophylaxis: VTE Score: 3 Moderate Risk (Score 3-4) - Pharmacological DVT Prophylaxis Contraindicated. Sequential Compression Devices Ordered.    Mobility:   Basic Mobility Inpatient Raw Score: 17  JH-HLM Goal: 5: Stand one or more mins  JH-HLM Achieved: 7: Walk 25 feet or more  JH-HLM Goal achieved. Continue to encourage appropriate mobility.    Patient Centered Rounds: I performed bedside rounds with nursing staff today.   Discussions with Specialists or Other Care Team Provider: multidisciplinary team     Education and Discussions with Family / Patient: Updated  (friend) via phone.    Current Length of Stay: 3 day(s)  Current Patient Status: Inpatient   Certification Statement: The patient will continue to require additional inpatient hospital stay due to Venofer infusion, Protonix drip, advance diet as tolerated, repeat CBC  Discharge Plan: Anticipate discharge in 48 hrs to discharge location to be determined pending rehab evaluations.    Code Status: Level 1 - Full Code    Subjective   Patient seen laying in bed resting comfortably.  Reports that she  slept okay last night.  Does admit to having some mild discomfort in her left upper arm.  Reports that she is hungry this morning and is looking forward to having breakfast.  No nausea or vomiting.  No fevers or chills.    Objective :  Temp:  [97.8 °F (36.6 °C)-98.5 °F (36.9 °C)] 98.3 °F (36.8 °C)  HR:  [] 71  BP: (129-146)/(64-77) 135/68  Resp:  [17-20] 18  SpO2:  [92 %-96 %] 93 %  O2 Device: None (Room air)    Body mass index is 22.66 kg/m².     Input and Output Summary (last 24 hours):     Intake/Output Summary (Last 24 hours) at 10/5/2024 1036  Last data filed at 10/4/2024 2113  Gross per 24 hour   Intake 350 ml   Output --   Net 350 ml       Physical Exam  Vitals and nursing note reviewed.   Constitutional:       General: She is not in acute distress.     Appearance: Normal appearance.   HENT:      Head: Normocephalic.      Nose: Nose normal.      Mouth/Throat:      Mouth: Mucous membranes are moist.   Eyes:      Extraocular Movements: Extraocular movements intact.      Conjunctiva/sclera: Conjunctivae normal.      Pupils: Pupils are equal, round, and reactive to light.   Cardiovascular:      Rate and Rhythm: Normal rate and regular rhythm.      Pulses: Normal pulses.   Pulmonary:      Effort: Pulmonary effort is normal.      Breath sounds: Normal breath sounds.   Abdominal:      General: Bowel sounds are normal. There is no distension.      Palpations: Abdomen is soft.      Tenderness: There is no abdominal tenderness.   Genitourinary:     Comments: Voiding spontaneously   Musculoskeletal:      Cervical back: Normal range of motion.      Right lower leg: No edema.      Left lower leg: No edema.      Comments: LUE in sling; limited ROM   Skin:     General: Skin is warm and dry.      Capillary Refill: Capillary refill takes less than 2 seconds.      Coloration: Skin is pale.   Neurological:      General: No focal deficit present.      Mental Status: She is alert and oriented to person, place, and time.    Psychiatric:         Mood and Affect: Mood normal.         Behavior: Behavior normal.         Thought Content: Thought content normal.         Judgment: Judgment normal.           Lines/Drains:              Lab Results: I have reviewed the following results:   Results from last 7 days   Lab Units 10/05/24  0537 10/03/24  0548 10/02/24  1507   WBC Thousand/uL 8.56   < > 20.03*   HEMOGLOBIN g/dL 9.2*   < > 8.4*   HEMATOCRIT % 27.7*   < > 26.6*   PLATELETS Thousands/uL 496*   < > 522*   SEGS PCT %  --   --  89*   LYMPHO PCT %  --   --  5*   MONO PCT %  --   --  5   EOS PCT %  --   --  0    < > = values in this interval not displayed.     Results from last 7 days   Lab Units 10/05/24  0537 10/03/24  0548 10/02/24  1507   SODIUM mmol/L 135   < > 132*   POTASSIUM mmol/L 3.7   < > 3.7   CHLORIDE mmol/L 105   < > 102   CO2 mmol/L 22   < > 21   BUN mg/dL 11   < > 15   CREATININE mg/dL 0.69   < > 0.77   ANION GAP mmol/L 8   < > 9   CALCIUM mg/dL 8.1*   < > 9.1   ALBUMIN g/dL  --   --  3.7   TOTAL BILIRUBIN mg/dL  --   --  0.32   ALK PHOS U/L  --   --  56   ALT U/L  --   --  12   AST U/L  --   --  18   GLUCOSE RANDOM mg/dL 94   < > 128    < > = values in this interval not displayed.     Results from last 7 days   Lab Units 10/02/24  1507   INR  1.04     Results from last 7 days   Lab Units 10/02/24  2110 10/02/24  1505   POC GLUCOSE mg/dl 133 127         Results from last 7 days   Lab Units 10/03/24  0548   PROCALCITONIN ng/ml 0.07       Recent Cultures (last 7 days):         Imaging Results Review: I reviewed radiology reports from this admission including: CT LUE.  Other Study Results Review: No additional pertinent studies reviewed.    Last 24 Hours Medication List:     Current Facility-Administered Medications:     acetaminophen (TYLENOL) tablet 975 mg, Q8H MAMIE    atorvastatin (LIPITOR) tablet 20 mg, HS    bimatoprost (LUMIGAN) 0.03 % ophthalmic drops 1 drop, Daily    Cholecalciferol (VITAMIN D3) tablet 400 Units,  Daily    fluticasone (FLONASE) 50 mcg/act nasal spray 1 spray, BID    iron sucrose (VENOFER) 200 mg in sodium chloride 0.9 % 100 mL IVPB, Daily, Last Rate: 200 mg (10/04/24 0853)    lisinopril (ZESTRIL) tablet 20 mg, Daily    multivitamin stress formula tablet 1 tablet, Daily    pantoprazole (PROTONIX) 80 mg in sodium chloride 0.9 % 100 mL infusion, Continuous, Last Rate: 8 mg/hr (10/05/24 0638)    Administrative Statements   Today, Patient Was Seen By: MARGE Estrada  I have spent a total time of greater than 45 minutes in caring for this patient on the day of the visit/encounter including Diagnostic results, Counseling / Coordination of care, Documenting in the medical record, Reviewing / ordering tests, medicine, procedures  , and Communicating with other healthcare professionals .    **Please Note: This note may have been constructed using a voice recognition system.**

## 2024-10-05 NOTE — ASSESSMENT & PLAN NOTE
Possibly reactive in setting of fall and acute fracture  Patient is afebrile and hemodynamically stable  Procalcitonin negative  CXR shows pneumonia vs atelectasis  Given improvement of WBC without antibiotics, doubt acute infection  UA shows innumerable bacteria, leukocytes however patient is asymptomatic of urinary symptoms  Monitor off of antibiotics  Monitor cbc

## 2024-10-05 NOTE — PHYSICAL THERAPY NOTE
" PT TREATMENT     10/05/24 0956   PT Last Visit   PT Visit Date 10/05/24   Note Type   Note Type Treatment   Pain Assessment   Pain Assessment Tool 0-10   Pain Score No Pain   Restrictions/Precautions   LUE Weight Bearing Per Order NWB   Braces or Orthoses Sling   Other Precautions Pain;Fall Risk;Bed Alarm;Chair Alarm;Hard of hearing   General   Chart Reviewed Yes   Cognition   Overall Cognitive Status WFL   Arousal/Participation Alert   Attention Within functional limits   Orientation Level Oriented X4   Following Commands Follows one step commands without difficulty   Subjective   Subjective \"My L knee is stiff from laying in bed\"   Bed Mobility   Supine to Sit 5  Supervision   Additional items Bedrails;HOB elevated;Increased time required   Transfers   Sit to Stand 4  Minimal assistance   Stand to Sit 4  Minimal assistance   Stand pivot 4  Minimal assistance   Toilet transfer 4  Minimal assistance; grab bar   Additional items   (dependent to wipe)   Ambulation/Elevation   Gait pattern   (antalgic L)   Gait Assistance 4  Minimal assist   Assistive Device   (hand held or IV pole)   Distance 2x20 feet, 75 feet   Balance   Static Sitting Fair +   Static Standing Fair -   Ambulatory Fair -   Exercises    x 10 each ankle pumps, LAQ, hip flexion seated   Assessment   Prognosis Good   Problem List Decreased strength;Decreased range of motion;Impaired balance;Decreased mobility;Pain;Orthopedic restrictions   Assessment Pt demonstrates improving activity tolerance s/p fall with L proximal humerus fx.  Pt does require min assist for all mobility and also c/o L knee pain/stiffness likely due to bedrest/global OA.  Pt is highly motivated as she was previously very active and independent 89 year old and should continue to benefit from skilled PT with level II recommended.  Plan to trial gait with a cane next session.    The patient's AM-PAC Basic Mobility Inpatient Short Form Raw Score is 17. A Raw score of greater than 16 " suggests the patient may benefit from discharge to home. Please also refer to the recommendation of the Physical Therapist for safe discharge planning.         Plan   Treatment/Interventions Functional transfer training;ADL retraining;Elevations;LE strengthening/ROM;Therapeutic exercise;Gait training;Bed mobility;Equipment eval/education;Patient/family training   Progress Progressing toward goals   PT Frequency 5-7x/wk   Discharge Recommendation   Rehab Resource Intensity Level, PT II (Moderate Resource Intensity)   AM-PAC Basic Mobility Inpatient   Turning in Flat Bed Without Bedrails 3   Lying on Back to Sitting on Edge of Flat Bed Without Bedrails 3   Moving Bed to Chair 3   Standing Up From Chair Using Arms 3   Walk in Room 3   Climb 3-5 Stairs With Railing 2   Basic Mobility Inpatient Raw Score 17   Basic Mobility Standardized Score 39.67   [facility] Highest Level Of Mobility   -HLM Goal 5: Stand one or more mins   -HLM Achieved 7: Walk 25 feet or more   Education   Education Provided   (NWB L UE at all times)   End of Consult   Patient Position at End of Consult All needs within reach;Bed/Chair alarm activated;Bedside chair   Licensure   [place] License Number  [id number] [id number]

## 2024-10-05 NOTE — ASSESSMENT & PLAN NOTE
Patient underwent EGD with GI was found to have erosive esophagitis treated with clear liquid diet advance to full liquid, Protonix drip.  Continue to monitor CBC, patient did receive 1 transfusion PRBC on 10/4 with improvement to hemoglobin of 9.2

## 2024-10-05 NOTE — ASSESSMENT & PLAN NOTE
While working with PT in the ED, patient said she needed to sit down and reportedly became unresponsive and was assisted to chair  Rapid response called 10/2 after patient had another syncopal episode while ambulating to the bathroom  EKG shows NSR with PACs  Troponin negative  ECHO shows EF 65%, systolic function normal, diastolic function normal for age, no major valvular abnormalities  Check orthostatic vitals when able  Monitor on telemetry, no events; discontinue  Likely in setting of acute anemia/GI bleed

## 2024-10-05 NOTE — ASSESSMENT & PLAN NOTE
Presented to ED with left arm pain after fall at home  XR L humerus shows fracture of left humeral proximal metaphysis  Continue supportive care with LUE sling  CT upper extremity without contrast on left performed on 10/4 demonstrated comminuted impacted left humeral neck fracture as per radiology report, no evidence of hematoma or bleeding  Scheduled tylenol  PT/OT  Fall precautions

## 2024-10-06 LAB
ANION GAP SERPL CALCULATED.3IONS-SCNC: 7 MMOL/L (ref 4–13)
BACTERIA UR QL AUTO: ABNORMAL /HPF
BILIRUB UR QL STRIP: NEGATIVE
BUN SERPL-MCNC: 10 MG/DL (ref 5–25)
CALCIUM SERPL-MCNC: 8 MG/DL (ref 8.4–10.2)
CHLORIDE SERPL-SCNC: 106 MMOL/L (ref 96–108)
CLARITY UR: CLEAR
CO2 SERPL-SCNC: 22 MMOL/L (ref 21–32)
COLOR UR: YELLOW
CREAT SERPL-MCNC: 0.68 MG/DL (ref 0.6–1.3)
ERYTHROCYTE [DISTWIDTH] IN BLOOD BY AUTOMATED COUNT: 16.7 % (ref 11.6–15.1)
GFR SERPL CREATININE-BSD FRML MDRD: 78 ML/MIN/1.73SQ M
GLUCOSE SERPL-MCNC: 92 MG/DL (ref 65–140)
GLUCOSE UR STRIP-MCNC: NEGATIVE MG/DL
HCT VFR BLD AUTO: 27.8 % (ref 34.8–46.1)
HGB BLD-MCNC: 9.1 G/DL (ref 11.5–15.4)
HGB UR QL STRIP.AUTO: ABNORMAL
KETONES UR STRIP-MCNC: NEGATIVE MG/DL
LEUKOCYTE ESTERASE UR QL STRIP: ABNORMAL
MCH RBC QN AUTO: 30.6 PG (ref 26.8–34.3)
MCHC RBC AUTO-ENTMCNC: 32.7 G/DL (ref 31.4–37.4)
MCV RBC AUTO: 94 FL (ref 82–98)
NITRITE UR QL STRIP: NEGATIVE
NON-SQ EPI CELLS URNS QL MICRO: ABNORMAL /HPF
PH UR STRIP.AUTO: 5.5 [PH]
PLATELET # BLD AUTO: 482 THOUSANDS/UL (ref 149–390)
PMV BLD AUTO: 7.9 FL (ref 8.9–12.7)
POTASSIUM SERPL-SCNC: 3.5 MMOL/L (ref 3.5–5.3)
PROT UR STRIP-MCNC: ABNORMAL MG/DL
RBC # BLD AUTO: 2.97 MILLION/UL (ref 3.81–5.12)
RBC #/AREA URNS AUTO: ABNORMAL /HPF
SODIUM SERPL-SCNC: 135 MMOL/L (ref 135–147)
SP GR UR STRIP.AUTO: 1.02 (ref 1–1.03)
UROBILINOGEN UR STRIP-ACNC: <2 MG/DL
WBC # BLD AUTO: 8.3 THOUSAND/UL (ref 4.31–10.16)
WBC #/AREA URNS AUTO: ABNORMAL /HPF

## 2024-10-06 PROCEDURE — 99232 SBSQ HOSP IP/OBS MODERATE 35: CPT | Performed by: NURSE PRACTITIONER

## 2024-10-06 PROCEDURE — 81001 URINALYSIS AUTO W/SCOPE: CPT | Performed by: NURSE PRACTITIONER

## 2024-10-06 PROCEDURE — 85027 COMPLETE CBC AUTOMATED: CPT | Performed by: NURSE PRACTITIONER

## 2024-10-06 PROCEDURE — 80048 BASIC METABOLIC PNL TOTAL CA: CPT | Performed by: NURSE PRACTITIONER

## 2024-10-06 RX ORDER — FERROUS SULFATE 325(65) MG
325 TABLET ORAL
Status: DISCONTINUED | OUTPATIENT
Start: 2024-10-07 | End: 2024-10-07 | Stop reason: HOSPADM

## 2024-10-06 RX ADMIN — FLUTICASONE PROPIONATE 1 SPRAY: 50 SPRAY, METERED NASAL at 21:40

## 2024-10-06 RX ADMIN — PANTOPRAZOLE SODIUM 8 MG/HR: 40 INJECTION, POWDER, FOR SOLUTION INTRAVENOUS at 07:21

## 2024-10-06 RX ADMIN — ACETAMINOPHEN 975 MG: 325 TABLET ORAL at 21:40

## 2024-10-06 RX ADMIN — LISINOPRIL 20 MG: 20 TABLET ORAL at 09:13

## 2024-10-06 RX ADMIN — ACETAMINOPHEN 975 MG: 325 TABLET ORAL at 05:34

## 2024-10-06 RX ADMIN — ACETAMINOPHEN 975 MG: 325 TABLET ORAL at 13:43

## 2024-10-06 RX ADMIN — IRON SUCROSE 200 MG: 20 INJECTION, SOLUTION INTRAVENOUS at 10:04

## 2024-10-06 RX ADMIN — CHOLECALCIFEROL (VITAMIN D3) 10 MCG (400 UNIT) TABLET 400 UNITS: at 09:13

## 2024-10-06 RX ADMIN — BIMATOPROST 1 DROP: 0.3 SOLUTION/ DROPS OPHTHALMIC at 09:14

## 2024-10-06 RX ADMIN — ATORVASTATIN CALCIUM 20 MG: 20 TABLET, FILM COATED ORAL at 21:40

## 2024-10-06 RX ADMIN — PANTOPRAZOLE SODIUM 8 MG/HR: 40 INJECTION, POWDER, FOR SOLUTION INTRAVENOUS at 17:52

## 2024-10-06 RX ADMIN — FLUTICASONE PROPIONATE 1 SPRAY: 50 SPRAY, METERED NASAL at 09:14

## 2024-10-06 RX ADMIN — B-COMPLEX W/ C & FOLIC ACID TAB 1 TABLET: TAB at 09:13

## 2024-10-06 NOTE — ASSESSMENT & PLAN NOTE
Presented to ED with left arm pain after fall at home  XR L humerus shows fracture of left humeral proximal metaphysis  Continue supportive care with LUE sling  CT upper extremity without contrast on left performed on 10/4 demonstrated comminuted impacted left humeral neck fracture as per radiology report, no evidence of hematoma or bleeding  Scheduled tylenol  PT/OT recommending short-term rehab, discussed with patient and she is considering  Fall precautions

## 2024-10-06 NOTE — ASSESSMENT & PLAN NOTE
Possibly reactive in setting of fall and acute fracture  Patient is afebrile and hemodynamically stable  Procalcitonin negative  CXR shows pneumonia vs atelectasis  Given improvement of WBC without antibiotics, doubt acute infection  UA shows innumerable bacteria, leukocytes; 10/6 patient complained of suprapubic discomfort while urinating once; will get UA   Follow up results; low threshold to start abx if UA is positive and symptoms persist   Monitor cbc

## 2024-10-06 NOTE — ASSESSMENT & PLAN NOTE
Patient underwent EGD with GI was found to have erosive esophagitis treated with clear liquid diet advance to full liquid, Protonix drip.  Will discuss with GI advancement of diet as patient seems to be tolerating  Continue to monitor CBC, patient did receive 1 transfusion PRBC on 10/4 with improvement to hemoglobin of 9.1

## 2024-10-06 NOTE — ASSESSMENT & PLAN NOTE
While working with PT in the ED, patient said she needed to sit down and reportedly became unresponsive and was assisted to chair  Rapid response called 10/2 after patient had another syncopal episode while ambulating to the bathroom  EKG shows NSR with PACs  Troponin negative  ECHO shows EF 65%, systolic function normal, diastolic function normal for age, no major valvular abnormalities  Check orthostatic vitals when able  Likely in setting of acute anemia/GI bleed

## 2024-10-06 NOTE — PROGRESS NOTES
Progress Note - Hospitalist   Name: Pattie Manning 89 y.o. female I MRN: [id number]  Unit/Bed#: 2 [address] Date of Admission: 10/2/2024   Date of Service: 10/6/2024 I Hospital Day: 4    Assessment & Plan  Left humeral fracture  Presented to ED with left arm pain after fall at home  XR L humerus shows fracture of left humeral proximal metaphysis  Continue supportive care with LUE sling  CT upper extremity without contrast on left performed on 10/4 demonstrated comminuted impacted left humeral neck fracture as per radiology report, no evidence of hematoma or bleeding  Scheduled tylenol  PT/OT recommending short-term rehab, discussed with patient and she is considering  Fall precautions  Acute blood loss anemia  Hemoglobin 8.4 on admission, baseline appears around 11-13 with most recent hemoglobin 13.0 in April  She does report stools have been black recently  Hold home aspirin  B12 and folate WNL, iron panel consistent with JAXON  Continue IV protonix  Hemoglobin dropped to 6.9 10/4; transfused one unit PRBC  GI consulted -EGD demonstrated erosive esophagitis as evidenced by continued drop in hemoglobin below baseline treated with IV Protonix drip, blood transfusion and CBC monitoring.  Patient noted to be severely iron deficient, started Venofer, 10/6-day 3/3, will start oral iron supplementation in a.m.  Will get CT scan of left upper extremity, negative for any hematoma or bleeding   chest abdomen pelvis to ensure no retroperitoneal bleed or occult bleeding status post fall, negative  Repeat cbc in am  Hemoglobin 9.1  Syncope  While working with PT in the ED, patient said she needed to sit down and reportedly became unresponsive and was assisted to chair  Rapid response called 10/2 after patient had another syncopal episode while ambulating to the bathroom  EKG shows NSR with PACs  Troponin negative  ECHO shows EF 65%, systolic function normal, diastolic function normal for age, no major valvular  abnormalities  Check orthostatic vitals when able  Likely in setting of acute anemia/GI bleed  Leukocytosis  Possibly reactive in setting of fall and acute fracture  Patient is afebrile and hemodynamically stable  Procalcitonin negative  CXR shows pneumonia vs atelectasis  Given improvement of WBC without antibiotics, doubt acute infection  UA shows innumerable bacteria, leukocytes; 10/6 patient complained of suprapubic discomfort while urinating once; will get UA   Follow up results; low threshold to start abx if UA is positive and symptoms persist   Monitor cbc  Primary hypertension  Continue lisinopril  Dyslipidemia  Continue lipitor  Age-related osteoporosis without current pathological fracture  Continue calcium and vitamin D supplementation  Esophagitis  Patient underwent EGD with GI was found to have erosive esophagitis treated with clear liquid diet advance to full liquid, Protonix drip.  Will discuss with GI advancement of diet as patient seems to be tolerating  Continue to monitor CBC, patient did receive 1 transfusion PRBC on 10/4 with improvement to hemoglobin of 9.1      VTE Pharmacologic Prophylaxis: VTE Score: 3 Moderate Risk (Score 3-4) - Pharmacological DVT Prophylaxis Contraindicated. Sequential Compression Devices Ordered.    Mobility:   Basic Mobility Inpatient Raw Score: 15  JH-HLM Goal: 4: Move to chair/commode  JH-HLM Achieved: 4: Move to chair/commode  JH-HLM Goal achieved. Continue to encourage appropriate mobility.    Patient Centered Rounds: I performed bedside rounds with nursing staff today.   Discussions with Specialists or Other Care Team Provider: Multidisciplinary team     Education and Discussions with Family / Patient: Updated  (friend) via phone.    Current Length of Stay: 4 day(s)  Current Patient Status: Inpatient   Certification Statement: The patient will continue to require additional inpatient hospital stay due to advancing diet, repeat labs, PT and OT    Discharge Plan: Anticipate discharge in 24-48 hrs to rehab facility.    Code Status: Level 1 - Full Code    Subjective   Patient seen sitting up in bed resting comfortably.  Eating breakfast.  Reports that she slept very well last night.  Did report that she had some discomfort when urinating this morning, described it as a sudden sharp pain in her suprapubic region.  Denies any fevers or chills.  Reports intermittent mild pain in the left upper extremity.  No headache or dizziness, no shortness of breath.    Objective :  Temp:  [97.8 °F (36.6 °C)-98 °F (36.7 °C)] 97.9 °F (36.6 °C)  HR:  [72-85] 72  BP: (129-134)/(70-74) 133/70  Resp:  [14-18] 18  SpO2:  [93 %-94 %] 94 %    Body mass index is 22.66 kg/m².     Input and Output Summary (last 24 hours):     Intake/Output Summary (Last 24 hours) at 10/6/2024 1047  Last data filed at 10/6/2024 0730  Gross per 24 hour   Intake --   Output 1 ml   Net -1 ml       Physical Exam  Vitals and nursing note reviewed.   Constitutional:       Appearance: Normal appearance.   HENT:      Head: Normocephalic.      Nose: Nose normal.      Mouth/Throat:      Mouth: Mucous membranes are moist.   Eyes:      Extraocular Movements: Extraocular movements intact.      Conjunctiva/sclera: Conjunctivae normal.      Pupils: Pupils are equal, round, and reactive to light.   Cardiovascular:      Rate and Rhythm: Normal rate and regular rhythm.      Pulses: Normal pulses.   Pulmonary:      Effort: Pulmonary effort is normal.      Breath sounds: Normal breath sounds.   Abdominal:      General: Bowel sounds are normal. There is no distension.      Palpations: Abdomen is soft.      Tenderness: There is no abdominal tenderness.   Genitourinary:     Comments: Patient complained of some suprapubic discomfort when urinating this am; described it as a brief sharp pain.   Musculoskeletal:      Cervical back: Normal range of motion.      Comments: LUE in sling    Skin:     General: Skin is warm and dry.       Capillary Refill: Capillary refill takes less than 2 seconds.      Coloration: Skin is pale.   Neurological:      General: No focal deficit present.      Mental Status: She is alert and oriented to person, place, and time.   Psychiatric:         Mood and Affect: Mood normal.         Behavior: Behavior normal.         Thought Content: Thought content normal.         Judgment: Judgment normal.           Lines/Drains:              Lab Results: I have reviewed the following results:   Results from last 7 days   Lab Units 10/06/24  0531 10/03/24  0548 10/02/24  1507   WBC Thousand/uL 8.30   < > 20.03*   HEMOGLOBIN g/dL 9.1*   < > 8.4*   HEMATOCRIT % 27.8*   < > 26.6*   PLATELETS Thousands/uL 482*   < > 522*   SEGS PCT %  --   --  89*   LYMPHO PCT %  --   --  5*   MONO PCT %  --   --  5   EOS PCT %  --   --  0    < > = values in this interval not displayed.     Results from last 7 days   Lab Units 10/06/24  0531 10/03/24  0548 10/02/24  1507   SODIUM mmol/L 135   < > 132*   POTASSIUM mmol/L 3.5   < > 3.7   CHLORIDE mmol/L 106   < > 102   CO2 mmol/L 22   < > 21   BUN mg/dL 10   < > 15   CREATININE mg/dL 0.68   < > 0.77   ANION GAP mmol/L 7   < > 9   CALCIUM mg/dL 8.0*   < > 9.1   ALBUMIN g/dL  --   --  3.7   TOTAL BILIRUBIN mg/dL  --   --  0.32   ALK PHOS U/L  --   --  56   ALT U/L  --   --  12   AST U/L  --   --  18   GLUCOSE RANDOM mg/dL 92   < > 128    < > = values in this interval not displayed.     Results from last 7 days   Lab Units 10/02/24  1507   INR  1.04     Results from last 7 days   Lab Units 10/02/24  2110 10/02/24  1505   POC GLUCOSE mg/dl 133 127         Results from last 7 days   Lab Units 10/03/24  0548   PROCALCITONIN ng/ml 0.07       Recent Cultures (last 7 days):         Imaging Results Review: I reviewed radiology reports from this admission including:  , CT chest, and CT abdomen/pelvis.  Other Study Results Review: No additional pertinent studies reviewed.    Last 24 Hours Medication List:      Current Facility-Administered Medications:     acetaminophen (TYLENOL) tablet 975 mg, Q8H MAMIE    atorvastatin (LIPITOR) tablet 20 mg, HS    bimatoprost (LUMIGAN) 0.03 % ophthalmic drops 1 drop, Daily    Cholecalciferol (VITAMIN D3) tablet 400 Units, Daily    [START ON 10/7/2024] ferrous sulfate tablet 325 mg, Daily With Breakfast    fluticasone (FLONASE) 50 mcg/act nasal spray 1 spray, BID    iron sucrose (VENOFER) 200 mg in sodium chloride 0.9 % 100 mL IVPB, Daily, Last Rate: 200 mg (10/06/24 1004)    lisinopril (ZESTRIL) tablet 20 mg, Daily    multivitamin stress formula tablet 1 tablet, Daily    pantoprazole (PROTONIX) 80 mg in sodium chloride 0.9 % 100 mL infusion, Continuous, Last Rate: 8 mg/hr (10/06/24 0721)    Administrative Statements   Today, Patient Was Seen By: MARGE Estrada  I have spent a total time of greater than 45 minutes in caring for this patient on the day of the visit/encounter including Diagnostic results, Counseling / Coordination of care, Documenting in the medical record, Reviewing / ordering tests, medicine, procedures  , and Communicating with other healthcare professionals .    **Please Note: This note may have been constructed using a voice recognition system.**

## 2024-10-06 NOTE — ASSESSMENT & PLAN NOTE
Hemoglobin 8.4 on admission, baseline appears around 11-13 with most recent hemoglobin 13.0 in April  She does report stools have been black recently  Hold home aspirin  B12 and folate WNL, iron panel consistent with JAXON  Continue IV protonix  Hemoglobin dropped to 6.9 10/4; transfused one unit PRBC  GI consulted -EGD demonstrated erosive esophagitis as evidenced by continued drop in hemoglobin below baseline treated with IV Protonix drip, blood transfusion and CBC monitoring.  Patient noted to be severely iron deficient, started Venofer, 10/6-day 3/3, will start oral iron supplementation in a.m.  Will get CT scan of left upper extremity, negative for any hematoma or bleeding   chest abdomen pelvis to ensure no retroperitoneal bleed or occult bleeding status post fall, negative  Repeat cbc in am  Hemoglobin 9.1

## 2024-10-06 NOTE — PLAN OF CARE
Problem: PAIN - ADULT  Goal: Verbalizes/displays adequate comfort level or baseline comfort level  Description: Interventions:  - Encourage patient to monitor pain and request assistance  - Assess pain using appropriate pain scale  - Administer analgesics based on type and severity of pain and evaluate response  - Implement non-pharmacological measures as appropriate and evaluate response  - Consider cultural and social influences on pain and pain management  - Notify physician/advanced practitioner if interventions unsuccessful or patient reports new pain  Outcome: Progressing     Problem: INFECTION - ADULT  Goal: Absence or prevention of progression during hospitalization  Description: INTERVENTIONS:  - Assess and monitor for signs and symptoms of infection  - Monitor lab/diagnostic results  - Monitor all insertion sites, i.e. indwelling lines, tubes, and drains  - Monitor endotracheal if appropriate and nasal secretions for changes in amount and color  - [place] appropriate cooling/warming therapies per order  - Administer medications as ordered  - Instruct and encourage patient and family to use good hand hygiene technique  - Identify and instruct in appropriate isolation precautions for identified infection/condition  Outcome: Progressing     Problem: Knowledge Deficit  Goal: Patient/family/caregiver demonstrates understanding of disease process, treatment plan, medications, and discharge instructions  Description: Complete learning assessment and assess knowledge base.  Interventions:  - Provide teaching at level of understanding  - Provide teaching via preferred learning methods  Outcome: Progressing

## 2024-10-06 NOTE — PLAN OF CARE
Problem: PAIN - ADULT  Goal: Verbalizes/displays adequate comfort level or baseline comfort level  Description: Interventions:  - Encourage patient to monitor pain and request assistance  - Assess pain using appropriate pain scale  - Administer analgesics based on type and severity of pain and evaluate response  - Implement non-pharmacological measures as appropriate and evaluate response  - Consider cultural and social influences on pain and pain management  - Notify physician/advanced practitioner if interventions unsuccessful or patient reports new pain  Outcome: Progressing     Problem: INFECTION - ADULT  Goal: Absence or prevention of progression during hospitalization  Description: INTERVENTIONS:  - Assess and monitor for signs and symptoms of infection  - Monitor lab/diagnostic results  - Monitor all insertion sites, i.e. indwelling lines, tubes, and drains  - Monitor endotracheal if appropriate and nasal secretions for changes in amount and color  - [place] appropriate cooling/warming therapies per order  - Administer medications as ordered  - Instruct and encourage patient and family to use good hand hygiene technique  - Identify and instruct in appropriate isolation precautions for identified infection/condition  Outcome: Progressing  Goal: Absence of fever/infection during neutropenic period  Description: INTERVENTIONS:  - Monitor WBC    Outcome: Progressing     Problem: SAFETY ADULT  Goal: Patient will remain free of falls  Description: INTERVENTIONS:  - Educate patient/family on patient safety including physical limitations  - Instruct patient to call for assistance with activity   - Consult OT/PT to assist with strengthening/mobility   - Keep Call bell within reach  - Keep bed low and locked with side rails adjusted as appropriate  - Keep care items and personal belongings within reach  - Initiate and maintain comfort rounds  - Make Fall Risk Sign visible to staff  - Offer Toileting every  Hours,  in advance of need  - Initiate/Maintain alarm  - Obtain necessary fall risk management equipment:   - Apply yellow socks and bracelet for high fall risk patients  - Consider moving patient to room near nurses station  Outcome: Progressing  Goal: Maintain or return to baseline ADL function  Description: INTERVENTIONS:  -  Assess patient's ability to carry out ADLs; assess patient's baseline for ADL function and identify physical deficits which impact ability to perform ADLs (bathing, care of mouth/teeth, toileting, grooming, dressing, etc.)  - Assess/evaluate cause of self-care deficits   - Assess range of motion  - Assess patient's mobility; develop plan if impaired  - Assess patient's need for assistive devices and provide as appropriate  - Encourage maximum independence but intervene and supervise when necessary  - Involve family in performance of ADLs  - Assess for home care needs following discharge   - Consider OT consult to assist with ADL evaluation and planning for discharge  - Provide patient education as appropriate  Outcome: Progressing  Goal: Maintains/Returns to pre admission functional level  Description: INTERVENTIONS:  - Perform AM-PAC 6 Click Basic Mobility/ Daily Activity assessment daily.  - Set and communicate daily mobility goal to care team and patient/family/caregiver.   - Collaborate with rehabilitation services on mobility goals if consulted  - Perform Range of Motion  times a day.  - Reposition patient every  hours.  - Dangle patient  times a day  - Stand patient  times a day  - Ambulate patient  times a day  - Out of bed to chair  times a day   - Out of bed for meals  times a day  - Out of bed for toileting  - Record patient progress and toleration of activity level   Outcome: Progressing     Problem: DISCHARGE PLANNING  Goal: Discharge to home or other facility with appropriate resources  Description: INTERVENTIONS:  - Identify barriers to discharge w/patient and caregiver  - Arrange for  needed discharge resources and transportation as appropriate  - Identify discharge learning needs (meds, wound care, etc.)  - Arrange for interpretive services to assist at discharge as needed  - Refer to Case Management Department for coordinating discharge planning if the patient needs post-hospital services based on physician/advanced practitioner order or complex needs related to functional status, cognitive ability, or social support system  Outcome: Progressing     Problem: Knowledge Deficit  Goal: Patient/family/caregiver demonstrates understanding of disease process, treatment plan, medications, and discharge instructions  Description: Complete learning assessment and assess knowledge base.  Interventions:  - Provide teaching at level of understanding  - Provide teaching via preferred learning methods  Outcome: Progressing     Problem: Prexisting or High Potential for Compromised Skin Integrity  Goal: Skin integrity is maintained or improved  Description: INTERVENTIONS:  - Identify patients at risk for skin breakdown  - Assess and monitor skin integrity  - Assess and monitor nutrition and hydration status  - Monitor labs   - Assess for incontinence   - Turn and reposition patient  - Assist with mobility/ambulation  - Relieve pressure over bony prominences  - Avoid friction and shearing  - Provide appropriate hygiene as needed including keeping skin clean and dry  - Evaluate need for skin moisturizer/barrier cream  - Collaborate with interdisciplinary team   - Patient/family teaching  - Consider wound care consult   Outcome: Progressing

## 2024-10-07 ENCOUNTER — PREP FOR PROCEDURE (OUTPATIENT)
Dept: GASTROENTEROLOGY | Facility: CLINIC | Age: 89
End: 2024-10-07

## 2024-10-07 VITALS
RESPIRATION RATE: 16 BRPM | WEIGHT: 132 LBS | BODY MASS INDEX: 22.53 KG/M2 | OXYGEN SATURATION: 95 % | TEMPERATURE: 97.9 F | HEART RATE: 100 BPM | DIASTOLIC BLOOD PRESSURE: 71 MMHG | HEIGHT: 64 IN | SYSTOLIC BLOOD PRESSURE: 138 MMHG

## 2024-10-07 DIAGNOSIS — K20.80 LOS ANGELES GRADE D ESOPHAGITIS: Primary | ICD-10-CM

## 2024-10-07 PROBLEM — D72.829 LEUKOCYTOSIS: Status: RESOLVED | Noted: 2024-10-02 | Resolved: 2024-10-07

## 2024-10-07 LAB
ANION GAP SERPL CALCULATED.3IONS-SCNC: 6 MMOL/L (ref 4–13)
BUN SERPL-MCNC: 13 MG/DL (ref 5–25)
CALCIUM SERPL-MCNC: 7.6 MG/DL (ref 8.4–10.2)
CHLORIDE SERPL-SCNC: 106 MMOL/L (ref 96–108)
CO2 SERPL-SCNC: 21 MMOL/L (ref 21–32)
CREAT SERPL-MCNC: 0.71 MG/DL (ref 0.6–1.3)
ERYTHROCYTE [DISTWIDTH] IN BLOOD BY AUTOMATED COUNT: 17.4 % (ref 11.6–15.1)
GFR SERPL CREATININE-BSD FRML MDRD: 76 ML/MIN/1.73SQ M
GLUCOSE SERPL-MCNC: 90 MG/DL (ref 65–140)
HCT VFR BLD AUTO: 27.1 % (ref 34.8–46.1)
HGB BLD-MCNC: 8.8 G/DL (ref 11.5–15.4)
MCH RBC QN AUTO: 30.9 PG (ref 26.8–34.3)
MCHC RBC AUTO-ENTMCNC: 32.5 G/DL (ref 31.4–37.4)
MCV RBC AUTO: 95 FL (ref 82–98)
PLATELET # BLD AUTO: 459 THOUSANDS/UL (ref 149–390)
PMV BLD AUTO: 7.8 FL (ref 8.9–12.7)
POTASSIUM SERPL-SCNC: 3.6 MMOL/L (ref 3.5–5.3)
RBC # BLD AUTO: 2.85 MILLION/UL (ref 3.81–5.12)
SODIUM SERPL-SCNC: 133 MMOL/L (ref 135–147)
WBC # BLD AUTO: 9.09 THOUSAND/UL (ref 4.31–10.16)

## 2024-10-07 PROCEDURE — 99239 HOSP IP/OBS DSCHRG MGMT >30: CPT

## 2024-10-07 PROCEDURE — 97530 THERAPEUTIC ACTIVITIES: CPT

## 2024-10-07 PROCEDURE — 85027 COMPLETE CBC AUTOMATED: CPT | Performed by: NURSE PRACTITIONER

## 2024-10-07 PROCEDURE — 80048 BASIC METABOLIC PNL TOTAL CA: CPT | Performed by: NURSE PRACTITIONER

## 2024-10-07 RX ORDER — PANTOPRAZOLE SODIUM 40 MG/1
40 TABLET, DELAYED RELEASE ORAL 2 TIMES DAILY
Start: 2024-10-07

## 2024-10-07 RX ORDER — FERROUS SULFATE 325(65) MG
325 TABLET ORAL
Start: 2024-10-08

## 2024-10-07 RX ORDER — ACETAMINOPHEN 325 MG/1
975 TABLET ORAL EVERY 8 HOURS SCHEDULED
Start: 2024-10-07

## 2024-10-07 RX ADMIN — FLUTICASONE PROPIONATE 1 SPRAY: 50 SPRAY, METERED NASAL at 09:47

## 2024-10-07 RX ADMIN — B-COMPLEX W/ C & FOLIC ACID TAB 1 TABLET: TAB at 09:48

## 2024-10-07 RX ADMIN — ACETAMINOPHEN 975 MG: 325 TABLET ORAL at 05:35

## 2024-10-07 RX ADMIN — PANTOPRAZOLE SODIUM 8 MG/HR: 40 INJECTION, POWDER, FOR SOLUTION INTRAVENOUS at 04:28

## 2024-10-07 RX ADMIN — ACETAMINOPHEN 975 MG: 325 TABLET ORAL at 14:15

## 2024-10-07 RX ADMIN — BIMATOPROST 1 DROP: 0.3 SOLUTION/ DROPS OPHTHALMIC at 09:47

## 2024-10-07 RX ADMIN — CHOLECALCIFEROL (VITAMIN D3) 10 MCG (400 UNIT) TABLET 400 UNITS: at 09:48

## 2024-10-07 RX ADMIN — LISINOPRIL 20 MG: 20 TABLET ORAL at 09:48

## 2024-10-07 RX ADMIN — FERROUS SULFATE TAB 325 MG (65 MG ELEMENTAL FE) 325 MG: 325 (65 FE) TAB at 08:02

## 2024-10-07 NOTE — ASSESSMENT & PLAN NOTE
While working with PT in the ED, patient said she needed to sit down and reportedly became unresponsive and was assisted to chair  Rapid response called 10/2 after patient had another syncopal episode while ambulating to the bathroom  EKG shows NSR with PACs  Troponin negative  ECHO shows EF 65%, systolic function normal, diastolic function normal for age, no major valvular abnormalities  Likely in setting of acute anemia/GI bleed  Patient has been ambulating, no further syncopal episodes, BP stable  Orthostatics negative on day of discharge  Discharge to [facility]

## 2024-10-07 NOTE — ASSESSMENT & PLAN NOTE
liekly reactive in setting of fall and acute fracture  Patient is afebrile and hemodynamically stable  Procalcitonin negative  CXR shows pneumonia vs atelectasis  Given improvement of WBC without antibiotics, doubt acute infection  UA negative, denies urinary symptoms  resolved

## 2024-10-07 NOTE — PLAN OF CARE
Problem: PAIN - ADULT  Goal: Verbalizes/displays adequate comfort level or baseline comfort level  Description: Interventions:  - Encourage patient to monitor pain and request assistance  - Assess pain using appropriate pain scale  - Administer analgesics based on type and severity of pain and evaluate response  - Implement non-pharmacological measures as appropriate and evaluate response  - Consider cultural and social influences on pain and pain management  - Notify physician/advanced practitioner if interventions unsuccessful or patient reports new pain  Outcome: Progressing     Problem: INFECTION - ADULT  Goal: Absence or prevention of progression during hospitalization  Description: INTERVENTIONS:  - Assess and monitor for signs and symptoms of infection  - Monitor lab/diagnostic results  - Monitor all insertion sites, i.e. indwelling lines, tubes, and drains  - Monitor endotracheal if appropriate and nasal secretions for changes in amount and color  - [place] appropriate cooling/warming therapies per order  - Administer medications as ordered  - Instruct and encourage patient and family to use good hand hygiene technique  - Identify and instruct in appropriate isolation precautions for identified infection/condition  Outcome: Progressing  Goal: Absence of fever/infection during neutropenic period  Description: INTERVENTIONS:  - Monitor WBC    Outcome: Progressing     Problem: SAFETY ADULT  Goal: Patient will remain free of falls  Description: INTERVENTIONS:  - Educate patient/family on patient safety including physical limitations  - Instruct patient to call for assistance with activity   - Consult OT/PT to assist with strengthening/mobility   - Keep Call bell within reach  - Keep bed low and locked with side rails adjusted as appropriate  - Keep care items and personal belongings within reach  - Initiate and maintain comfort rounds  - Make Fall Risk Sign visible to staff  - Offer Toileting every 2 Hours,  in advance of need  - Initiate/Maintain bed/chair alarm  - Obtain necessary fall risk management equipment: non-skid yellow socks  - Apply yellow socks and bracelet for high fall risk patients  - Consider moving patient to room near nurses station  Outcome: Progressing  Goal: Maintain or return to baseline ADL function  Description: INTERVENTIONS:  -  Assess patient's ability to carry out ADLs; assess patient's baseline for ADL function and identify physical deficits which impact ability to perform ADLs (bathing, care of mouth/teeth, toileting, grooming, dressing, etc.)  - Assess/evaluate cause of self-care deficits   - Assess range of motion  - Assess patient's mobility; develop plan if impaired  - Assess patient's need for assistive devices and provide as appropriate  - Encourage maximum independence but intervene and supervise when necessary  - Involve family in performance of ADLs  - Assess for home care needs following discharge   - Consider OT consult to assist with ADL evaluation and planning for discharge  - Provide patient education as appropriate  Outcome: Progressing  Goal: Maintains/Returns to pre admission functional level  Description: INTERVENTIONS:  - Perform AM-PAC 6 Click Basic Mobility/ Daily Activity assessment daily.  - Set and communicate daily mobility goal to care team and patient/family/caregiver.   - Collaborate with rehabilitation services on mobility goals if consulted  - Perform Range of Motion 2 times a day.  - Reposition patient every 2 hours.  - Dangle patient 2 times a day  - Stand patient 2 times a day  - Ambulate patient 2 times a day  - Out of bed to chair 2 times a day   - Out of bed for meals 2 times a day  - Out of bed for toileting  - Record patient progress and toleration of activity level   Outcome: Progressing     Problem: DISCHARGE PLANNING  Goal: Discharge to home or other facility with appropriate resources  Description: INTERVENTIONS:  - Identify barriers to discharge  w/patient and caregiver  - Arrange for needed discharge resources and transportation as appropriate  - Identify discharge learning needs (meds, wound care, etc.)  - Arrange for interpretive services to assist at discharge as needed  - Refer to Case Management Department for coordinating discharge planning if the patient needs post-hospital services based on physician/advanced practitioner order or complex needs related to functional status, cognitive ability, or social support system  Outcome: Progressing     Problem: Knowledge Deficit  Goal: Patient/family/caregiver demonstrates understanding of disease process, treatment plan, medications, and discharge instructions  Description: Complete learning assessment and assess knowledge base.  Interventions:  - Provide teaching at level of understanding  - Provide teaching via preferred learning methods  Outcome: Progressing     Problem: Prexisting or High Potential for Compromised Skin Integrity  Goal: Skin integrity is maintained or improved  Description: INTERVENTIONS:  - Identify patients at risk for skin breakdown  - Assess and monitor skin integrity  - Assess and monitor nutrition and hydration status  - Monitor labs   - Assess for incontinence   - Turn and reposition patient  - Assist with mobility/ambulation  - Relieve pressure over bony prominences  - Avoid friction and shearing  - Provide appropriate hygiene as needed including keeping skin clean and dry  - Evaluate need for skin moisturizer/barrier cream  - Collaborate with interdisciplinary team   - Patient/family teaching  - Consider wound care consult   Outcome: Progressing

## 2024-10-07 NOTE — PHYSICAL THERAPY NOTE
"   PT TREATMENT     10/07/24 1125   PT Last Visit   PT Visit Date 10/07/24   Note Type   Note Type Treatment   Pain Assessment   Pain Assessment Tool 0-10   Pain Score No Pain   Restrictions/Precautions   LUE Weight Bearing Per Order NWB   Braces or Orthoses Sling   General   Chart Reviewed Yes   Cognition   Overall Cognitive Status WFL   Arousal/Participation Alert;Cooperative   Attention Within functional limits   Orientation Level Oriented X4   Following Commands Follows multistep commands with increased time or repetition   Subjective   Subjective \"I am going to the care center today\"   Transfers   Sit to Stand 4  Minimal assistance   Stand to Sit 4  Minimal assistance   Stand pivot 4  Minimal assistance   Ambulation/Elevation   Gait pattern   (mildly antalgic L LE improving with activity)   Gait Assistance 4  Minimal assist   Assistive Device SPC   Distance 2x100 feet   Balance   Static Sitting Fair +   Static Standing Fair   Ambulatory Fair -   Activity Tolerance   Activity Tolerance Patient tolerated treatment well;Patient limited by fatigue   Assessment   Prognosis Good   Problem List Decreased strength;Impaired balance;Decreased mobility;Orthopedic restrictions;Decreased range of motion   Assessment Pt demonstrates consistent improvements in function with each PT session s/p L proximal humerus fx.  Pt was able to ambulate today with a cane 2x 100 feet progressing from min assist to close supervison. Pt is very motivated and was previously independent so pt should do well at [facility].    The patient's AM-PAC Basic Mobility Inpatient Short Form Raw Score is 17. A Raw score of greater than 16 suggests the patient may benefit from discharge to home, however pt requires min assist for mobility and lives alone so recommend post acute rehab services. Please also refer to the recommendation of the Physical Therapist for safe discharge planning.         Plan   Treatment/Interventions LE strengthening/ROM;Functional " transfer training;Therapeutic exercise;Gait training;Equipment eval/education;Patient/family training   Discharge Recommendation   Rehab Resource Intensity Level, PT II (Moderate Resource Intensity)   AM-PAC Basic Mobility Inpatient   Turning in Flat Bed Without Bedrails 3   Lying on Back to Sitting on Edge of Flat Bed Without Bedrails 3   Moving Bed to Chair 3   Standing Up From Chair Using Arms 3   Walk in Room 3   Climb 3-5 Stairs With Railing 3   Basic Mobility Inpatient Raw Score 18   Basic Mobility Standardized Score 41.05   [facility] Highest Level Of Mobility   -HLM Goal 6: Walk 10 steps or more   -HLM Achieved 7: Walk 25 feet or more   End of Consult   Patient Position at End of Consult All needs within reach;Bedside chair;Bed/Chair alarm activated   Licensure   [place] License Number  Kathy Mora PT [id number]

## 2024-10-07 NOTE — ASSESSMENT & PLAN NOTE
EGD showed severe grade D erosive esophagitis  Transition protonix gtt to protonix 40 mg bid  Diet advanced to nonulcerogenic diet, patient tolerating  Repeat EGD 2 months

## 2024-10-07 NOTE — INCIDENTAL FINDINGS
The following findings require follow up:  Radiographic finding   Finding: Patchy airspace opacity in the left lower lung zone, which may represent pneumonia in the appropriate clinical setting versus atelectasis. Recommend short-term follow-up chest radiographs in 8 to 12 weeks to assess for resolution.    Follow up required: repeat CXR   Follow up should be done within 3 month(s)    Please notify the following clinician to assist with the follow up:   Dr. Geovani Hollingsworth MD    Incidental finding results were discussed with the Patient by Cathy Brothers PA-C on 10/07/24.   They expressed understanding and all questions answered.

## 2024-10-07 NOTE — ASSESSMENT & PLAN NOTE
Presented to ED with left arm pain after fall at home  XR L humerus shows fracture of left humeral proximal metaphysis  CT LUE confirmed comminuted impacted left humeral neck fracture  Continue supportive care with LUE sling  Scheduled tylenol  PT/OT recommending short-term rehab  Fall precautions  Follow up with orthopedics outpatient

## 2024-10-07 NOTE — DISCHARGE SUMMARY
Discharge Summary - Hospitalist   Name: Pattie Manning 89 y.o. female I MRN: [id number]  Unit/Bed#: 2 [address] Date of Admission: 10/2/2024   Date of Service: 10/7/2024 I Hospital Day: 5     Assessment & Plan  Left humeral fracture  Presented to ED with left arm pain after fall at home  XR L humerus shows fracture of left humeral proximal metaphysis  CT LUE confirmed comminuted impacted left humeral neck fracture  Continue supportive care with LUE sling  Scheduled tylenol  PT/OT recommending short-term rehab  Fall precautions  Follow up with orthopedics outpatient  Acute blood loss anemia  Hemoglobin 8.4 on admission, baseline appears around 11-13 with most recent hemoglobin 13.0 in April  She does report stools have been black recently  B12 and folate WNL, iron panel consistent with JAXON; received IV venofer and started on oral iron  Received 1 unit PRBC on 10/4 for hgb 6.9  CT a/p negative for bleeding, hematoma, or source of bleeding  GI consulted  EGD showed severe grade D erosive esophagitis which is likely source of anemia and melena  Hemoglobin has remained stable  Transition protonix gtt to po protonix 40 mg bid  Repeat EGD in 2 months  Repeat hemoglobin in 1 week at [facility]  Syncope  While working with PT in the ED, patient said she needed to sit down and reportedly became unresponsive and was assisted to chair  Rapid response called 10/2 after patient had another syncopal episode while ambulating to the bathroom  EKG shows NSR with PACs  Troponin negative  ECHO shows EF 65%, systolic function normal, diastolic function normal for age, no major valvular abnormalities  Likely in setting of acute anemia/GI bleed  Patient has been ambulating, no further syncopal episodes, BP stable  Orthostatics negative on day of discharge  Discharge to [facility]  Primary hypertension  Continue lisinopril  BP stable  Dyslipidemia  Continue lipitor  Age-related osteoporosis without current pathological fracture  Continue calcium  and vitamin D supplementation  Esophagitis  EGD showed severe grade D erosive esophagitis  Transition protonix gtt to protonix 40 mg bid  Diet advanced to nonulcerogenic diet, patient tolerating  Repeat EGD 2 months  Leukocytosis (Resolved: 10/7/2024)  liekly reactive in setting of fall and acute fracture  Patient is afebrile and hemodynamically stable  Procalcitonin negative  CXR shows pneumonia vs atelectasis  Given improvement of WBC without antibiotics, doubt acute infection  UA negative, denies urinary symptoms  resolved     Medical Problems       Resolved Problems  Date Reviewed: 10/6/2024            Resolved    Leukocytosis 10/7/2024     Resolved by  Cathy Brothers PA-C        Discharging Physician / Practitioner: Cathy Brothers PA-C  PCP: Geovani Hollingsworth MD  Admission Date:   Admission Orders (From admission, onward)       Ordered        10/02/24 1608  INPATIENT ADMISSION  Once                          Discharge Date: 10/07/24    Consultations During Hospital Stay:  Gastroenterology    Procedures Performed:   10/3/24 EGD  Severe grade D erosive esophagitis, likely source of patient's anemia melena  Large hiatal hernia  Normal duodenum    Significant Findings / Test Results:   XR L humerus: Osteopenia. Comminuted and angulated fracture of the left humeral proximal metaphysis. No other fractures.  CXR: Patchy airspace opacity in the left lower lung zone, which may represent pneumonia in the appropriate clinical setting versus atelectasis. Recommend short-term follow-up chest radiographs in 8 to 12 weeks to assess for resolution.   CT LUE: Comminuted impacted left humeral neck fracture as described.   CT c/a/p: No evidence of hemorrhage in the chest, abdomen, pelvis. Please see separate left upper extremity CT for description of the acute left proximal humerus fracture. Moderate T4 compression fracture, exact age indeterminate, though new compared to the 2018 CT.  Additional chronic thoracic vertebral fractures,  and subacute healing left-sided rib and left inferior pubic ramus fractures as above.     Incidental Findings:   Patchy airspace opacity in the left lower lung zone, which may represent pneumonia in the appropriate clinical setting versus atelectasis. Recommend short-term follow-up chest radiographs in 8 to 12 weeks to assess for resolution.    I reviewed the above mentioned incidental findings with the patient and/or family and they expressed understanding.    Test Results Pending at Discharge (will require follow up):   none     Outpatient Tests Requested:  CXR 8-12 weeks  cbc 1 week    Complications:  none    Reason for Admission: humeral fracture, anemia    Hospital Course:   Pattie Manning is a 89 y.o. female patient who originally presented to the hospital on 10/2/2024 due to left arm pain after a fall at home while standing on a chair to replace a filter in her refrigerator.  XR in the ED showed fracture of left humeral proximal metaphysis. She was placed in LUE sling and pain was relieved with tylenol. She was also noted to have acute anemia compared to prior blood work and patient reported black stools at home. GI was consulted and underwent EGD which showed severe erosive esophagitis. She was placed on protonix gtt and required 1 unit PRBC for hgb 6.9. Hemoglobin has since remained stable and she is tolerating her diet. No further melena noted during hospitalization. Cleared by GI for discharge on protonix 40 mg bid and repeat EGD in 2 months. Patient did have two syncopal episodes early in hospitalization stay, likely in setting of GI bleed and acute anemia.  Since improvement of hemoglobin, patient has had no further syncopal episodes or other symptoms. She will need to follow up with orthopedics outpatient. Patient will be discharged to [facility].    Please see above list of diagnoses and related plan for additional information.     Condition at Discharge: stable    Discharge Day Visit / Exam:  "  Subjective:  Patient reports feeling well today, denies any pain in her arm. Reports she has been eating and drinking well. Denies dizziness, lightheadedness, chest pain, shortness of breath, nausea, heart burn, vomiting, or abdominal pain. She is happy to hear she can go to rehab today. She has been ambulating without difficulty or any symptoms.  Vitals: Blood Pressure: 120/65 (10/07/24 1058)  Pulse: (!) 116 (10/07/24 1058)  Temperature: 97.8 °F (36.6 °C) (10/07/24 0800)  Temp Source: Oral (10/04/24 2114)  Respirations: 18 (10/06/24 0726)  Height: 5' 4\" (162.6 cm) (10/03/24 0700)  Weight - Scale: 59.9 kg (132 lb) (10/03/24 0700)  SpO2: 94 % (10/07/24 1058)  Physical Exam  Vitals and nursing note reviewed.   Constitutional:       General: She is not in acute distress.     Appearance: She is well-developed.   Cardiovascular:      Rate and Rhythm: Normal rate and regular rhythm.   Pulmonary:      Effort: Pulmonary effort is normal. No respiratory distress.      Breath sounds: Normal breath sounds.   Abdominal:      Palpations: Abdomen is soft.      Tenderness: There is no abdominal tenderness.   Musculoskeletal:         General: No swelling.      Comments: LUE in sling   Skin:     General: Skin is warm and dry.   Neurological:      Mental Status: She is alert.   Psychiatric:         Mood and Affect: Mood normal.          Discussion with Family: Attempted to update  (friends Martin and Saira) via phone. Left voicemail.     Discharge instructions/Information to patient and family:   See after visit summary for information provided to patient and family.      Provisions for Follow-Up Care:  See after visit summary for information related to follow-up care and any pertinent home health orders.      Mobility at time of Discharge:   Basic Mobility Inpatient Raw Score: 16  JH-HLM Goal: 5: Stand one or more mins  JH-HLM Achieved: 4: Move to chair/commode  HLM Goal NOT achieved. Continue to encourage mobility " in post discharge setting.     Disposition:   Other Skilled Nursing Facility at [facility]    Planned Readmission: none    Discharge Medications:  See after visit summary for reconciled discharge medications provided to patient and/or family.      Administrative Statements   Discharge Statement:  I have spent a total time of 45 minutes in caring for this patient on the day of the visit/encounter. >30 minutes of time was spent on: Diagnostic results, Instructions for management, Patient and family education, Importance of tx compliance, Counseling / Coordination of care, Documenting in the medical record, Reviewing / ordering tests, medicine, procedures  , and Communicating with other healthcare professionals .    **Please Note: This note may have been constructed using a voice recognition system**

## 2024-10-07 NOTE — PLAN OF CARE
Problem: PAIN - ADULT  Goal: Verbalizes/displays adequate comfort level or baseline comfort level  Description: Interventions:  - Encourage patient to monitor pain and request assistance  - Assess pain using appropriate pain scale  - Administer analgesics based on type and severity of pain and evaluate response  - Implement non-pharmacological measures as appropriate and evaluate response  - Consider cultural and social influences on pain and pain management  - Notify physician/advanced practitioner if interventions unsuccessful or patient reports new pain  Outcome: Progressing     Problem: INFECTION - ADULT  Goal: Absence or prevention of progression during hospitalization  Description: INTERVENTIONS:  - Assess and monitor for signs and symptoms of infection  - Monitor lab/diagnostic results  - Monitor all insertion sites, i.e. indwelling lines, tubes, and drains  - Monitor endotracheal if appropriate and nasal secretions for changes in amount and color  - [place] appropriate cooling/warming therapies per order  - Administer medications as ordered  - Instruct and encourage patient and family to use good hand hygiene technique  - Identify and instruct in appropriate isolation precautions for identified infection/condition  Outcome: Progressing  Goal: Absence of fever/infection during neutropenic period  Description: INTERVENTIONS:  - Monitor WBC    Outcome: Progressing     Problem: SAFETY ADULT  Goal: Patient will remain free of falls  Description: INTERVENTIONS:  - Educate patient/family on patient safety including physical limitations  - Instruct patient to call for assistance with activity   - Consult OT/PT to assist with strengthening/mobility   - Keep Call bell within reach  - Keep bed low and locked with side rails adjusted as appropriate  - Keep care items and personal belongings within reach  - Initiate and maintain comfort rounds  - Make Fall Risk Sign visible to staff  - Offer Toileting every  Hours,  in advance of need  - Initiate/Maintain alarm  - Obtain necessary fall risk management equipment:   - Apply yellow socks and bracelet for high fall risk patients  - Consider moving patient to room near nurses station  Outcome: Progressing  Goal: Maintain or return to baseline ADL function  Description: INTERVENTIONS:  -  Assess patient's ability to carry out ADLs; assess patient's baseline for ADL function and identify physical deficits which impact ability to perform ADLs (bathing, care of mouth/teeth, toileting, grooming, dressing, etc.)  - Assess/evaluate cause of self-care deficits   - Assess range of motion  - Assess patient's mobility; develop plan if impaired  - Assess patient's need for assistive devices and provide as appropriate  - Encourage maximum independence but intervene and supervise when necessary  - Involve family in performance of ADLs  - Assess for home care needs following discharge   - Consider OT consult to assist with ADL evaluation and planning for discharge  - Provide patient education as appropriate  Outcome: Progressing  Goal: Maintains/Returns to pre admission functional level  Description: INTERVENTIONS:  - Perform AM-PAC 6 Click Basic Mobility/ Daily Activity assessment daily.  - Set and communicate daily mobility goal to care team and patient/family/caregiver.   - Collaborate with rehabilitation services on mobility goals if consulted  - Perform Range of Motion  times a day.  - Reposition patient every  hours.  - Dangle patient  times a day  - Stand patient  times a day  - Ambulate patient  times a day  - Out of bed to chair  times a day   - Out of bed for meals  times a day  - Out of bed for toileting  - Record patient progress and toleration of activity level   Outcome: Progressing     Problem: DISCHARGE PLANNING  Goal: Discharge to home or other facility with appropriate resources  Description: INTERVENTIONS:  - Identify barriers to discharge w/patient and caregiver  - Arrange for  needed discharge resources and transportation as appropriate  - Identify discharge learning needs (meds, wound care, etc.)  - Arrange for interpretive services to assist at discharge as needed  - Refer to Case Management Department for coordinating discharge planning if the patient needs post-hospital services based on physician/advanced practitioner order or complex needs related to functional status, cognitive ability, or social support system  Outcome: Progressing     Problem: Knowledge Deficit  Goal: Patient/family/caregiver demonstrates understanding of disease process, treatment plan, medications, and discharge instructions  Description: Complete learning assessment and assess knowledge base.  Interventions:  - Provide teaching at level of understanding  - Provide teaching via preferred learning methods  Outcome: Progressing     Problem: Prexisting or High Potential for Compromised Skin Integrity  Goal: Skin integrity is maintained or improved  Description: INTERVENTIONS:  - Identify patients at risk for skin breakdown  - Assess and monitor skin integrity  - Assess and monitor nutrition and hydration status  - Monitor labs   - Assess for incontinence   - Turn and reposition patient  - Assist with mobility/ambulation  - Relieve pressure over bony prominences  - Avoid friction and shearing  - Provide appropriate hygiene as needed including keeping skin clean and dry  - Evaluate need for skin moisturizer/barrier cream  - Collaborate with interdisciplinary team   - Patient/family teaching  - Consider wound care consult   Outcome: Progressing

## 2024-10-07 NOTE — CASE MANAGEMENT
Case Management Discharge Planning Note    Patient name Pattie Manning  Location [address] [place] [address] [place] [address] MRN [id number]  : 1935 Date 10/7/2024       Current Admission Date: 10/2/2024  Current Admission Diagnosis:Left humeral fracture   Patient Active Problem List    Diagnosis Date Noted Date Diagnosed    Esophagitis 10/05/2024     Left humeral fracture 10/02/2024     Syncope 10/02/2024     Acute blood loss anemia 10/02/2024     Osteoarthritis of multiple joints 01/10/2023     Allergies 01/10/2023     Primary hypertension 2022     Dyslipidemia 2022     Age-related osteoporosis without current pathological fracture 2022       LOS (days): 5  Geometric Mean LOS (GMLOS) (days): 3.2  Days to GMLOS:-1.7     OBJECTIVE:  Risk of Unplanned Readmission Score: 11.7         Current admission status: Inpatient   Preferred Pharmacy:   [id number] [place], [place] - [address] [facility]  [address] [facility] [address]  Phone: [phone] Fax: [phone]    [facility] - [place], [place] - [address]  [address]  [address] [address]  Phone: [phone] Fax: [phone]    Primary Care Provider: Geovani Hollingsworth MD    Primary Insurance: MEDICARE  Secondary Insurance: [employer]    DISCHARGE DETAILS:    Other Referral/Resources/Interventions Provided:  Interventions: Transportation  Referral Comments: RN CM made aware patient medically cleared for discharge today. V transportation requested for 1400 and  for 1615 confirmed by [employer]. All related parties made aware.     Treatment Team Recommendation: Short Term Rehab  Discharge Destination Plan:: Short Term Rehab ([facility])  Transport at Discharge : Wheelchair van        Transported by (Company and Unit #): [employer] [phone]  ETA of Transport (Date): 10/07/24  ETA of Transport (Time): 1615

## 2024-10-07 NOTE — [place] UNIVERSAL TRANSFER FORM
"[place] UNIVERSAL TRANSFER FOR[place]  (ALL ITEMS MUST BE COMPLETED)    1. TRANSFER FROM: [facility]      TRANSFER TO: [facility]    2. DATE OF TRANSFER: 10/7/2024                        TIME OF TRANSFER: 1615    3. PATIENT NAME: Pattie Manning C      YOB: 1935                             GENDER: female    4. LANGUAGE:   English    5. PHYSICIAN NAME:  Milan Lopze DO                   PHONE: [phone]    6. CODE STATUS: Level 1 - Full Code        Out of Hospital DNR Attached: No    7. :                                      :  Extended Emergency Contact Information  Primary Emergency Contact: RoxielourdesMartin  Home Phone: [phone]  Relation: Friend  Secondary Emergency Contact: RoxielourdesSaira  Home Phone: [phone]  Relation: Friend           Health Care Representative/Proxy:  No           Legal Guardian:  No             NAME OF:           HEALTH CARE REPRESENTATIVE/PROXY:                                         OR           LEGAL GUARDIAN, IF NOT :                                               PHONE:  (Day)           (Night)                        (Cell)    8. REASON FOR TRANSFER: (Must include brief medical history and recent changes in physical function or cognition.) Rehab            V/S: /71   Pulse 100   Temp 97.9 °F (36.6 °C)   Resp 16   Ht 5' 4\" (1.626 m)   Wt 59.9 kg (132 lb)   LMP  (LMP Unknown)   SpO2 95%   BMI 22.66 kg/m²           PAIN: None    9. PRIMARY DIAGNOSIS: Left humeral fracture      Secondary Diagnosis:         Pacemaker: No      Internal Defib: No          Mental Health Diagnosis (if Applicable):    10. RESTRAINTS: No     11. RESPIRATORY NEEDS: None    12. ISOLATION/PRECAUTION: None    13. ALLERGY: Patient has no known allergies.    14. SENSORY:       Vision Poor and Hearing Poor    15. SKIN CONDITION: No Wounds    16. DIET: Special (describe)nonulcergenic " diet    17. IV ACCESS: None    18. PERSONAL ITEMS SENT WITH PATIENT: Glasses, cell phone, and b/l hearing aids    19. ATTACHED DOCUMENTS: MUST ATTACH CURRENT MEDICATION INFORMATION Face Sheet    20. AT RISK ALERTS:Falls        HARM TO: N/A    21. WEIGHT BEARING STATUS:         Left Leg: None        Right Leg: None    22. MENTAL STATUS:Alert. Can be forgetful at times.     23. FUNCTION:        Walk: With Help        Transfer: With Help        Toilet: With Help        Feed: With Help    24. IMMUNIZATIONS/SCREENING:     Immunization History   Administered Date(s) Administered    COVID-19 MODERNA VACC 0.5 ML IM 02/25/2021, 03/25/2021    Influenza Split High Dose Preservative Free IM 10/05/2024    Influenza, high dose seasonal 0.7 mL 11/03/2022, 10/16/2023    Zoster Vaccine Recombinant 02/17/2020       25. BOWEL: Incontinent. Last BM 10/7 at 1200.    26. BLADDER: Incontinent. Uses bed pan.    27. SENDING FACILITY CONTACT: [facility]                  Title: Toya Norris RN        Unit: [address] [place] [address]        Phone: [phone]          REC'G FACILITY CONTACT (if known):        Title:        Unit:         Phone:         FORM PREFILLED BY (if applicable)       Title:       Unit:        Phone:         FORM COMPLETED BY Toya Norris RN      Title: Toya Norris RN      Phone: [phone]

## 2024-10-07 NOTE — ASSESSMENT & PLAN NOTE
Hemoglobin 8.4 on admission, baseline appears around 11-13 with most recent hemoglobin 13.0 in April  She does report stools have been black recently  B12 and folate WNL, iron panel consistent with JAXON; received IV venofer and started on oral iron  Received 1 unit PRBC on 10/4 for hgb 6.9  CT a/p negative for bleeding, hematoma, or source of bleeding  GI consulted  EGD showed severe grade D erosive esophagitis which is likely source of anemia and melena  Hemoglobin has remained stable  Transition protonix gtt to po protonix 40 mg bid  Repeat EGD in 2 months  Repeat hemoglobin in 1 week at [facility]

## 2024-10-07 NOTE — NURSING NOTE
Pt discharged from [address] [place] [address]. IV removed prior to discharge. Pt left with all their belongings. Pt left via wheelchair accompanied by transporter. Discharge instructions gone over with receiving facility. All questions answered.

## 2024-10-09 NOTE — PROGRESS NOTES
I contacted patient and explained that request to repeat EGD in two months came from provider and we would like to schedule now before schedule may fill up. Patient requesting a call back later today to schedule (she was eating breakfast and did not have pen or paper to write down appointment information). She is still currently inpatient. She did mention she had abdominal pain earlier but no longer and I did advise she speak to her nurse at the hospital when she has symptoms.

## 2024-10-09 NOTE — PROGRESS NOTES
Pt has no idea why I am calling her to schedule an EGD, is having pain in her stomach and wants a nurse to call her.

## 2024-10-10 ENCOUNTER — TELEPHONE (OUTPATIENT)
Dept: GASTROENTEROLOGY | Facility: CLINIC | Age: 89
End: 2024-10-10

## 2024-10-10 NOTE — TELEPHONE ENCOUNTER
Scheduled date of EGD(as of today): 12/13/24  Physician performing EGD: Dr. Arceo  Location of EGD: [facility]  Instructions reviewed with patient by: tl mailing to pt address on file  Clearances: n/a

## 2024-10-17 ENCOUNTER — OFFICE VISIT (OUTPATIENT)
Dept: OBGYN CLINIC | Facility: CLINIC | Age: 89
End: 2024-10-17
Payer: MEDICARE

## 2024-10-17 ENCOUNTER — APPOINTMENT (OUTPATIENT)
Dept: RADIOLOGY | Facility: CLINIC | Age: 89
End: 2024-10-17
Payer: MEDICARE

## 2024-10-17 VITALS
HEART RATE: 94 BPM | WEIGHT: 129 LBS | DIASTOLIC BLOOD PRESSURE: 64 MMHG | BODY MASS INDEX: 22.02 KG/M2 | HEIGHT: 64 IN | SYSTOLIC BLOOD PRESSURE: 155 MMHG

## 2024-10-17 DIAGNOSIS — S42.202A CLOSED FRACTURE OF PROXIMAL END OF LEFT HUMERUS, UNSPECIFIED FRACTURE MORPHOLOGY, INITIAL ENCOUNTER: Primary | ICD-10-CM

## 2024-10-17 DIAGNOSIS — S42.392A OTHER CLOSED FRACTURE OF SHAFT OF LEFT HUMERUS, INITIAL ENCOUNTER: ICD-10-CM

## 2024-10-17 PROCEDURE — 99203 OFFICE O/P NEW LOW 30 MIN: CPT | Performed by: ORTHOPAEDIC SURGERY

## 2024-10-17 PROCEDURE — 73060 X-RAY EXAM OF HUMERUS: CPT

## 2024-10-17 NOTE — PROGRESS NOTES
Orthopedic Sports Medicine New Patient Visit     Assesment:   89 y.o. female with left proximal humerus fracture    Plan:    We reviewed Pattie's left humerus x-rays today, which do show a displaced, comminuted left proximal humerus fracture. The patient has no pain today and has been compliant with immobilization such that I believe non-operative treatment is reasonable.  To that the fracture morphology and displacement does place her at some risk of nonunion.  However I feel that the best route forward would be attempted to treat this nonoperatively.  As such, we reviewed the diagnosis and non-operative treatment plan for her fracture, including remaining non-weight bearing in the left upper extremity with the sling on at all times. The sling can be removed for hygiene, keeping the arm by her side. We discussed that the length of her stay at rehab depends on her progression and ability to perform ADLs.. Pattie can continue using Tylenol and ice/heat as needed for pain. We will plan to follow up with Pattie in 4 weeks with repeat left humerus x-rays upon arrival.          History of Present Illness:    The patient is a 89 y.o., right hand dominant, female, presenting for initial evaluation of left proximal humerus fracture following a fall on 10/2/24. The patient was admitted to [facility] for this then discharged to rehab where she is still. Today, the patient denies any significant pain in the shoulder. She denies new injury/trauma or numbness/tingling. Pattie has been compliant with sling wear at all times and refrained from using the left arm. The patient denies significant shoulder dysfunction prior to her fall. She uses Tylenol as needed for pain.     The patient does live by herself on 1 level. She does have tenants and a supportive network nearby. Her cousin present with her today lives 20 minutes away.       Shoulder Surgical History:  None    Past Medical, Social and Family History:  Past Medical  History:   Diagnosis Date    Arthritis     GERD (gastroesophageal reflux disease)     Hyperlipidemia     Hypertension     Osteoporosis     Urinary tract infection      Past Surgical History:   Procedure Laterality Date    CATARACT EXTRACTION      COLONOSCOPY N/A 3/2/2017    Procedure: COLONOSCOPY;  Surgeon: Pablo John MD;  Location: [facility];  Service:     JOINT REPLACEMENT Bilateral 2011, 2012    hips    REFRACTIVE SURGERY Right      No Known Allergies  Current Outpatient Medications on File Prior to Visit   Medication Sig Dispense Refill    acetaminophen (TYLENOL) 325 mg tablet Take 3 tablets (975 mg total) by mouth every 8 (eight) hours      aspirin 81 MG tablet Take 81 mg by mouth daily      atorvastatin (LIPITOR) 20 mg tablet TAKE 1 TABLET BY MOUTH DAILY AT  BEDTIME 90 tablet 1    bimatoprost (LUMIGAN) 0.01 % ophthalmic drops 1 drop daily at bedtime      calcium carbonate (OS-MACRINA) 600 MG tablet Take 600 mg by mouth 2 (two) times a day with meals      Cholecalciferol (Vitamin D) 50 MCG (2000 UT) CAPS Take by mouth      ferrous sulfate 325 (65 Fe) mg tablet Take 1 tablet (325 mg total) by mouth daily with breakfast      fluticasone (FLONASE) 50 mcg/act nasal spray 1 spray into each nostril 2 (two) times a day 54.6 mL 1    lisinopril (ZESTRIL) 20 mg tablet TAKE 1 TABLET BY MOUTH DAILY 90 tablet 1    multivitamin (THERAGRAN) TABS Take 1 tablet by mouth daily      pantoprazole (PROTONIX) 40 mg tablet Take 1 tablet (40 mg total) by mouth 2 (two) times a day       No current facility-administered medications on file prior to visit.     Social History     Socioeconomic History    Marital status: Single     Spouse name: Not on file    Number of children: Not on file    Years of education: Not on file    Highest education level: Not on file   Occupational History    Not on file   Tobacco Use    Smoking status: Never     Passive exposure: Never    Smokeless tobacco: Never   Vaping Use    Vaping status: Never Used  "  Substance and Sexual Activity    Alcohol use: Not Currently     Comment: on holidays    Drug use: No    Sexual activity: Never   Other Topics Concern    Not on file   Social History Narrative    Not on file     Social Determinants of Health     Financial Resource Strain: Low Risk  (3/15/2023)    Overall Financial Resource Strain (CARDIA)     Difficulty of Paying Living Expenses: Not very hard   Food Insecurity: No Food Insecurity (10/3/2024)    Hunger Vital Sign     Worried About Running Out of Food in the Last Year: Never true     Ran Out of Food in the Last Year: Never true   Transportation Needs: No Transportation Needs (10/3/2024)    PRAPARE - Transportation     Lack of Transportation (Medical): No     Lack of Transportation (Non-Medical): No   Physical Activity: Not on file   Stress: Not on file   Social Connections: Not on file   Intimate Partner Violence: Not on file   Housing Stability: Low Risk  (10/3/2024)    Housing Stability Vital Sign     Unable to Pay for Housing in the Last Year: No     Number of Times Moved in the Last Year: 0     Homeless in the Last Year: No         I have reviewed the past medical, surgical, social and family history, medications and allergies as documented in the EMR.    Review of systems: ROS is negative other than that noted in the HPI.  Constitutional: Negative for fatigue and fever.   HENT: Negative for sore throat.    Respiratory: Negative for shortness of breath.    Cardiovascular: Negative for chest pain.   Gastrointestinal: Negative for abdominal pain.   Endocrine: Negative for cold intolerance and heat intolerance.   Genitourinary: Negative for flank pain.   Musculoskeletal: Negative for back pain.   Skin: Negative for rash.   Allergic/Immunologic: Negative for immunocompromised state.   Neurological: Negative for dizziness.   Psychiatric/Behavioral: Negative for agitation.      Physical Exam:    Blood pressure 155/64, pulse 94, height 5' 4\" (1.626 m), weight 58.5 kg " (129 lb), not currently breastfeeding.    General/Constitutional: NAD, well developed, well nourished  HENT: Normocephalic, atraumatic  CV: Intact distal pulses, regular rate  Resp: No respiratory distress or labored breathing  Abdomen: soft, nondistended, non tender   Lymphatic: No lymphadenopathy palpated  Neuro: Alert and Oriented x 3, no focal deficits  Psych: Normal mood, normal affect  Skin: Warm, dry, no rashes, no erythema      Shoulder Exam:      Inspection: No ecchymosis, edema, or deformity. No visualized wounds or skin lesions   Palpation: no TTP at fracture site  ROM and strength deferred due to known proximal humerus fracture  Sensory - SILT in the Radial / Ulnar / Median / Axillary nerve distributions  Motor - AIN / PIN / Radial / Ulnar / Median / Axillary motor nerves in tact  Palpable Radial pulse  Cap refill <2secs in all digits        Imaging    I reviewed and interpreted X-rays of the left humerus, which show comminuted and angulated fracture of the left humeral proximal metaphysis.

## 2024-11-14 ENCOUNTER — OFFICE VISIT (OUTPATIENT)
Dept: OBGYN CLINIC | Facility: CLINIC | Age: 89
End: 2024-11-14
Payer: MEDICARE

## 2024-11-14 ENCOUNTER — APPOINTMENT (OUTPATIENT)
Dept: RADIOLOGY | Facility: CLINIC | Age: 89
End: 2024-11-14
Payer: MEDICARE

## 2024-11-14 VITALS
DIASTOLIC BLOOD PRESSURE: 80 MMHG | HEIGHT: 64 IN | SYSTOLIC BLOOD PRESSURE: 134 MMHG | WEIGHT: 129 LBS | HEART RATE: 121 BPM | BODY MASS INDEX: 22.02 KG/M2

## 2024-11-14 DIAGNOSIS — S42.202A CLOSED FRACTURE OF PROXIMAL END OF LEFT HUMERUS, UNSPECIFIED FRACTURE MORPHOLOGY, INITIAL ENCOUNTER: Primary | ICD-10-CM

## 2024-11-14 DIAGNOSIS — S42.202A CLOSED FRACTURE OF PROXIMAL END OF LEFT HUMERUS, UNSPECIFIED FRACTURE MORPHOLOGY, INITIAL ENCOUNTER: ICD-10-CM

## 2024-11-14 PROCEDURE — 73060 X-RAY EXAM OF HUMERUS: CPT

## 2024-11-14 PROCEDURE — 99213 OFFICE O/P EST LOW 20 MIN: CPT | Performed by: ORTHOPAEDIC SURGERY

## 2024-11-14 NOTE — PATIENT INSTRUCTIONS
Please print:    Pattie can begin range of motion exercises with PT/OT out of the sling as tolerated, but avoid any strengthening exercises for another 6 weeks. She can wean out of the sling as comfortable in 2 weeks. If she experiences pain as she removes the sling, she can return to wearing the sling. Refrain from lifting heavier than a coffee cup in the left arm. Plan to follow up in 6 weeks for recheck and updated x-rays.

## 2024-11-14 NOTE — PROGRESS NOTES
Orthopedic Sports Medicine New Patient Visit     Assesment:   89 y.o. female with left proximal humerus fracture    Plan:    We reviewed Pattie's left humerus x-rays today, which show stable positioning of her left proximal humerus fracture with evidence of callus formation. The patient has no pain today and has been compliant with immobilization such that I believe non-operative treatment continues to be reasonable. The fracture morphology and displacement does place her at some risk of nonunion.  However I feel that the best route forward would be attempted to treat this nonoperatively.  As this point, Pattie can begin range of motion exercises with PT/OT out of the sling as tolerated, but avoid any strengthening exercises for another 6 weeks. She can wean out of the sling as comfortable in 2 weeks and begin removing the sling for sleep. If she experiences pain as she removes the sling, she can return to wearing it as needed. Continue to refrain from lifting heavier than a coffee cup in the left arm. Use Tylenol as needed for pain control. Plan to follow up in 6 weeks for recheck and updated x-rays.           History of Present Illness:    The patient is a 89 y.o., right hand dominant, female, presenting for follow up evaluation of left proximal humerus fracture following a fall on 10/2/24. Today, the patient denies pain in the shoulder. She has been compliant with sling wear at all times except for hygiene and refraining from any lifting in the right arm. The patient denies new injury/trauma or numbness/tingling. The patient's cousin present today states that she was recently discharged home from rehab and home PT/OT/nursing are set up to visit her next week. She uses Tylenol as needed for pain.       Shoulder Surgical History:  None    Past Medical, Social and Family History:  Past Medical History:   Diagnosis Date    Arthritis     GERD (gastroesophageal reflux disease)     Hyperlipidemia     Hypertension      Osteoporosis     Urinary tract infection      Past Surgical History:   Procedure Laterality Date    CATARACT EXTRACTION      COLONOSCOPY N/A 3/2/2017    Procedure: COLONOSCOPY;  Surgeon: Pablo John MD;  Location: [facility];  Service:     JOINT REPLACEMENT Bilateral 2011, 2012    hips    REFRACTIVE SURGERY Right      No Known Allergies  Current Outpatient Medications on File Prior to Visit   Medication Sig Dispense Refill    acetaminophen (TYLENOL) 325 mg tablet Take 3 tablets (975 mg total) by mouth every 8 (eight) hours      aspirin 81 MG tablet Take 81 mg by mouth daily      atorvastatin (LIPITOR) 20 mg tablet TAKE 1 TABLET BY MOUTH DAILY AT  BEDTIME 90 tablet 1    bimatoprost (LUMIGAN) 0.01 % ophthalmic drops 1 drop daily at bedtime      calcium carbonate (OS-MACRINA) 600 MG tablet Take 600 mg by mouth 2 (two) times a day with meals      Cholecalciferol (Vitamin D) 50 MCG (2000 UT) CAPS Take by mouth      ferrous sulfate 325 (65 Fe) mg tablet Take 1 tablet (325 mg total) by mouth daily with breakfast      fluticasone (FLONASE) 50 mcg/act nasal spray 1 spray into each nostril 2 (two) times a day 54.6 mL 1    lisinopril (ZESTRIL) 20 mg tablet TAKE 1 TABLET BY MOUTH DAILY 90 tablet 1    multivitamin (THERAGRAN) TABS Take 1 tablet by mouth daily      pantoprazole (PROTONIX) 40 mg tablet Take 1 tablet (40 mg total) by mouth 2 (two) times a day       No current facility-administered medications on file prior to visit.     Social History     Socioeconomic History    Marital status: Single     Spouse name: Not on file    Number of children: Not on file    Years of education: Not on file    Highest education level: Not on file   Occupational History    Not on file   Tobacco Use    Smoking status: Never     Passive exposure: Never    Smokeless tobacco: Never   Vaping Use    Vaping status: Never Used   Substance and Sexual Activity    Alcohol use: Not Currently     Comment: on holidays    Drug use: No    Sexual activity:  "Never   Other Topics Concern    Not on file   Social History Narrative    Not on file     Social Drivers of Health     Financial Resource Strain: Low Risk  (3/15/2023)    Overall Financial Resource Strain (CARDIA)     Difficulty of Paying Living Expenses: Not very hard   Food Insecurity: No Food Insecurity (10/3/2024)    Nursing - Inadequate Food Risk Classification     Worried About Running Out of Food in the Last Year: Never true     Ran Out of Food in the Last Year: Never true     Ran Out of Food in the Last Year: Not on file   Transportation Needs: No Transportation Needs (10/3/2024)    PRAPARE - Transportation     Lack of Transportation (Medical): No     Lack of Transportation (Non-Medical): No   Physical Activity: Not on file   Stress: Not on file   Social Connections: Not on file   Intimate Partner Violence: Not on file   Housing Stability: Low Risk  (10/3/2024)    Housing Stability Vital Sign     Unable to Pay for Housing in the Last Year: No     Number of Times Moved in the Last Year: 0     Homeless in the Last Year: No         I have reviewed the past medical, surgical, social and family history, medications and allergies as documented in the EMR.    Review of systems: ROS is negative other than that noted in the HPI.  Constitutional: Negative for fatigue and fever.   HENT: Negative for sore throat.    Respiratory: Negative for shortness of breath.    Cardiovascular: Negative for chest pain.   Gastrointestinal: Negative for abdominal pain.   Endocrine: Negative for cold intolerance and heat intolerance.   Genitourinary: Negative for flank pain.   Musculoskeletal: Negative for back pain.   Skin: Negative for rash.   Allergic/Immunologic: Negative for immunocompromised state.   Neurological: Negative for dizziness.   Psychiatric/Behavioral: Negative for agitation.      Physical Exam:    Blood pressure 134/80, pulse (!) 121, height 5' 4\" (1.626 m), weight 58.5 kg (129 lb), not currently " breastfeeding.    General/Constitutional: NAD, well developed, well nourished  HENT: Normocephalic, atraumatic  CV: Intact distal pulses, regular rate  Resp: No respiratory distress or labored breathing  Abdomen: soft, nondistended, non tender   Lymphatic: No lymphadenopathy palpated  Neuro: Alert and Oriented x 3, no focal deficits  Psych: Normal mood, normal affect  Skin: Warm, dry, no rashes, no erythema      Shoulder Exam:      Inspection: No ecchymosis, edema, or deformity. No visualized wounds or skin lesions   Palpation: mild TTP at fracture site  No pain with gentle passive ER/IR  Able to hold Empty Can position without pain  Sensory - SILT in the Radial / Ulnar / Median / Axillary nerve distributions  Motor - AIN / PIN / Radial / Ulnar / Median / Axillary motor nerves in tact  Palpable Radial pulse  Cap refill <2secs in all digits        Imaging    I reviewed and interpreted X-rays of the left humerus, which show stable positioning of the fracture with interval healing.

## 2024-12-03 ENCOUNTER — TELEPHONE (OUTPATIENT)
Dept: GASTROENTEROLOGY | Facility: CLINIC | Age: 89
End: 2024-12-03

## 2024-12-03 NOTE — TELEPHONE ENCOUNTER
Tried calling pt to confirm her EGD on 12/13 with Dr. Arceo, however her voice mail is full and I was unable to leave message. Will try pt again tomorrow.

## 2024-12-03 NOTE — TELEPHONE ENCOUNTER
Called and spoke with patient to confirm her procedure on 12/13 with Dr. Arceo. Her neighbor will bring her but she needs the am. She will be called day prior with her arrival time. Reminded her to have nothing to eat after midnight Thursday except she may have clear liquids up to 4 hours prior.

## 2024-12-03 NOTE — PROGRESS NOTES
Office Visit Note  24     Pattie Manning 89 y.o. female MRN: [id number]  : 1935    Assessment:     1. Acute blood loss anemia  Assessment & Plan:  Patient's last hemoglobin was 9.09 on  she had iron panel done while she was in the hospital consistent with iron deficiency anemia received IV Venofer and she also received 1 unit of packed red blood cells on  last hemoglobin was 9.09.  EGD showed severe grade D erosive esophagitis most likely the source of bleeding.  I have ordered a repeat CBC and also she needs a repeat EGD patient is also still taking aspirin we will look at the labs and then decide about the aspirin and also have gastroenterologist input.  Orders:  -     Ferritin; Future  -     TIBC Panel (incl. Iron, TIBC, % Iron Saturation); Future  2. Gastrointestinal hemorrhage, unspecified gastrointestinal hemorrhage type  3. Closed nondisplaced fracture of surgical neck of left humerus with delayed healing, unspecified fracture morphology, subsequent encounter  Assessment & Plan:  Patient is a fracture of the left humerus proximal metaphysis secondary to the fall and had been on sling to begin with with currently she does not have it I reviewed the notes from the orthopedic surgeon no surgical intervention was recommended currently doing much better movements are getting better with the left shoulder conservative management for now patient getting some therapy at home also.  She was in short-term rehab for 4 to 5 weeks post discharge from the hospital  4. Syncope, unspecified syncope type  Assessment & Plan:  Patient had an episode of syncope unresponsive feeling while she was in the hospital at that time evaluation had shown no changes in the cardiogram no focal findings were noted patient recovered very quickly orthostatics were negative.  In the setting of anemia above incident happened.  5. Dyslipidemia  Assessment & Plan:  Continue with Lipitor follow-up with a lipid  profile  Orders:  -     atorvastatin (LIPITOR) 20 mg tablet; Take 1 tablet (20 mg total) by mouth daily at bedtime  -     Lipid panel; Future  6. Primary hypertension  Assessment & Plan:  Patient is currently taking lisinopril 20 mg daily will continue the same today's blood pressure is 129/76.  Orders:  -     lisinopril (ZESTRIL) 20 mg tablet; Take 1 tablet (20 mg total) by mouth daily  7. Age-related osteoporosis without current pathological fracture  Assessment & Plan:  Patient with osteoporosis currently taking calcium and vitamin D she has refused Prolia injections in the past and also medications she has refused in the past for the same  8. Allergy, subsequent encounter  Assessment & Plan:  Continue Flonase nasal spray  9. Esophagitis  Assessment & Plan:  Patient noted to have greatest erosive esophagitis on EGD most likely the source of bleeding patient currently taking Protonix 40 mg twice a day recommend patient to have an repeat EGD done referral made for the same.  10. Prediabetes  -     Comprehensive metabolic panel; Future  11. Screening for deficiency anemia  -     CBC and differential; Future  12. Iron deficiency anemia, unspecified iron deficiency anemia type  -     Ferritin; Future  -     TIBC Panel (incl. Iron, TIBC, % Iron Saturation); Future             Discussion Summary and Plan:  Today's care plan and medications were reviewed with patient in detail and all their questions answered to their satisfaction.    Chief Complaint   Patient presents with    Follow-up      Subjective:  Patient is coming here for a follow-up evaluation with regards to symptoms of hypertension, dyslipidemia, osteoporosis, anemia.  Recently patient was admitted to the hospital secondary to the fall resulting in fracture of the left humerus.  During hospitalization it was noticed she was anemic with a hemoglobin of 6.9 and ended up getting blood transfusions and iron infusions and was also seen by the orthopedic regarding  fracture humerus no intervention was recommended conservative management was given.  Subsequently patient was transferred to a short-term rehab facility where she stayed for almost 5 to 6 weeks and then went home.  Last hemoglobin was 9.09 on October 7 we do not have a repeat hemoglobin        The following portions of the patient's history were reviewed and updated as appropriate: allergies, current medications, past family history, past medical history, past social history, past surgical history and problem list.    Review of Systems   Constitutional:  Negative for chills and fever.   HENT:  Negative for ear pain and sore throat.    Eyes:  Negative for pain and visual disturbance.   Respiratory:  Negative for cough and shortness of breath.    Cardiovascular:  Negative for chest pain and palpitations.   Gastrointestinal:  Negative for abdominal pain and vomiting.   Genitourinary:  Negative for dysuria and hematuria.   Musculoskeletal:  Negative for arthralgias and back pain.   Skin:  Negative for color change and rash.   Neurological:  Negative for seizures and syncope.   All other systems reviewed and are negative.        Historical Information   Patient Active Problem List   Diagnosis    Primary hypertension    Dyslipidemia    Age-related osteoporosis without current pathological fracture    Osteoarthritis of multiple joints    Allergies    Left humeral fracture    Syncope    Acute blood loss anemia    Esophagitis     Past Medical History:   Diagnosis Date    Arthritis     GERD (gastroesophageal reflux disease)     Hyperlipidemia     Hypertension     Osteoporosis     Urinary tract infection      Past Surgical History:   Procedure Laterality Date    CATARACT EXTRACTION      COLONOSCOPY N/A 3/2/2017    Procedure: COLONOSCOPY;  Surgeon: Pablo John MD;  Location: [facility] GI LAB;  Service:     JOINT REPLACEMENT Bilateral 2011, 2012    hips    REFRACTIVE SURGERY Right      Social History     Substance and Sexual  "Activity   Alcohol Use Not Currently    Comment: on holidays     Social History     Substance and Sexual Activity   Drug Use No     Social History     Tobacco Use   Smoking Status Never    Passive exposure: Never   Smokeless Tobacco Never     History reviewed. No pertinent family history.  Health Maintenance Due   Topic    Pneumococcal Vaccine: 65+ Years (1 of 1 - PCV)    RSV Vaccine Age 60+ Years (1 - 1-dose 75+ series)    Zoster Vaccine (2 of 2)    Depression Screening       Meds/Allergies       Current Outpatient Medications:     acetaminophen (TYLENOL) 325 mg tablet, Take 3 tablets (975 mg total) by mouth every 8 (eight) hours, Disp: , Rfl:     aspirin 81 MG tablet, Take 81 mg by mouth daily, Disp: , Rfl:     atorvastatin (LIPITOR) 20 mg tablet, Take 1 tablet (20 mg total) by mouth daily at bedtime, Disp: 90 tablet, Rfl: 1    bimatoprost (LUMIGAN) 0.01 % ophthalmic drops, 1 drop daily at bedtime, Disp: , Rfl:     calcium carbonate (OS-MACRINA) 600 MG tablet, Take 600 mg by mouth 2 (two) times a day with meals, Disp: , Rfl:     Cholecalciferol (Vitamin D) 50 MCG (2000 UT) CAPS, Take by mouth, Disp: , Rfl:     ferrous sulfate 325 (65 Fe) mg tablet, Take 1 tablet (325 mg total) by mouth daily with breakfast, Disp: , Rfl:     fluticasone (FLONASE) 50 mcg/act nasal spray, 1 spray into each nostril 2 (two) times a day, Disp: 54.6 mL, Rfl: 1    lisinopril (ZESTRIL) 20 mg tablet, Take 1 tablet (20 mg total) by mouth daily, Disp: 90 tablet, Rfl: 1    MANGANESE SULFATE IV, Take 1 tablet by mouth daily, Disp: , Rfl:     multivitamin (THERAGRAN) TABS, Take 1 tablet by mouth daily, Disp: , Rfl:     pantoprazole (PROTONIX) 40 mg tablet, Take 1 tablet (40 mg total) by mouth 2 (two) times a day, Disp: , Rfl:       Objective:    Vitals:   /76 (BP Location: Right arm, Patient Position: Sitting, Cuff Size: Standard)   Pulse 100   Ht 5' 4\" (1.626 m)   Wt 55.8 kg (123 lb)   LMP  (LMP Unknown)   SpO2 96%   BMI 21.11 kg/m² " "  Body mass index is 21.11 kg/m².  Vitals:    12/04/24 0841   Weight: 55.8 kg (123 lb)       Physical Exam  Vitals and nursing note reviewed.   Constitutional:       Appearance: Normal appearance.   Eyes:      Comments: Left eye blind   Cardiovascular:      Rate and Rhythm: Normal rate and regular rhythm.      Heart sounds: Normal heart sounds.   Pulmonary:      Effort: Pulmonary effort is normal.      Breath sounds: Normal breath sounds.   Abdominal:      General: Abdomen is flat.      Palpations: Abdomen is soft.   Musculoskeletal:      Cervical back: Normal range of motion and neck supple.      Right lower leg: No edema.      Left lower leg: No edema.   Skin:     General: Skin is warm and dry.   Neurological:      Mental Status: She is alert. Mental status is at baseline.   Psychiatric:         Mood and Affect: Mood normal.         Behavior: Behavior normal.     No results found for this or any previous visit (from the past 8 weeks).     Lab Review   No results displayed because visit has over 200 results.            Geovani Hollingsworth MD        \"This note has been constructed using a voice recognition system.Therefore there may be syntax, spelling, and/or grammatical errors. Please call if you have any questions. \"  "

## 2024-12-04 ENCOUNTER — OFFICE VISIT (OUTPATIENT)
Dept: FAMILY MEDICINE CLINIC | Facility: CLINIC | Age: 89
End: 2024-12-04
Payer: MEDICARE

## 2024-12-04 VITALS
DIASTOLIC BLOOD PRESSURE: 76 MMHG | BODY MASS INDEX: 21 KG/M2 | OXYGEN SATURATION: 96 % | SYSTOLIC BLOOD PRESSURE: 129 MMHG | HEIGHT: 64 IN | HEART RATE: 100 BPM | WEIGHT: 123 LBS

## 2024-12-04 DIAGNOSIS — E78.5 DYSLIPIDEMIA: ICD-10-CM

## 2024-12-04 DIAGNOSIS — D50.9 IRON DEFICIENCY ANEMIA, UNSPECIFIED IRON DEFICIENCY ANEMIA TYPE: ICD-10-CM

## 2024-12-04 DIAGNOSIS — M81.0 AGE-RELATED OSTEOPOROSIS WITHOUT CURRENT PATHOLOGICAL FRACTURE: ICD-10-CM

## 2024-12-04 DIAGNOSIS — I10 PRIMARY HYPERTENSION: ICD-10-CM

## 2024-12-04 DIAGNOSIS — S42.215G CLOSED NONDISPLACED FRACTURE OF SURGICAL NECK OF LEFT HUMERUS WITH DELAYED HEALING, UNSPECIFIED FRACTURE MORPHOLOGY, SUBSEQUENT ENCOUNTER: ICD-10-CM

## 2024-12-04 DIAGNOSIS — R55 SYNCOPE, UNSPECIFIED SYNCOPE TYPE: ICD-10-CM

## 2024-12-04 DIAGNOSIS — D62 ACUTE BLOOD LOSS ANEMIA: Primary | ICD-10-CM

## 2024-12-04 DIAGNOSIS — R73.03 PREDIABETES: ICD-10-CM

## 2024-12-04 DIAGNOSIS — K92.2 GASTROINTESTINAL HEMORRHAGE, UNSPECIFIED GASTROINTESTINAL HEMORRHAGE TYPE: ICD-10-CM

## 2024-12-04 DIAGNOSIS — Z13.0 SCREENING FOR DEFICIENCY ANEMIA: ICD-10-CM

## 2024-12-04 DIAGNOSIS — T78.40XD ALLERGY, SUBSEQUENT ENCOUNTER: ICD-10-CM

## 2024-12-04 DIAGNOSIS — K20.90 ESOPHAGITIS: ICD-10-CM

## 2024-12-04 PROCEDURE — 99214 OFFICE O/P EST MOD 30 MIN: CPT | Performed by: INTERNAL MEDICINE

## 2024-12-04 PROCEDURE — G2211 COMPLEX E/M VISIT ADD ON: HCPCS | Performed by: INTERNAL MEDICINE

## 2024-12-04 RX ORDER — ATORVASTATIN CALCIUM 20 MG/1
20 TABLET, FILM COATED ORAL
Qty: 90 TABLET | Refills: 1 | Status: SHIPPED | OUTPATIENT
Start: 2024-12-04

## 2024-12-04 RX ORDER — LISINOPRIL 20 MG/1
20 TABLET ORAL DAILY
Qty: 90 TABLET | Refills: 1 | Status: SHIPPED | OUTPATIENT
Start: 2024-12-04

## 2024-12-04 NOTE — ASSESSMENT & PLAN NOTE
Patient's last hemoglobin was 9.09 on October 7 she had iron panel done while she was in the hospital consistent with iron deficiency anemia received IV Venofer and she also received 1 unit of packed red blood cells on October 4 last hemoglobin was 9.09.  EGD showed severe grade D erosive esophagitis most likely the source of bleeding.  I have ordered a repeat CBC and also she needs a repeat EGD patient is also still taking aspirin we will look at the labs and then decide about the aspirin and also have gastroenterologist input.

## 2024-12-04 NOTE — ASSESSMENT & PLAN NOTE
Patient is a fracture of the left humerus proximal metaphysis secondary to the fall and had been on sling to begin with with currently she does not have it I reviewed the notes from the orthopedic surgeon no surgical intervention was recommended currently doing much better movements are getting better with the left shoulder conservative management for now patient getting some therapy at home also.  She was in short-term rehab for 4 to 5 weeks post discharge from the hospital

## 2024-12-04 NOTE — ASSESSMENT & PLAN NOTE
Patient noted to have greatest erosive esophagitis on EGD most likely the source of bleeding patient currently taking Protonix 40 mg twice a day recommend patient to have an repeat EGD done referral made for the same.

## 2024-12-04 NOTE — ASSESSMENT & PLAN NOTE
Patient with osteoporosis currently taking calcium and vitamin D she has refused Prolia injections in the past and also medications she has refused in the past for the same

## 2024-12-04 NOTE — ASSESSMENT & PLAN NOTE
Patient is currently taking lisinopril 20 mg daily will continue the same today's blood pressure is 129/76.

## 2024-12-04 NOTE — ASSESSMENT & PLAN NOTE
Patient had an episode of syncope unresponsive feeling while she was in the hospital at that time evaluation had shown no changes in the cardiogram no focal findings were noted patient recovered very quickly orthostatics were negative.  In the setting of anemia above incident happened.

## 2024-12-11 ENCOUNTER — ANESTHESIA (OUTPATIENT)
Dept: ANESTHESIOLOGY | Facility: HOSPITAL | Age: 89
End: 2024-12-11

## 2024-12-11 ENCOUNTER — ANESTHESIA EVENT (OUTPATIENT)
Dept: ANESTHESIOLOGY | Facility: HOSPITAL | Age: 89
End: 2024-12-11

## 2024-12-13 ENCOUNTER — HOSPITAL ENCOUNTER (OUTPATIENT)
Dept: GASTROENTEROLOGY | Facility: AMBULARY SURGERY CENTER | Age: 89
Setting detail: OUTPATIENT SURGERY
End: 2024-12-13
Attending: INTERNAL MEDICINE
Payer: MEDICARE

## 2024-12-13 ENCOUNTER — ANESTHESIA EVENT (OUTPATIENT)
Dept: GASTROENTEROLOGY | Facility: AMBULARY SURGERY CENTER | Age: 89
End: 2024-12-13
Payer: MEDICARE

## 2024-12-13 VITALS
OXYGEN SATURATION: 99 % | HEART RATE: 80 BPM | RESPIRATION RATE: 18 BRPM | SYSTOLIC BLOOD PRESSURE: 170 MMHG | TEMPERATURE: 98.6 F | DIASTOLIC BLOOD PRESSURE: 80 MMHG

## 2024-12-13 DIAGNOSIS — K20.80 LOS ANGELES GRADE D ESOPHAGITIS: ICD-10-CM

## 2024-12-13 DIAGNOSIS — K20.90 ESOPHAGITIS: ICD-10-CM

## 2024-12-13 PROCEDURE — 43239 EGD BIOPSY SINGLE/MULTIPLE: CPT | Performed by: INTERNAL MEDICINE

## 2024-12-13 PROCEDURE — 88305 TISSUE EXAM BY PATHOLOGIST: CPT | Performed by: PATHOLOGY

## 2024-12-13 RX ORDER — PANTOPRAZOLE SODIUM 40 MG/1
40 TABLET, DELAYED RELEASE ORAL 2 TIMES DAILY
Qty: 60 TABLET | Refills: 6 | Status: SHIPPED | OUTPATIENT
Start: 2024-12-13

## 2024-12-13 RX ORDER — LIDOCAINE HYDROCHLORIDE 20 MG/ML
INJECTION, SOLUTION EPIDURAL; INFILTRATION; INTRACAUDAL; PERINEURAL AS NEEDED
Status: DISCONTINUED | OUTPATIENT
Start: 2024-12-13 | End: 2024-12-13

## 2024-12-13 RX ORDER — FAMOTIDINE 40 MG/1
40 TABLET, FILM COATED ORAL
Qty: 30 TABLET | Refills: 3 | Status: SHIPPED | OUTPATIENT
Start: 2024-12-13

## 2024-12-13 RX ORDER — SODIUM CHLORIDE, SODIUM LACTATE, POTASSIUM CHLORIDE, CALCIUM CHLORIDE 600; 310; 30; 20 MG/100ML; MG/100ML; MG/100ML; MG/100ML
125 INJECTION, SOLUTION INTRAVENOUS CONTINUOUS
Status: DISCONTINUED | OUTPATIENT
Start: 2024-12-13 | End: 2024-12-17 | Stop reason: HOSPADM

## 2024-12-13 RX ORDER — PROPOFOL 10 MG/ML
INJECTION, EMULSION INTRAVENOUS AS NEEDED
Status: DISCONTINUED | OUTPATIENT
Start: 2024-12-13 | End: 2024-12-13

## 2024-12-13 RX ORDER — SODIUM CHLORIDE, SODIUM LACTATE, POTASSIUM CHLORIDE, CALCIUM CHLORIDE 600; 310; 30; 20 MG/100ML; MG/100ML; MG/100ML; MG/100ML
INJECTION, SOLUTION INTRAVENOUS CONTINUOUS PRN
Status: DISCONTINUED | OUTPATIENT
Start: 2024-12-13 | End: 2024-12-13

## 2024-12-13 RX ORDER — SUCRALFATE ORAL 1 G/10ML
1 SUSPENSION ORAL 4 TIMES DAILY
Qty: 1200 ML | Refills: 2 | Status: SHIPPED | OUTPATIENT
Start: 2024-12-13 | End: 2024-12-24 | Stop reason: SDUPTHER

## 2024-12-13 RX ORDER — SODIUM CHLORIDE, SODIUM LACTATE, POTASSIUM CHLORIDE, CALCIUM CHLORIDE 600; 310; 30; 20 MG/100ML; MG/100ML; MG/100ML; MG/100ML
125 INJECTION, SOLUTION INTRAVENOUS CONTINUOUS
Status: CANCELLED | OUTPATIENT
Start: 2024-12-13

## 2024-12-13 RX ADMIN — LIDOCAINE HYDROCHLORIDE 60 MG: 20 INJECTION, SOLUTION EPIDURAL; INFILTRATION; INTRACAUDAL; PERINEURAL at 11:31

## 2024-12-13 RX ADMIN — PROPOFOL 80 MG: 10 INJECTION, EMULSION INTRAVENOUS at 11:31

## 2024-12-13 RX ADMIN — SODIUM CHLORIDE, SODIUM LACTATE, POTASSIUM CHLORIDE, AND CALCIUM CHLORIDE: .6; .31; .03; .02 INJECTION, SOLUTION INTRAVENOUS at 11:21

## 2024-12-13 RX ADMIN — PROPOFOL 30 MG: 10 INJECTION, EMULSION INTRAVENOUS at 11:33

## 2024-12-13 RX ADMIN — SODIUM CHLORIDE, SODIUM LACTATE, POTASSIUM CHLORIDE, AND CALCIUM CHLORIDE 125 ML/HR: .6; .31; .03; .02 INJECTION, SOLUTION INTRAVENOUS at 11:06

## 2024-12-13 NOTE — ANESTHESIA POSTPROCEDURE EVALUATION
Post-Op Assessment Note    CV Status:  Stable  Pain Score: 0    Pain management: adequate       Mental Status:  Sleepy   Hydration Status:  Stable   PONV Controlled:  None   Airway Patency:  Patent  Two or more mitigation strategies used for obstructive sleep apnea   Post Op Vitals Reviewed: Yes    No anethesia notable event occurred.    Staff: CRNA           Last Filed PACU Vitals:  Vitals Value Taken Time   Temp 98    Pulse 79    /74    Resp 16    SpO2 99

## 2024-12-13 NOTE — H&P
History and Physical -  [facility]  Pattie Manning 89 y.o. female MRN: [id number]    HPI: Pattie Manning is a 89 y.o. year old female who presents with follow up severe esophagitis.       Review of Systems    Historical Information   Past Medical History:   Diagnosis Date    Arthritis     GERD (gastroesophageal reflux disease)     Hyperlipidemia     Hypertension     Osteoporosis     Urinary tract infection      Past Surgical History:   Procedure Laterality Date    CATARACT EXTRACTION      COLONOSCOPY N/A 03/02/2017    Procedure: COLONOSCOPY;  Surgeon: Pablo John MD;  Location: [facility] GI LAB;  Service:     JOINT REPLACEMENT Bilateral 2011, 2012    hips    REFRACTIVE SURGERY Right      Social History   Social History     Substance and Sexual Activity   Alcohol Use Not Currently    Comment: on holidays     Social History     Substance and Sexual Activity   Drug Use No     Social History     Tobacco Use   Smoking Status Never    Passive exposure: Never   Smokeless Tobacco Never     History reviewed. No pertinent family history.    Meds/Allergies     Not in a hospital admission.    No Known Allergies    Objective     /86   Pulse 102   Temp 98.6 °F (37 °C) (Temporal)   Resp 20   LMP  (LMP Unknown)   SpO2 97%       PHYSICAL EXAM    Gen: NAD  CV: RRR  CHEST: Clear  ABD: soft, NT/ND  EXT: no edema  Neuro: AAO      ASSESSMENT/PLAN:  This is a 89 y.o. year old female here for  follow up severe esophagitis.     PLAN:   Procedure: egd

## 2024-12-13 NOTE — ANESTHESIA PREPROCEDURE EVALUATION
Procedure:  EGD    Relevant Problems   CARDIO   (+) Primary hypertension      HEMATOLOGY   (+) Acute blood loss anemia      MUSCULOSKELETAL   (+) Osteoarthritis of multiple joints        Physical Exam    Airway    Mallampati score: II  TM Distance: >3 FB  Neck ROM: full     Dental    upper dentures and lower dentures    Cardiovascular      Pulmonary      Other Findings  post-pubertal.      Anesthesia Plan  ASA Score- 2     Anesthesia Type- IV sedation with anesthesia with ASA Monitors.         Additional Monitors:     Airway Plan:            Plan Factors-    Chart reviewed. EKG reviewed.   Patient summary reviewed.    Patient is not a current smoker.      There is medical exclusion for perioperative obstructive sleep apnea risk education.        Induction- intravenous.    Postoperative Plan-         Informed Consent- Anesthetic plan and risks discussed with patient.  I personally reviewed this patient with the CRNA. Discussed and agreed on the Anesthesia Plan with the CRNA..

## 2024-12-16 PROCEDURE — 88305 TISSUE EXAM BY PATHOLOGIST: CPT | Performed by: PATHOLOGY

## 2024-12-18 ENCOUNTER — RESULTS FOLLOW-UP (OUTPATIENT)
Dept: GASTROENTEROLOGY | Facility: CLINIC | Age: 89
End: 2024-12-18

## 2024-12-18 ENCOUNTER — PREP FOR PROCEDURE (OUTPATIENT)
Dept: GASTROENTEROLOGY | Facility: CLINIC | Age: 89
End: 2024-12-18

## 2024-12-18 DIAGNOSIS — K21.9 GASTROESOPHAGEAL REFLUX DISEASE, UNSPECIFIED WHETHER ESOPHAGITIS PRESENT: Primary | ICD-10-CM

## 2024-12-18 NOTE — TELEPHONE ENCOUNTER
Tried calling pt to schedule repeat EGD with Dr. Arceo in 3 months at [facility]. However her voice mail is full. Will try pt again tomorrow. Order is in chart.

## 2024-12-18 NOTE — TELEPHONE ENCOUNTER
----- Message from Andrez Arceo MD sent at 12/18/2024  2:30 PM EST -----  Please inform the patient the biopsies from recent upper endoscopy were negative for H. pylori.    As we discussed, she has severe acid reflux, please make sure she is taking the pantoprazole 2 times daily, Carafate at least 3 times daily.    Repeat EGD in 3 months time.

## 2024-12-18 NOTE — RESULT ENCOUNTER NOTE
Please inform the patient the biopsies from recent upper endoscopy were negative for H. pylori.    As we discussed, she has severe acid reflux, please make sure she is taking the pantoprazole 2 times daily, Carafate at least 3 times daily.    Repeat EGD in 3 months time.

## 2024-12-19 NOTE — TELEPHONE ENCOUNTER
Procedure: EGD  Scheduled date of procedure (as of today):03/17/25  Physician performing procedure:Dr. Arceo  Location of procedure:[facility]  Instructions reviewed with patient by: isai  Clearances: n/a    Instructions mailed 12/19/24 isai

## 2024-12-24 DIAGNOSIS — K20.90 ESOPHAGITIS: ICD-10-CM

## 2024-12-24 RX ORDER — SUCRALFATE ORAL 1 G/10ML
1 SUSPENSION ORAL 4 TIMES DAILY
Qty: 1200 ML | Refills: 3 | Status: SHIPPED | OUTPATIENT
Start: 2024-12-24

## 2024-12-26 ENCOUNTER — APPOINTMENT (OUTPATIENT)
Dept: RADIOLOGY | Facility: CLINIC | Age: 89
End: 2024-12-26
Payer: MEDICARE

## 2024-12-26 ENCOUNTER — OFFICE VISIT (OUTPATIENT)
Dept: OBGYN CLINIC | Facility: CLINIC | Age: 89
End: 2024-12-26
Payer: MEDICARE

## 2024-12-26 VITALS — HEIGHT: 64 IN | BODY MASS INDEX: 21 KG/M2 | WEIGHT: 123 LBS

## 2024-12-26 DIAGNOSIS — S42.202A CLOSED FRACTURE OF PROXIMAL END OF LEFT HUMERUS, UNSPECIFIED FRACTURE MORPHOLOGY, INITIAL ENCOUNTER: ICD-10-CM

## 2024-12-26 DIAGNOSIS — S42.292D OTHER CLOSED DISPLACED FRACTURE OF PROXIMAL END OF LEFT HUMERUS WITH ROUTINE HEALING, SUBSEQUENT ENCOUNTER: Primary | ICD-10-CM

## 2024-12-26 PROCEDURE — 99213 OFFICE O/P EST LOW 20 MIN: CPT | Performed by: ORTHOPAEDIC SURGERY

## 2024-12-26 PROCEDURE — 73060 X-RAY EXAM OF HUMERUS: CPT

## 2024-12-26 NOTE — PROGRESS NOTES
Orthopedic Sports Medicine Follow-up Patient Visit     Assesment:   89 y.o. female with proximal left humerus fracture, DOI 10/2/2024    Plan:    Pattie is a pleasant 89 y.o. female who presents 12 weeks status post closed treatment of a proximal left humerus fracture. She is doing well clinically and radiographically today. We reviewed her X-rays together today, demonstrating increased callus formation.  She has no tenderness at her fracture site today.  She has functional motion and strength that allows her to perform ADLs.  She may continue activity as tolerated with no limitations.  At this time, she may discontinue physical therapy for the left shoulder if she would like. She may follow-up on an as needed basis in regards to the left shoulder.      Follow up:    Return if symptoms worsen or fail to improve.        Chief Complaint   Patient presents with    Left Arm - Fracture, Follow-up       History of Present Illness:    The patient is a 89 y.o., right hand dominant, female, who presents 12 weeks status post closed treatment of a proximal left humerus fracture. She denies experiencing significant pain about the left arm. She has been completing home physical therapy. She reports being able to raise the arm to complete ADLs, such as washing her hair. She denies taking any over the counter pain medications at this time. She denies any distal paresthesias of the LUE.    Shoulder Surgical History:  None    Past Medical, Social and Family History:  Past Medical History:   Diagnosis Date    Arthritis     GERD (gastroesophageal reflux disease)     Hyperlipidemia     Hypertension     Osteoporosis     Urinary tract infection      Past Surgical History:   Procedure Laterality Date    CATARACT EXTRACTION      COLONOSCOPY N/A 03/02/2017    Procedure: COLONOSCOPY;  Surgeon: Pablo John MD;  Location: [facility] GI LAB;  Service:     JOINT REPLACEMENT Bilateral 2011, 2012    hips    REFRACTIVE SURGERY Right      No Known  Allergies  Current Outpatient Medications on File Prior to Visit   Medication Sig Dispense Refill    acetaminophen (TYLENOL) 325 mg tablet Take 3 tablets (975 mg total) by mouth every 8 (eight) hours      aspirin 81 MG tablet Take 81 mg by mouth daily      atorvastatin (LIPITOR) 20 mg tablet Take 1 tablet (20 mg total) by mouth daily at bedtime 90 tablet 1    bimatoprost (LUMIGAN) 0.01 % ophthalmic drops 1 drop daily at bedtime      calcium carbonate (OS-MACRINA) 600 MG tablet Take 600 mg by mouth 2 (two) times a day with meals      Cholecalciferol (Vitamin D) 50 MCG (2000 UT) CAPS Take by mouth      famotidine (PEPCID) 40 MG tablet Take 1 tablet (40 mg total) by mouth daily at bedtime 30 tablet 3    ferrous sulfate 325 (65 Fe) mg tablet Take 1 tablet (325 mg total) by mouth daily with breakfast      fluticasone (FLONASE) 50 mcg/act nasal spray 1 spray into each nostril 2 (two) times a day 54.6 mL 1    lisinopril (ZESTRIL) 20 mg tablet Take 1 tablet (20 mg total) by mouth daily 90 tablet 1    MANGANESE SULFATE IV Take 1 tablet by mouth daily      multivitamin (THERAGRAN) TABS Take 1 tablet by mouth daily      pantoprazole (PROTONIX) 40 mg tablet Take 1 tablet (40 mg total) by mouth 2 (two) times a day 60 tablet 6    sucralfate (CARAFATE) 1 g/10 mL suspension Take 10 mL (1 g total) by mouth 4 (four) times a day 1200 mL 3     No current facility-administered medications on file prior to visit.     Social History     Socioeconomic History    Marital status: Single     Spouse name: Not on file    Number of children: Not on file    Years of education: Not on file    Highest education level: Not on file   Occupational History    Not on file   Tobacco Use    Smoking status: Never     Passive exposure: Never    Smokeless tobacco: Never   Vaping Use    Vaping status: Never Used   Substance and Sexual Activity    Alcohol use: Not Currently     Comment: on holidays    Drug use: No    Sexual activity: Never   Other Topics Concern     Not on file   Social History Narrative    Not on file     Social Drivers of Health     Financial Resource Strain: Low Risk  (3/15/2023)    Overall Financial Resource Strain (CARDIA)     Difficulty of Paying Living Expenses: Not very hard   Food Insecurity: No Food Insecurity (10/3/2024)    Nursing - Inadequate Food Risk Classification     Worried About Running Out of Food in the Last Year: Never true     Ran Out of Food in the Last Year: Never true     Ran Out of Food in the Last Year: Not on file   Transportation Needs: No Transportation Needs (11/15/2024)    Received from [facility]    OASIS : Transportation     Lack of Transportation (Medical): No     Lack of Transportation (Non-Medical): No     Patient Unable or Declines to Respond: No   Physical Activity: Not on file   Stress: Not on file   Social Connections: Feeling Socially Integrated (11/15/2024)    Received from [facility]    OASIS : Social Isolation     Frequency of experiencing loneliness or isolation: Rarely   Intimate Partner Violence: Not on file   Housing Stability: Low Risk  (10/3/2024)    Housing Stability Vital Sign     Unable to Pay for Housing in the Last Year: No     Number of Times Moved in the Last Year: 0     Homeless in the Last Year: No         I have reviewed the past medical, surgical, social and family history, medications and allergies as documented in the EMR.    Review of systems: ROS is negative other than that noted in the HPI.  Constitutional: Negative for fatigue and fever.   HENT: Negative for sore throat.    Respiratory: Negative for shortness of breath.    Cardiovascular: Negative for chest pain.   Gastrointestinal: Negative for abdominal pain.   Endocrine: Negative for cold intolerance and heat intolerance.   Genitourinary: Negative for flank pain.   Musculoskeletal: Negative for back pain.   Skin: Negative for rash.   Allergic/Immunologic: Negative for immunocompromised state.   Neurological: Negative  "for dizziness.   Psychiatric/Behavioral: Negative for agitation.      Physical Exam:    Height 5' 4\" (1.626 m), weight 55.8 kg (123 lb), not currently breastfeeding.    General/Constitutional: NAD, well developed, well nourished  HENT: Normocephalic, atraumatic  CV: Intact distal pulses, regular rate  Resp: No respiratory distress or labored breathing  Abdomen: soft, nondistended, non tender   Lymphatic: No lymphadenopathy palpated  Neuro: Alert and Oriented x 3, no focal deficits  Psych: Normal mood, normal affect  Skin: Warm, dry, no rashes, no erythema      Shoulder Exam:      Inspection: No ecchymosis, edema, or deformity. No visualized wounds or skin lesions   Palpation: No specific tenderness  Active Motion:       FF: 100°        ER: 45°        IR: L1  Strength: 3/5 empty can, 3/5 ER,  4/5 IR   Sensory - SILT in the Radial / Ulnar / Median / Axillary nerve distributions  Motor - AIN / PIN / Radial / Ulnar / Median / Axillary motor nerves in tact  Palpable Radial pulse  Cap refill <2secs in all digits          Imaging    I reviewed and interpreted X-rays of the left shoulder which show a stable nondisplaced proximal humerus fracture with interval callus formation.    Scribe Attestation      I,:  Melina Bryant am acting as a scribe while in the presence of the attending physician.:       I,:  Brian Almendarez MD personally performed the services described in this documentation    as scribed in my presence.:              "

## 2025-01-28 ENCOUNTER — APPOINTMENT (OUTPATIENT)
Dept: LAB | Facility: CLINIC | Age: 89
End: 2025-01-28
Payer: MEDICARE

## 2025-01-28 DIAGNOSIS — D50.9 IRON DEFICIENCY ANEMIA, UNSPECIFIED IRON DEFICIENCY ANEMIA TYPE: ICD-10-CM

## 2025-01-28 DIAGNOSIS — Z13.0 SCREENING FOR DEFICIENCY ANEMIA: ICD-10-CM

## 2025-01-28 DIAGNOSIS — R73.03 PREDIABETES: ICD-10-CM

## 2025-01-28 DIAGNOSIS — D62 ACUTE BLOOD LOSS ANEMIA: ICD-10-CM

## 2025-01-28 DIAGNOSIS — E78.5 DYSLIPIDEMIA: ICD-10-CM

## 2025-01-28 LAB
ALBUMIN SERPL BCG-MCNC: 4.1 G/DL (ref 3.5–5)
ALP SERPL-CCNC: 94 U/L (ref 34–104)
ALT SERPL W P-5'-P-CCNC: 10 U/L (ref 7–52)
ANION GAP SERPL CALCULATED.3IONS-SCNC: 11 MMOL/L (ref 4–13)
AST SERPL W P-5'-P-CCNC: 16 U/L (ref 13–39)
BASOPHILS # BLD AUTO: 0.04 THOUSANDS/ΜL (ref 0–0.1)
BASOPHILS NFR BLD AUTO: 1 % (ref 0–1)
BILIRUB SERPL-MCNC: 0.65 MG/DL (ref 0.2–1)
BUN SERPL-MCNC: 11 MG/DL (ref 5–25)
CALCIUM SERPL-MCNC: 9.6 MG/DL (ref 8.4–10.2)
CHLORIDE SERPL-SCNC: 100 MMOL/L (ref 96–108)
CHOLEST SERPL-MCNC: 165 MG/DL (ref ?–200)
CO2 SERPL-SCNC: 27 MMOL/L (ref 21–32)
CREAT SERPL-MCNC: 0.83 MG/DL (ref 0.6–1.3)
EOSINOPHIL # BLD AUTO: 0.04 THOUSAND/ΜL (ref 0–0.61)
EOSINOPHIL NFR BLD AUTO: 1 % (ref 0–6)
ERYTHROCYTE [DISTWIDTH] IN BLOOD BY AUTOMATED COUNT: 13.6 % (ref 11.6–15.1)
FERRITIN SERPL-MCNC: 65 NG/ML (ref 11–307)
GFR SERPL CREATININE-BSD FRML MDRD: 63 ML/MIN/1.73SQ M
GLUCOSE P FAST SERPL-MCNC: 82 MG/DL (ref 65–99)
HCT VFR BLD AUTO: 40.5 % (ref 34.8–46.1)
HDLC SERPL-MCNC: 44 MG/DL
HGB BLD-MCNC: 13.1 G/DL (ref 11.5–15.4)
IMM GRANULOCYTES # BLD AUTO: 0.01 THOUSAND/UL (ref 0–0.2)
IMM GRANULOCYTES NFR BLD AUTO: 0 % (ref 0–2)
IRON SATN MFR SERPL: 31 % (ref 15–50)
IRON SERPL-MCNC: 91 UG/DL (ref 50–212)
LDLC SERPL CALC-MCNC: 96 MG/DL (ref 0–100)
LYMPHOCYTES # BLD AUTO: 1.39 THOUSANDS/ΜL (ref 0.6–4.47)
LYMPHOCYTES NFR BLD AUTO: 30 % (ref 14–44)
MCH RBC QN AUTO: 29.7 PG (ref 26.8–34.3)
MCHC RBC AUTO-ENTMCNC: 32.3 G/DL (ref 31.4–37.4)
MCV RBC AUTO: 92 FL (ref 82–98)
MONOCYTES # BLD AUTO: 0.53 THOUSAND/ΜL (ref 0.17–1.22)
MONOCYTES NFR BLD AUTO: 12 % (ref 4–12)
NEUTROPHILS # BLD AUTO: 2.61 THOUSANDS/ΜL (ref 1.85–7.62)
NEUTS SEG NFR BLD AUTO: 56 % (ref 43–75)
NONHDLC SERPL-MCNC: 121 MG/DL
NRBC BLD AUTO-RTO: 0 /100 WBCS
PLATELET # BLD AUTO: 321 THOUSANDS/UL (ref 149–390)
PMV BLD AUTO: 9.6 FL (ref 8.9–12.7)
POTASSIUM SERPL-SCNC: 3.7 MMOL/L (ref 3.5–5.3)
PROT SERPL-MCNC: 7.1 G/DL (ref 6.4–8.4)
RBC # BLD AUTO: 4.41 MILLION/UL (ref 3.81–5.12)
SODIUM SERPL-SCNC: 138 MMOL/L (ref 135–147)
TIBC SERPL-MCNC: 296.8 UG/DL (ref 250–450)
TRANSFERRIN SERPL-MCNC: 212 MG/DL (ref 203–362)
TRIGL SERPL-MCNC: 123 MG/DL (ref ?–150)
UIBC SERPL-MCNC: 206 UG/DL (ref 155–355)
WBC # BLD AUTO: 4.62 THOUSAND/UL (ref 4.31–10.16)

## 2025-01-28 PROCEDURE — 80053 COMPREHEN METABOLIC PANEL: CPT

## 2025-01-28 PROCEDURE — 83540 ASSAY OF IRON: CPT

## 2025-01-28 PROCEDURE — 82728 ASSAY OF FERRITIN: CPT

## 2025-01-28 PROCEDURE — 80061 LIPID PANEL: CPT

## 2025-01-28 PROCEDURE — 83550 IRON BINDING TEST: CPT

## 2025-01-28 PROCEDURE — 36415 COLL VENOUS BLD VENIPUNCTURE: CPT

## 2025-01-28 PROCEDURE — 85025 COMPLETE CBC W/AUTO DIFF WBC: CPT

## 2025-02-06 ENCOUNTER — TELEPHONE (OUTPATIENT)
Dept: FAMILY MEDICINE CLINIC | Facility: CLINIC | Age: 89
End: 2025-02-06

## 2025-02-06 NOTE — TELEPHONE ENCOUNTER
Called pt back she wanted to come in a little later due to her having another appt was able to accomodate her to come in at 11

## 2025-02-10 ENCOUNTER — OFFICE VISIT (OUTPATIENT)
Dept: FAMILY MEDICINE CLINIC | Facility: CLINIC | Age: 89
End: 2025-02-10
Payer: MEDICARE

## 2025-02-10 ENCOUNTER — IOP CHECK (OUTPATIENT)
Dept: URBAN - METROPOLITAN AREA CLINIC 6 | Facility: CLINIC | Age: 89
End: 2025-02-10

## 2025-02-10 VITALS
DIASTOLIC BLOOD PRESSURE: 76 MMHG | HEIGHT: 64 IN | BODY MASS INDEX: 21.17 KG/M2 | HEART RATE: 64 BPM | WEIGHT: 124 LBS | OXYGEN SATURATION: 98 % | SYSTOLIC BLOOD PRESSURE: 120 MMHG

## 2025-02-10 DIAGNOSIS — D62 ACUTE BLOOD LOSS ANEMIA: Primary | ICD-10-CM

## 2025-02-10 DIAGNOSIS — M15.0 PRIMARY OSTEOARTHRITIS INVOLVING MULTIPLE JOINTS: ICD-10-CM

## 2025-02-10 DIAGNOSIS — K20.90 ESOPHAGITIS: ICD-10-CM

## 2025-02-10 DIAGNOSIS — H40.1112: ICD-10-CM

## 2025-02-10 DIAGNOSIS — S42.215G CLOSED NONDISPLACED FRACTURE OF SURGICAL NECK OF LEFT HUMERUS WITH DELAYED HEALING, UNSPECIFIED FRACTURE MORPHOLOGY, SUBSEQUENT ENCOUNTER: ICD-10-CM

## 2025-02-10 DIAGNOSIS — M81.0 AGE-RELATED OSTEOPOROSIS WITHOUT CURRENT PATHOLOGICAL FRACTURE: ICD-10-CM

## 2025-02-10 DIAGNOSIS — R55 SYNCOPE, UNSPECIFIED SYNCOPE TYPE: ICD-10-CM

## 2025-02-10 DIAGNOSIS — I10 PRIMARY HYPERTENSION: ICD-10-CM

## 2025-02-10 DIAGNOSIS — H44.512: ICD-10-CM

## 2025-02-10 DIAGNOSIS — E78.5 DYSLIPIDEMIA: ICD-10-CM

## 2025-02-10 PROCEDURE — 92012 INTRM OPH EXAM EST PATIENT: CPT

## 2025-02-10 PROCEDURE — 92083 EXTENDED VISUAL FIELD XM: CPT

## 2025-02-10 PROCEDURE — G2211 COMPLEX E/M VISIT ADD ON: HCPCS | Performed by: INTERNAL MEDICINE

## 2025-02-10 PROCEDURE — 99214 OFFICE O/P EST MOD 30 MIN: CPT | Performed by: INTERNAL MEDICINE

## 2025-02-10 ASSESSMENT — TONOMETRY
OD_IOP_MMHG: 18
OS_IOP_MMHG: 20

## 2025-02-10 ASSESSMENT — VISUAL ACUITY: OD_CC: 20/20-1

## 2025-02-10 NOTE — ASSESSMENT & PLAN NOTE
Orthopedic note appreciated patient doing much better with regards to the fracture of the left humerus proximal metaphysis secondary to the fall able to move her left shoulder orthopedic note appreciated.  Patient completed the physical therapy

## 2025-02-10 NOTE — ASSESSMENT & PLAN NOTE
Patient's hemoglobin came up to 13.1 it was 8.8 in October and subsequently it was 9.09 on October 7.  Patient had received IV infusion with Venofer when she was admitted to the hospital as she had received 1 packed red blood cells unit EGD showing grade D erosive esophagitis most likely was the source of bleeding however patient at present time is not complaining of any symptoms of abdominal pain she is going for repeat EGD in the month of March however patient is still taking the aspirin at present time.  Patient's iron TIBC ferritin levels also came back in the range.

## 2025-02-10 NOTE — PROGRESS NOTES
Name: Pattie Manning      : 1935      MRN: [id number]  Encounter Provider: Geovani Hollingsworth MD  Encounter Date: 2/10/2025   Encounter department: [facility]  :  Assessment & Plan  Acute blood loss anemia  Patient's hemoglobin came up to 13.1 it was 8.8 in October and subsequently it was 9.09 on .  Patient had received IV infusion with Venofer when she was admitted to the hospital as she had received 1 packed red blood cells unit EGD showing grade D erosive esophagitis most likely was the source of bleeding however patient at present time is not complaining of any symptoms of abdominal pain she is going for repeat EGD in the month of March however patient is still taking the aspirin at present time.  Patient's iron TIBC ferritin levels also came back in the range.       Esophagitis  Patient noted to have erosive esophagitis on EGD most likely was the source of bleeding when she was admitted to the hospital patient is currently taking 40 mg of Protonix twice a day and she is also on sucralfate and she has a follow-up appointment with a gastroenterologist for an EGD in the month of        Age-related osteoporosis without current pathological fracture  Patient is currently taking calcium and vitamin D patient refused Prolia injections we are avoiding any oral medication because of her recent EGD findings       Dyslipidemia  Lipid profile shows cholesterol 165 triglycerides 123 HDL is 44 LDL is 96 currently patient is taking atorvastatin 20 mg at bedtime       Primary hypertension  Continue with lisinopril 20 mg daily today's blood pressure is 120/76       Closed nondisplaced fracture of surgical neck of left humerus with delayed healing, unspecified fracture morphology, subsequent encounter  Orthopedic note appreciated patient doing much better with regards to the fracture of the left humerus proximal metaphysis secondary to the fall able to move her left shoulder  "orthopedic note appreciated.  Patient completed the physical therapy       Primary osteoarthritis involving multiple joints  Patient will take Tylenol as needed status post bilateral hip replacements       Syncope, unspecified syncope type  No more episodes of syncope which has happened once when she was in the hospital workup in the hospital was negative.  Stable at present time.  Denies any symptoms of chest pain palpitation shortness of breath lightheadedness.             Depression Screening and Follow-up Plan: Patient was screened for depression during today's encounter. They screened negative with a PHQ-2 score of 0.      History of Present Illness   Patient was recently in the hospital with GI bleed received blood transfusion and iron infusions I saw her after the admission to the hospital continue with iron supplements repeat CBC showed good hemoglobin improvement.    Patient's left upper extremity fracture of the humerus is much better able to move all left shoulder also without much any problem.    Reviewed medications reviewed lab reports patient scheduled to have an upper endoscopy done on March 17.      Review of Systems   Constitutional:  Negative for chills and fever.   HENT:  Negative for ear pain and sore throat.    Eyes:  Negative for pain and visual disturbance.   Respiratory:  Negative for cough and shortness of breath.    Cardiovascular:  Negative for chest pain and palpitations.   Gastrointestinal:  Negative for abdominal pain and vomiting.   Genitourinary:  Negative for dysuria and hematuria.   Musculoskeletal:  Positive for arthralgias. Negative for back pain.   Skin:  Negative for color change and rash.   Neurological:  Negative for seizures and syncope.   All other systems reviewed and are negative.      Objective   /76 (BP Location: Right arm, Patient Position: Sitting, Cuff Size: Standard)   Pulse 64   Ht 5' 4\" (1.626 m)   Wt 56.2 kg (124 lb)   LMP  (LMP Unknown)   SpO2 98%   " BMI 21.28 kg/m²      Physical Exam  Vitals and nursing note reviewed.   Constitutional:       General: She is not in acute distress.     Appearance: She is well-developed.   HENT:      Head: Normocephalic and atraumatic.   Eyes:      Conjunctiva/sclera: Conjunctivae normal.   Cardiovascular:      Rate and Rhythm: Normal rate and regular rhythm.      Heart sounds: No murmur heard.  Pulmonary:      Effort: Pulmonary effort is normal. No respiratory distress.      Breath sounds: Normal breath sounds.   Abdominal:      Palpations: Abdomen is soft.      Tenderness: There is no abdominal tenderness.   Musculoskeletal:         General: No swelling.      Cervical back: Neck supple.   Skin:     General: Skin is warm and dry.      Capillary Refill: Capillary refill takes less than 2 seconds.   Neurological:      Mental Status: She is alert.   Psychiatric:         Mood and Affect: Mood normal.       Recent Results (from the past 8 weeks)   Comprehensive metabolic panel    Collection Time: 01/28/25 10:09 AM   Result Value Ref Range    Sodium 138 135 - 147 mmol/L    Potassium 3.7 3.5 - 5.3 mmol/L    Chloride 100 96 - 108 mmol/L    CO2 27 21 - 32 mmol/L    ANION GAP 11 4 - 13 mmol/L    BUN 11 5 - 25 mg/dL    Creatinine 0.83 0.60 - 1.30 mg/dL    Glucose, Fasting 82 65 - 99 mg/dL    Calcium 9.6 8.4 - 10.2 mg/dL    AST 16 13 - 39 U/L    ALT 10 7 - 52 U/L    Alkaline Phosphatase 94 34 - 104 U/L    Total Protein 7.1 6.4 - 8.4 g/dL    Albumin 4.1 3.5 - 5.0 g/dL    Total Bilirubin 0.65 0.20 - 1.00 mg/dL    eGFR 63 ml/min/1.73sq m   CBC and differential    Collection Time: 01/28/25 10:09 AM   Result Value Ref Range    WBC 4.62 4.31 - 10.16 Thousand/uL    RBC 4.41 3.81 - 5.12 Million/uL    Hemoglobin 13.1 11.5 - 15.4 g/dL    Hematocrit 40.5 34.8 - 46.1 %    MCV 92 82 - 98 fL    MCH 29.7 26.8 - 34.3 pg    MCHC 32.3 31.4 - 37.4 g/dL    RDW 13.6 11.6 - 15.1 %    MPV 9.6 8.9 - 12.7 fL    Platelets 321 149 - 390 Thousands/uL    nRBC 0 /100  WBCs    Segmented % 56 43 - 75 %    Immature Grans % 0 0 - 2 %    Lymphocytes % 30 14 - 44 %    Monocytes % 12 4 - 12 %    Eosinophils Relative 1 0 - 6 %    Basophils Relative 1 0 - 1 %    Absolute Neutrophils 2.61 1.85 - 7.62 Thousands/µL    Absolute Immature Grans 0.01 0.00 - 0.20 Thousand/uL    Absolute Lymphocytes 1.39 0.60 - 4.47 Thousands/µL    Absolute Monocytes 0.53 0.17 - 1.22 Thousand/µL    Eosinophils Absolute 0.04 0.00 - 0.61 Thousand/µL    Basophils Absolute 0.04 0.00 - 0.10 Thousands/µL   Lipid panel    Collection Time: 01/28/25 10:09 AM   Result Value Ref Range    Cholesterol 165 See Comment mg/dL    Triglycerides 123 See Comment mg/dL    HDL, Direct 44 (L) >=50 mg/dL    LDL Calculated 96 0 - 100 mg/dL    Non-HDL-Chol (CHOL-HDL) 121 mg/dl   Ferritin    Collection Time: 01/28/25 10:09 AM   Result Value Ref Range    Ferritin 65 11 - 307 ng/mL   TIBC Panel (incl. Iron, TIBC, % Iron Saturation)    Collection Time: 01/28/25 10:09 AM   Result Value Ref Range    Iron Saturation 31 15 - 50 %    TIBC 296.8 250 - 450 ug/dL    Iron 91 50 - 212 ug/dL    Transferrin 212 203 - 362 mg/dL    UIBC 206 155 - 355 ug/dL

## 2025-02-10 NOTE — ASSESSMENT & PLAN NOTE
Lipid profile shows cholesterol 165 triglycerides 123 HDL is 44 LDL is 96 currently patient is taking atorvastatin 20 mg at bedtime

## 2025-02-10 NOTE — ASSESSMENT & PLAN NOTE
Patient is currently taking calcium and vitamin D patient refused Prolia injections we are avoiding any oral medication because of her recent EGD findings

## 2025-02-10 NOTE — ASSESSMENT & PLAN NOTE
Patient noted to have erosive esophagitis on EGD most likely was the source of bleeding when she was admitted to the hospital patient is currently taking 40 mg of Protonix twice a day and she is also on sucralfate and she has a follow-up appointment with a gastroenterologist for an EGD in the month of March 17

## 2025-02-10 NOTE — ASSESSMENT & PLAN NOTE
No more episodes of syncope which has happened once when she was in the hospital workup in the hospital was negative.  Stable at present time.  Denies any symptoms of chest pain palpitation shortness of breath lightheadedness.

## 2025-02-24 DIAGNOSIS — T78.40XD ALLERGY, SUBSEQUENT ENCOUNTER: ICD-10-CM

## 2025-02-24 NOTE — TELEPHONE ENCOUNTER
Reason for call:   [x] Refill   [] Prior Auth  [] Other:     Office:   [x] PCP/Provider -   [] Specialty/Provider -     Medication: FLONASE    Dose/Frequency: 50 MCG    Quantity: 54.6    Pharmacy: [facility]    Does the patient have enough for 3 days?   [x] Yes   [] No - Send as HP to POD

## 2025-02-25 RX ORDER — FLUTICASONE PROPIONATE 50 MCG
1 SPRAY, SUSPENSION (ML) NASAL 2 TIMES DAILY
Qty: 54.6 ML | Refills: 1 | Status: SHIPPED | OUTPATIENT
Start: 2025-02-25

## 2025-03-03 ENCOUNTER — ANESTHESIA (OUTPATIENT)
Dept: ANESTHESIOLOGY | Facility: HOSPITAL | Age: 89
End: 2025-03-03

## 2025-03-03 ENCOUNTER — ANESTHESIA EVENT (OUTPATIENT)
Dept: ANESTHESIOLOGY | Facility: HOSPITAL | Age: 89
End: 2025-03-03

## 2025-03-14 ENCOUNTER — TELEPHONE (OUTPATIENT)
Dept: GASTROENTEROLOGY | Facility: AMBULARY SURGERY CENTER | Age: 89
End: 2025-03-14

## 2025-03-16 RX ORDER — SODIUM CHLORIDE, SODIUM LACTATE, POTASSIUM CHLORIDE, CALCIUM CHLORIDE 600; 310; 30; 20 MG/100ML; MG/100ML; MG/100ML; MG/100ML
100 INJECTION, SOLUTION INTRAVENOUS CONTINUOUS
Status: CANCELLED | OUTPATIENT
Start: 2025-03-16

## 2025-03-17 ENCOUNTER — HOSPITAL ENCOUNTER (OUTPATIENT)
Dept: GASTROENTEROLOGY | Facility: AMBULARY SURGERY CENTER | Age: 89
Setting detail: OUTPATIENT SURGERY
Discharge: HOME | End: 2025-03-17
Attending: INTERNAL MEDICINE
Payer: MEDICARE

## 2025-03-17 ENCOUNTER — ANESTHESIA (OUTPATIENT)
Dept: GASTROENTEROLOGY | Facility: AMBULARY SURGERY CENTER | Age: 89
End: 2025-03-17
Payer: MEDICARE

## 2025-03-17 VITALS
OXYGEN SATURATION: 95 % | HEART RATE: 76 BPM | DIASTOLIC BLOOD PRESSURE: 94 MMHG | TEMPERATURE: 97.6 F | SYSTOLIC BLOOD PRESSURE: 190 MMHG | RESPIRATION RATE: 18 BRPM

## 2025-03-17 DIAGNOSIS — K20.90 ESOPHAGITIS: ICD-10-CM

## 2025-03-17 DIAGNOSIS — K21.9 GASTROESOPHAGEAL REFLUX DISEASE, UNSPECIFIED WHETHER ESOPHAGITIS PRESENT: ICD-10-CM

## 2025-03-17 PROCEDURE — 88342 IMHCHEM/IMCYTCHM 1ST ANTB: CPT | Performed by: PATHOLOGY

## 2025-03-17 PROCEDURE — 88305 TISSUE EXAM BY PATHOLOGIST: CPT | Performed by: PATHOLOGY

## 2025-03-17 PROCEDURE — 88360 TUMOR IMMUNOHISTOCHEM/MANUAL: CPT | Performed by: PATHOLOGY

## 2025-03-17 RX ORDER — SODIUM CHLORIDE, SODIUM LACTATE, POTASSIUM CHLORIDE, CALCIUM CHLORIDE 600; 310; 30; 20 MG/100ML; MG/100ML; MG/100ML; MG/100ML
INJECTION, SOLUTION INTRAVENOUS CONTINUOUS PRN
Status: DISCONTINUED | OUTPATIENT
Start: 2025-03-17 | End: 2025-03-17

## 2025-03-17 RX ORDER — SODIUM CHLORIDE, SODIUM LACTATE, POTASSIUM CHLORIDE, CALCIUM CHLORIDE 600; 310; 30; 20 MG/100ML; MG/100ML; MG/100ML; MG/100ML
100 INJECTION, SOLUTION INTRAVENOUS CONTINUOUS
Status: DISCONTINUED | OUTPATIENT
Start: 2025-03-17 | End: 2025-03-21 | Stop reason: HOSPADM

## 2025-03-17 RX ORDER — PROPOFOL 10 MG/ML
INJECTION, EMULSION INTRAVENOUS AS NEEDED
Status: DISCONTINUED | OUTPATIENT
Start: 2025-03-17 | End: 2025-03-17

## 2025-03-17 RX ORDER — LIDOCAINE HYDROCHLORIDE 10 MG/ML
INJECTION, SOLUTION EPIDURAL; INFILTRATION; INTRACAUDAL; PERINEURAL AS NEEDED
Status: DISCONTINUED | OUTPATIENT
Start: 2025-03-17 | End: 2025-03-17

## 2025-03-17 RX ORDER — PANTOPRAZOLE SODIUM 40 MG/1
40 TABLET, DELAYED RELEASE ORAL DAILY
Qty: 30 TABLET | Refills: 6 | Status: SHIPPED | OUTPATIENT
Start: 2025-03-17

## 2025-03-17 RX ADMIN — SODIUM CHLORIDE, SODIUM LACTATE, POTASSIUM CHLORIDE, AND CALCIUM CHLORIDE: .6; .31; .03; .02 INJECTION, SOLUTION INTRAVENOUS at 09:00

## 2025-03-17 RX ADMIN — PROPOFOL 100 MG: 10 INJECTION, EMULSION INTRAVENOUS at 10:17

## 2025-03-17 RX ADMIN — PROPOFOL 40 MG: 10 INJECTION, EMULSION INTRAVENOUS at 10:19

## 2025-03-17 RX ADMIN — LIDOCAINE HYDROCHLORIDE 5 ML: 10 INJECTION, SOLUTION EPIDURAL; INFILTRATION; INTRACAUDAL; PERINEURAL at 10:17

## 2025-03-17 NOTE — PERIOPERATIVE NURSING NOTE
Bedside report given to Sharona PHILLIP RN. Bed in lowest locked position with both rails up. Call bell within reach.

## 2025-03-17 NOTE — H&P
History and Physical - [facility]  Pattie Manning 89 y.o. female MRN: [id number]    HPI: Pattie Manning is a 89 y.o. year old female who presents with follow up GERD, esophagitis.       Review of Systems    Historical Information   Past Medical History:   Diagnosis Date    Arthritis     GERD (gastroesophageal reflux disease)     Hyperlipidemia     Hypertension     Osteoporosis     Urinary tract infection      Past Surgical History:   Procedure Laterality Date    CATARACT EXTRACTION      COLONOSCOPY N/A 03/02/2017    Procedure: COLONOSCOPY;  Surgeon: Pablo John MD;  Location: [facility];  Service:     JOINT REPLACEMENT Bilateral 2011, 2012    hips    REFRACTIVE SURGERY Right      Social History   Social History     Substance and Sexual Activity   Alcohol Use Not Currently    Comment: on holidays     Social History     Substance and Sexual Activity   Drug Use No     Social History     Tobacco Use   Smoking Status Never    Passive exposure: Never   Smokeless Tobacco Never     No family history on file.    Meds/Allergies     Not in a hospital admission.    No Known Allergies    Objective     BP (!) 195/102   Pulse 75   Temp 97.6 °F (36.4 °C) (Temporal)   Resp 18   LMP  (LMP Unknown)   SpO2 96%       PHYSICAL EXAM    Gen: NAD  CV: RRR  CHEST: Clear  ABD: soft, NT/ND  EXT: no edema  Neuro: AAO      ASSESSMENT/PLAN:  This is a 89 y.o. year old female here for  follow up GERD, esophagitis.     PLAN:   Procedure: egd

## 2025-03-17 NOTE — ANESTHESIA POSTPROCEDURE EVALUATION
Post-Op Assessment Note    CV Status:  Stable         Mental Status:  Alert   PONV Controlled:  Controlled   Airway Patency:  Patent     Post Op Vitals Reviewed: Yes    No anethesia notable event occurred.    Staff: CRNA           Last Filed PACU Vitals:  Vitals Value Taken Time   Temp     Pulse 76    /72    Resp 20    SpO2 95

## 2025-03-17 NOTE — ANESTHESIA PREPROCEDURE EVALUATION
Procedure:  EGD    Relevant Problems   ANESTHESIA (within normal limits)      CARDIO   (+) Primary hypertension      ENDO (within normal limits)      HEMATOLOGY   (+) Acute blood loss anemia      MUSCULOSKELETAL   (+) Osteoarthritis of multiple joints      PULMONARY (within normal limits)        Physical Exam    Airway    Mallampati score: II  TM Distance: >3 FB  Neck ROM: limited     Dental       Cardiovascular  Cardiovascular exam normal    Pulmonary  Pulmonary exam normal     Other Findings  post-pubertal.      Anesthesia Plan  ASA Score- 3     Anesthesia Type- IV sedation with anesthesia with ASA Monitors.         Additional Monitors:     Airway Plan:            Plan Factors-Exercise tolerance (METS): >4 METS.    Chart reviewed. EKG reviewed.  Existing labs reviewed. Patient summary reviewed.    Patient is not a current smoker.              Induction-     Postoperative Plan-         Informed Consent- Anesthetic plan and risks discussed with patient.  I personally reviewed this patient with the CRNA. Discussed and agreed on the Anesthesia Plan with the CRNA..      NPO Status:  Vitals Value Taken Time   Date of last liquid 03/16/25 03/17/25 0920   Time of last liquid 2000 03/17/25 0920   Date of last solid 03/16/25 03/17/25 0920   Time of last solid 2000 03/17/25 0920

## 2025-03-24 PROCEDURE — 88360 TUMOR IMMUNOHISTOCHEM/MANUAL: CPT | Performed by: PATHOLOGY

## 2025-03-24 PROCEDURE — 88342 IMHCHEM/IMCYTCHM 1ST ANTB: CPT | Performed by: PATHOLOGY

## 2025-03-24 PROCEDURE — 88305 TISSUE EXAM BY PATHOLOGIST: CPT | Performed by: PATHOLOGY

## 2025-04-01 ENCOUNTER — RESULTS FOLLOW-UP (OUTPATIENT)
Age: 89
End: 2025-04-01

## 2025-04-02 NOTE — RESULT ENCOUNTER NOTE
Great news, the biopsies from your recent upper endoscopy were negative for Pham's esophagus.    As we discussed, please follow-up in the office in 6 months time, please call with any questions or concerns.

## 2025-04-09 ENCOUNTER — TELEPHONE (OUTPATIENT)
Age: 89
End: 2025-04-09

## 2025-04-09 NOTE — PROGRESS NOTES
Name: Pattie Manning      : 1935      MRN: [id number]  Encounter Provider: Geovani Hollingsworth MD  Encounter Date: 4/10/2025   Encounter department: [facility]  :  Assessment & Plan  Acute blood loss anemia    Patient had acute blood loss anemia hemoglobin went down to 8.8 patient had received blood transfusions IV Venofer last hemoglobin was 13.1.  Patient denies abdominal pain nausea vomiting she had a repeat upper endoscopy done actual report is not in the chart pathology report unremarkable.  EGD done in the hospital when she was admitted showed erosive esophagitis most likely the source of bleeding         Age-related osteoporosis without current pathological fracture  Patient with osteoporosis refuses to go on any medication as she developed some side effects in the past.       Esophagitis  Patient with history of erosive esophagitis and GI bleed stable at present time I do not have the report of the endoscopy done last month.  Orders:  •  pantoprazole (PROTONIX) 40 mg tablet; Take 1 tablet (40 mg total) by mouth daily    Primary osteoarthritis involving multiple joints  Recommend Tylenol as needed       Primary hypertension  Blood pressure today is 132/78 patient is on lisinopril 20 mg daily continue with the same.       Screening for deficiency anemia  Since patient had GI bleed we will follow-up with repeat CBC in 3 months from now  Orders:  •  CBC and differential; Future    Screening for thyroid disorder    Orders:  •  TSH, 3rd generation with Free T4 reflex; Future    Pre-diabetes    Orders:  •  Comprehensive metabolic panel; Future  •  Hemoglobin A1C; Future  •  UA w Reflex to Microscopic w Reflex to Culture; Future    Dyslipidemia    Orders:  •  Lipid panel; Future           History of Present Illness   Patient is coming for a follow-up evaluation with history of osteoporosis, dyslipidemia, hypertension, allergies, history of acute blood loss anemia.  Patient lives alone  and was recommended to have Meals on Wheels however patient at present time want to cook on her own at home.  In spite of explaining the need for Meals on Wheels which have already signed.    Medications reviewed labs reviewed patient denies chest pain palpitation shortness of breath.  Discussed with patient at length all the medications reviewed      Review of Systems   Constitutional:  Negative for chills and fever.   HENT:  Negative for ear pain and sore throat.    Eyes:  Negative for pain and visual disturbance.   Respiratory:  Negative for cough and shortness of breath.    Cardiovascular:  Negative for chest pain and palpitations.   Gastrointestinal:  Negative for abdominal pain and vomiting.   Genitourinary:  Negative for dysuria and hematuria.   Musculoskeletal:  Negative for arthralgias and back pain.   Skin:  Negative for color change and rash.   Neurological:  Negative for seizures and syncope.   All other systems reviewed and are negative.      Objective   /78 (BP Location: Right arm, Patient Position: Sitting, Cuff Size: Standard)   Pulse 91   Wt 59.2 kg (130 lb 9.6 oz)   LMP  (LMP Unknown)   SpO2 99%   BMI 22.42 kg/m²      Physical Exam  Vitals and nursing note reviewed.   Constitutional:       General: She is not in acute distress.     Appearance: She is well-developed.   HENT:      Head: Normocephalic and atraumatic.   Eyes:      Conjunctiva/sclera: Conjunctivae normal.   Cardiovascular:      Rate and Rhythm: Normal rate and regular rhythm.      Heart sounds: No murmur heard.  Pulmonary:      Effort: Pulmonary effort is normal. No respiratory distress.      Breath sounds: Normal breath sounds.   Abdominal:      Palpations: Abdomen is soft.      Tenderness: There is no abdominal tenderness.   Musculoskeletal:         General: No swelling.      Cervical back: Neck supple.   Skin:     General: Skin is warm and dry.      Capillary Refill: Capillary refill takes less than 2 seconds.  "  Neurological:      Mental Status: She is alert.   Psychiatric:         Mood and Affect: Mood normal.       Recent Results (from the past 8 weeks)   Tissue Exam    Collection Time: 03/17/25 10:19 AM   Result Value Ref Range    Case Report       Surgical Pathology Report                         Case: [id number]                                  Authorizing Provider:  Andrez Arceo MD            Collected:           03/17/2025 1019              Ordering Location:     [facility]   Received:            03/17/2025 1242                                     Center                                                                       Pathologist:           Craig Crandall MD                                                           Specimen:    Esophagogastric junction, ge junction bx r/o barretts                                      Final Diagnosis       A. Gastroesophageal junction, \"GE junction biopsy rule out Pham's,\" Biopsy:  - Fragments of gastroesophageal junction mucosa with moderate acute inflammation and moderate chronic inflammation  - Focus of epithelial atypia, favor reactive changes  - Negative for intestinal metaplasia  - Squamous mucosa with reactive changes, including acanthosis and basal cell hyperplasia  - Additional levels examined  - Negative for dysplasia or carcinmoa          Note       Immunohistochemistry was performed on block A1 with adequate controls. Ki-67 and p53 do not demonstrate increased expression.          Additional Information       All reported additional testing was performed with appropriately reactive controls.  These tests were developed and their performance characteristics determined by [facility] or appropriate performing facility, though some tests may be performed on tissues which have not been validated for performance characteristics (such as staining performed on alcohol exposed cell blocks and decalcified tissues).  Results should be " "interpreted with caution and in the context of the patients’ clinical condition. These tests may not be cleared or approved by the U.S. Food and Drug Administration, though the FDA has determined that such clearance or approval is not necessary. These tests are used for clinical purposes and they should not be regarded as investigational or for research. This laboratory has been approved by CLIA 88, designated as a high-complexity laboratory and is qualified to perform these tests.  .Interpretation performed at [facility], [facility] [address]        Gross Description       A. The specimen is received in formalin, labeled with the patient's name and hospital number, and is designated \" GE junction biopsy rule out Pham's\".  It consists of a 0.5 cm in greatest dimension irregular fragment of pale-tan white soft tissue.  The specimen is submitted in toto in screen cassette A1.    Note: The estimated total formalin fixation time based upon information provided by the submitting clinician and the standard processing schedule is under 72 hours.    [place]      Clinical Information       FINDINGS:  · The duodenum appeared normal.  · The antrum appeared normal.  · 4 cm hiatal hernia - GE junction 34 cm from the incisors, diaphragmatic impression 38 cm from the incisors:  Hill classification: Grade IV  · Irregular Z-line 34 cm from the incisors; performed cold forceps biopsy          "

## 2025-04-09 NOTE — TELEPHONE ENCOUNTER
Call received from [facility]. Stating that the just received a from that was faxed to them but it was missing an answer to question number 3. Please refax to [phone].

## 2025-04-10 ENCOUNTER — OFFICE VISIT (OUTPATIENT)
Dept: FAMILY MEDICINE CLINIC | Facility: CLINIC | Age: 89
End: 2025-04-10
Payer: MEDICARE

## 2025-04-10 VITALS
DIASTOLIC BLOOD PRESSURE: 78 MMHG | WEIGHT: 130.6 LBS | BODY MASS INDEX: 22.42 KG/M2 | OXYGEN SATURATION: 99 % | HEART RATE: 91 BPM | SYSTOLIC BLOOD PRESSURE: 132 MMHG

## 2025-04-10 DIAGNOSIS — Z13.29 SCREENING FOR THYROID DISORDER: ICD-10-CM

## 2025-04-10 DIAGNOSIS — M15.0 PRIMARY OSTEOARTHRITIS INVOLVING MULTIPLE JOINTS: ICD-10-CM

## 2025-04-10 DIAGNOSIS — R73.03 PRE-DIABETES: ICD-10-CM

## 2025-04-10 DIAGNOSIS — E78.5 DYSLIPIDEMIA: ICD-10-CM

## 2025-04-10 DIAGNOSIS — K20.90 ESOPHAGITIS: ICD-10-CM

## 2025-04-10 DIAGNOSIS — D62 ACUTE BLOOD LOSS ANEMIA: Primary | ICD-10-CM

## 2025-04-10 DIAGNOSIS — Z13.0 SCREENING FOR DEFICIENCY ANEMIA: ICD-10-CM

## 2025-04-10 DIAGNOSIS — I10 PRIMARY HYPERTENSION: ICD-10-CM

## 2025-04-10 DIAGNOSIS — M81.0 AGE-RELATED OSTEOPOROSIS WITHOUT CURRENT PATHOLOGICAL FRACTURE: ICD-10-CM

## 2025-04-10 PROCEDURE — G2211 COMPLEX E/M VISIT ADD ON: HCPCS | Performed by: INTERNAL MEDICINE

## 2025-04-10 PROCEDURE — 99214 OFFICE O/P EST MOD 30 MIN: CPT | Performed by: INTERNAL MEDICINE

## 2025-04-10 RX ORDER — PANTOPRAZOLE SODIUM 40 MG/1
40 TABLET, DELAYED RELEASE ORAL DAILY
Qty: 90 TABLET | Refills: 1 | Status: SHIPPED | OUTPATIENT
Start: 2025-04-10

## 2025-04-10 NOTE — ASSESSMENT & PLAN NOTE
Patient with history of erosive esophagitis and GI bleed stable at present time I do not have the report of the endoscopy done last month.  Orders:  •  pantoprazole (PROTONIX) 40 mg tablet; Take 1 tablet (40 mg total) by mouth daily

## 2025-04-10 NOTE — ASSESSMENT & PLAN NOTE
Patient with osteoporosis refuses to go on any medication as she developed some side effects in the past.

## 2025-05-25 DIAGNOSIS — I10 PRIMARY HYPERTENSION: ICD-10-CM

## 2025-05-25 RX ORDER — LISINOPRIL 20 MG/1
20 TABLET ORAL DAILY
Qty: 90 TABLET | Refills: 3 | Status: SHIPPED | OUTPATIENT
Start: 2025-05-25

## 2025-06-25 ENCOUNTER — APPOINTMENT (OUTPATIENT)
Dept: LAB | Facility: CLINIC | Age: 89
End: 2025-06-25
Attending: INTERNAL MEDICINE
Payer: MEDICARE

## 2025-06-25 DIAGNOSIS — E78.5 DYSLIPIDEMIA: ICD-10-CM

## 2025-06-25 DIAGNOSIS — Z13.29 SCREENING FOR THYROID DISORDER: ICD-10-CM

## 2025-06-25 DIAGNOSIS — R73.03 PRE-DIABETES: ICD-10-CM

## 2025-06-25 DIAGNOSIS — Z13.0 SCREENING FOR DEFICIENCY ANEMIA: ICD-10-CM

## 2025-06-25 LAB
ALBUMIN SERPL BCG-MCNC: 4.5 G/DL (ref 3.5–5)
ALP SERPL-CCNC: 83 U/L (ref 34–104)
ALT SERPL W P-5'-P-CCNC: 11 U/L (ref 7–52)
ANION GAP SERPL CALCULATED.3IONS-SCNC: 9 MMOL/L (ref 4–13)
AST SERPL W P-5'-P-CCNC: 20 U/L (ref 13–39)
BACTERIA UR QL AUTO: ABNORMAL /HPF
BASOPHILS # BLD AUTO: 0.04 THOUSANDS/ÂΜL (ref 0–0.1)
BASOPHILS NFR BLD AUTO: 1 % (ref 0–1)
BILIRUB SERPL-MCNC: 0.6 MG/DL (ref 0.2–1)
BILIRUB UR QL STRIP: NEGATIVE
BUN SERPL-MCNC: 18 MG/DL (ref 5–25)
CALCIUM SERPL-MCNC: 9.5 MG/DL (ref 8.4–10.2)
CHLORIDE SERPL-SCNC: 102 MMOL/L (ref 96–108)
CHOLEST SERPL-MCNC: 152 MG/DL (ref ?–200)
CLARITY UR: CLEAR
CO2 SERPL-SCNC: 27 MMOL/L (ref 21–32)
COLOR UR: ABNORMAL
CREAT SERPL-MCNC: 0.86 MG/DL (ref 0.6–1.3)
EOSINOPHIL # BLD AUTO: 0.12 THOUSAND/ÂΜL (ref 0–0.61)
EOSINOPHIL NFR BLD AUTO: 2 % (ref 0–6)
ERYTHROCYTE [DISTWIDTH] IN BLOOD BY AUTOMATED COUNT: 13.2 % (ref 11.6–15.1)
EST. AVERAGE GLUCOSE BLD GHB EST-MCNC: 117 MG/DL
GFR SERPL CREATININE-BSD FRML MDRD: 60 ML/MIN/1.73SQ M
GLUCOSE P FAST SERPL-MCNC: 80 MG/DL (ref 65–99)
GLUCOSE UR STRIP-MCNC: NEGATIVE MG/DL
HBA1C MFR BLD: 5.7 %
HCT VFR BLD AUTO: 41.1 % (ref 34.8–46.1)
HDLC SERPL-MCNC: 42 MG/DL
HGB BLD-MCNC: 13.4 G/DL (ref 11.5–15.4)
HGB UR QL STRIP.AUTO: ABNORMAL
IMM GRANULOCYTES # BLD AUTO: 0.01 THOUSAND/UL (ref 0–0.2)
IMM GRANULOCYTES NFR BLD AUTO: 0 % (ref 0–2)
KETONES UR STRIP-MCNC: NEGATIVE MG/DL
LDLC SERPL CALC-MCNC: 81 MG/DL (ref 0–100)
LEUKOCYTE ESTERASE UR QL STRIP: ABNORMAL
LYMPHOCYTES # BLD AUTO: 1.69 THOUSANDS/ÂΜL (ref 0.6–4.47)
LYMPHOCYTES NFR BLD AUTO: 31 % (ref 14–44)
MCH RBC QN AUTO: 31.2 PG (ref 26.8–34.3)
MCHC RBC AUTO-ENTMCNC: 32.6 G/DL (ref 31.4–37.4)
MCV RBC AUTO: 96 FL (ref 82–98)
MONOCYTES # BLD AUTO: 0.6 THOUSAND/ÂΜL (ref 0.17–1.22)
MONOCYTES NFR BLD AUTO: 11 % (ref 4–12)
NEUTROPHILS # BLD AUTO: 3.02 THOUSANDS/ÂΜL (ref 1.85–7.62)
NEUTS SEG NFR BLD AUTO: 55 % (ref 43–75)
NITRITE UR QL STRIP: POSITIVE
NON-SQ EPI CELLS URNS QL MICRO: ABNORMAL /HPF
NONHDLC SERPL-MCNC: 110 MG/DL
NRBC BLD AUTO-RTO: 0 /100 WBCS
PH UR STRIP.AUTO: 7 [PH]
PLATELET # BLD AUTO: 312 THOUSANDS/UL (ref 149–390)
PMV BLD AUTO: 9.6 FL (ref 8.9–12.7)
POTASSIUM SERPL-SCNC: 4 MMOL/L (ref 3.5–5.3)
PROT SERPL-MCNC: 7.1 G/DL (ref 6.4–8.4)
PROT UR STRIP-MCNC: NEGATIVE MG/DL
RBC # BLD AUTO: 4.3 MILLION/UL (ref 3.81–5.12)
RBC #/AREA URNS AUTO: ABNORMAL /HPF
SODIUM SERPL-SCNC: 138 MMOL/L (ref 135–147)
SP GR UR STRIP.AUTO: 1.01 (ref 1–1.03)
TRIGL SERPL-MCNC: 144 MG/DL (ref ?–150)
TSH SERPL DL<=0.05 MIU/L-ACNC: 2.59 UIU/ML (ref 0.45–4.5)
UROBILINOGEN UR STRIP-ACNC: <2 MG/DL
WBC # BLD AUTO: 5.48 THOUSAND/UL (ref 4.31–10.16)
WBC #/AREA URNS AUTO: ABNORMAL /HPF

## 2025-06-25 PROCEDURE — 87077 CULTURE AEROBIC IDENTIFY: CPT

## 2025-06-25 PROCEDURE — 85025 COMPLETE CBC W/AUTO DIFF WBC: CPT

## 2025-06-25 PROCEDURE — 84443 ASSAY THYROID STIM HORMONE: CPT

## 2025-06-25 PROCEDURE — 87186 SC STD MICRODIL/AGAR DIL: CPT

## 2025-06-25 PROCEDURE — 83036 HEMOGLOBIN GLYCOSYLATED A1C: CPT

## 2025-06-25 PROCEDURE — 80061 LIPID PANEL: CPT

## 2025-06-25 PROCEDURE — 36415 COLL VENOUS BLD VENIPUNCTURE: CPT

## 2025-06-25 PROCEDURE — 80053 COMPREHEN METABOLIC PANEL: CPT

## 2025-06-25 PROCEDURE — 81001 URINALYSIS AUTO W/SCOPE: CPT

## 2025-06-25 PROCEDURE — 87086 URINE CULTURE/COLONY COUNT: CPT

## 2025-06-27 LAB — BACTERIA UR CULT: ABNORMAL

## 2025-07-16 ENCOUNTER — OFFICE VISIT (OUTPATIENT)
Age: 89
End: 2025-07-16
Payer: MEDICARE

## 2025-07-16 VITALS
OXYGEN SATURATION: 94 % | HEIGHT: 64 IN | DIASTOLIC BLOOD PRESSURE: 70 MMHG | WEIGHT: 130 LBS | HEART RATE: 100 BPM | SYSTOLIC BLOOD PRESSURE: 142 MMHG | BODY MASS INDEX: 22.2 KG/M2

## 2025-07-16 DIAGNOSIS — I10 PRIMARY HYPERTENSION: ICD-10-CM

## 2025-07-16 DIAGNOSIS — K20.90 ESOPHAGITIS: ICD-10-CM

## 2025-07-16 DIAGNOSIS — N39.0 URINARY TRACT INFECTION WITHOUT HEMATURIA, SITE UNSPECIFIED: Primary | ICD-10-CM

## 2025-07-16 DIAGNOSIS — D62 ACUTE BLOOD LOSS ANEMIA: ICD-10-CM

## 2025-07-16 DIAGNOSIS — Z00.00 MEDICARE ANNUAL WELLNESS VISIT, SUBSEQUENT: ICD-10-CM

## 2025-07-16 DIAGNOSIS — M15.0 PRIMARY OSTEOARTHRITIS INVOLVING MULTIPLE JOINTS: ICD-10-CM

## 2025-07-16 DIAGNOSIS — M81.0 AGE-RELATED OSTEOPOROSIS WITHOUT CURRENT PATHOLOGICAL FRACTURE: ICD-10-CM

## 2025-07-16 DIAGNOSIS — E78.5 DYSLIPIDEMIA: ICD-10-CM

## 2025-07-16 PROCEDURE — 99214 OFFICE O/P EST MOD 30 MIN: CPT | Performed by: INTERNAL MEDICINE

## 2025-07-16 PROCEDURE — G0439 PPPS, SUBSEQ VISIT: HCPCS | Performed by: INTERNAL MEDICINE

## 2025-07-16 PROCEDURE — G2211 COMPLEX E/M VISIT ADD ON: HCPCS | Performed by: INTERNAL MEDICINE

## 2025-07-16 RX ORDER — ATORVASTATIN CALCIUM 20 MG/1
20 TABLET, FILM COATED ORAL
Qty: 90 TABLET | Refills: 1 | Status: SHIPPED | OUTPATIENT
Start: 2025-07-16

## 2025-07-16 NOTE — ASSESSMENT & PLAN NOTE
Patient does have increased frequency with urination urine culture showing Klebsiella sensitive to Keflex will order the same recommend patient to take cranberry capsules also  Orders:  •  cephalexin (KEFLEX) 250 mg capsule; Take 1 capsule (250 mg total) by mouth every 6 (six) hours for 7 days

## 2025-07-16 NOTE — ASSESSMENT & PLAN NOTE
Patient could not tolerate the medications refusing to go for    DEXA scan recommend patient take calcium and vitamin D

## 2025-07-16 NOTE — PROGRESS NOTES
Name: Pattie Manning      : 1935      MRN: [id number]  Encounter Provider: Geovani Hollingsworth MD  Encounter Date: 2025   Encounter department: [facility]  :  Assessment & Plan  Urinary tract infection without hematuria, site unspecified  Patient does have increased frequency with urination urine culture showing Klebsiella sensitive to Keflex will order the same recommend patient to take cranberry capsules also  Orders:  •  cephalexin (KEFLEX) 250 mg capsule; Take 1 capsule (250 mg total) by mouth every 6 (six) hours for 7 days    Acute blood loss anemia  Patient has an acute blood loss pneumonia globin is Better Currently on Iron Supplements Last Hemoglobin Is at 13.4 Hematocrit 41.1 However She Needs an Upper Endoscopy Done Because of the Upper GI Bleed Most Likely Secondary to Esophagitis Will Order the Same.  Orders:  •  Ambulatory referral to Gastroenterology; Future    Dyslipidemia  Lipid profile came back unremarkable continue with atorvastatin 20 mg at bedtime  Orders:  •  atorvastatin (LIPITOR) 20 mg tablet; Take 1 tablet (20 mg total) by mouth daily at bedtime    Age-related osteoporosis without current pathological fracture  Patient could not tolerate the medications refusing to go for    DEXA scan recommend patient take calcium and vitamin D       Esophagitis  Continue Protonix follow-up with the gastroenterologist for an upper endoscopy  Orders:  •  Ambulatory referral to Gastroenterology; Future    Primary hypertension  Blood pressure today is 142/70 continue lisinopril 20 mg daily       Primary osteoarthritis involving multiple joints  Tylenol as needed avoid nonsteroidals       Medicare annual wellness visit, subsequent           Depression Screening and Follow-up Plan: Patient was screened for depression during today's encounter. They screened negative with a PHQ-2 score of 0.      Falls Plan of Care: balance, strength, and gait training instructions were  provided. Recommended assistive device to help with gait and balance. Medications that increase falls were reviewed.       Preventive health issues were discussed with patient, and age appropriate screening tests were ordered as noted in patient's After Visit Summary. Personalized health advice and appropriate referrals for health education or preventive services given if needed, as noted in patient's After Visit Summary.    History of Present Illness     Patient is coming here for a follow-up evaluation with regards to symptoms of hypertension, dyslipidemia, DJD, acute blood loss anemia recovering, GERD, denies any symptoms of abdominal pain nausea vomiting no flank pain.  Lab reveals urine suggestive of UTI with Klebsiella.    Patient was supposed to get an upper endoscopy done because of the GI bleed not done yet we will reorder it.    Medication reviewed labs reviewed.       Patient Care Team:  Geovani Hollingsworth MD as PCP - General (Internal Medicine)  Pablo John MD as Endoscopist    Review of Systems   Constitutional:  Negative for chills and fever.   HENT:  Negative for ear pain and sore throat.    Eyes:  Negative for pain and visual disturbance.   Respiratory:  Negative for cough and shortness of breath.    Cardiovascular:  Negative for chest pain and palpitations.   Gastrointestinal:  Negative for abdominal pain and vomiting.   Genitourinary:  Negative for dysuria and hematuria.   Musculoskeletal:  Positive for arthralgias. Negative for back pain.   Skin:  Negative for color change and rash.   Neurological:  Negative for seizures and syncope.   All other systems reviewed and are negative.    Medical History Reviewed by provider this encounter:  Tobacco  Allergies  Meds  Problems  Med Hx  Surg Hx  Fam Hx       Annual Wellness Visit Questionnaire   Pattie is here for her Subsequent Wellness visit.     Health Risk Assessment:   Patient rates overall health as very good. Patient feels that their  physical health rating is same. Patient is satisfied with their life. Eyesight was rated as same. Hearing was rated as same. Patient feels that their emotional and mental health rating is same. Patients states they are never, rarely angry. Patient states they are sometimes unusually tired/fatigued. Pain experienced in the last 7 days has been none. Patient states that she has experienced no weight loss or gain in last 6 months. The patient appears to be quite able to enjoy her life.    Depression Screening:   PHQ-2 Score: 0      Fall Risk Screening:   In the past year, patient has experienced: history of falling in past year    Number of falls: 1  Injured during fall?: Yes    Feels unsteady when standing or walking?: No    Worried about falling?: No      Urinary Incontinence Screening:   Patient has not leaked urine accidently in the last six months. Leaky bladder    Home Safety:  Patient does not have trouble with stairs inside or outside of their home. Patient has no working smoke alarms and has no working carbon monoxide detector. Home safety hazards include: none.     Nutrition:   Current diet is Regular. Eats just about everything    Medications:   Patient is currently taking over-the-counter supplements. OTC medications include: see medication list. Patient is able to manage medications. The patient is able to manage her medications.    Activities of Daily Living (ADLs)/Instrumental Activities of Daily Living (IADLs):   Walk and transfer into and out of bed and chair?: Yes  Dress and groom yourself?: Yes    Bathe or shower yourself?: Yes    Feed yourself? Yes  Do your laundry/housekeeping?: Yes  Manage your money, pay your bills and track your expenses?: Yes  Make your own meals?: Yes    Do your own shopping?: No    ADL comments: Cousin helps with shopping.    Previous Hospitalizations:   Any hospitalizations or ED visits within the last 12 months?: Yes    How many hospitalizations have you had in the last  year?: 1-2    Hospitalization Comments: Hernia and fractured shoulder     Advance Care Planning:   Living will: Yes    Durable POA for healthcare: Yes    Advanced directive: Yes    Five wishes given: No    End of Life Decisions reviewed with patient: Yes    Provider agrees with end of life decisions: Yes      Comments: Patient does not want intubation or tube feeding.  She wants DNR.  Her neighbor is the power of  for healthcare    Cognitive Screening:   Provider or family/friend/caregiver concerned regarding cognition?: No    Preventive Screenings      Cardiovascular Screening:    General: Screening Current      Diabetes Screening:     General: Screening Current      Colorectal Cancer Screening:     General: Screening Not Indicated      Breast Cancer Screening:     General: Screening Not Indicated      Cervical Cancer Screening:    General: Screening Not Indicated      Osteoporosis Screening:    General: History Osteoporosis and Screening Current      Abdominal Aortic Aneurysm (AAA) Screening:        General: Screening Not Indicated      Lung Cancer Screening:     General: Screening Not Indicated      Hepatitis C Screening:    General: Screening Not Indicated    Immunizations:  - Immunizations due: Prevnar 20 and Zoster (Shingrix)    Screening, Brief Intervention, and Referral to Treatment (SBIRT)     Screening  Typical number of drinks in a day: 0  Typical number of drinks in a week: 0  Interpretation: Low risk drinking behavior.    AUDIT-C Screenin) How often did you have a drink containing alcohol in the past year? never  2) How many drinks did you have on a typical day when you were drinking in the past year? 0  3) How often did you have 6 or more drinks on one occasion in the past year? never    AUDIT-C Score: 0  Interpretation: Score 0-2 (female): Negative screen for alcohol misuse    Single Item Drug Screening:  How often have you used an illegal drug (including marijuana) or a prescription  "medication for non-medical reasons in the past year? never    Single Item Drug Screen Score: 0  Interpretation: Negative screen for possible drug use disorder    Brief Intervention  Alcohol & drug use screenings were reviewed. No concerns regarding substance use disorder identified.     Other Counseling Topics:   Skin self-exam and calcium and vitamin D intake and regular weightbearing exercise.     Social Drivers of Health     Financial Resource Strain: Low Risk  (3/15/2023)    Overall Financial Resource Strain (CARDIA)    • Difficulty of Paying Living Expenses: Not very hard   Food Insecurity: No Food Insecurity (7/16/2025)    Nursing - Inadequate Food Risk Classification    • Worried About Running Out of Food in the Last Year: Never true    • Ran Out of Food in the Last Year: Never true   Transportation Needs: No Transportation Needs (7/16/2025)    PRAPARE - Transportation    • Lack of Transportation (Medical): No    • Lack of Transportation (Non-Medical): No   Housing Stability: Low Risk  (7/16/2025)    Housing Stability Vital Sign    • Unable to Pay for Housing in the Last Year: No    • Number of Times Moved in the Last Year: 0    • Homeless in the Last Year: No   Utilities: Not At Risk (7/16/2025)    [facility] Utilities    • Threatened with loss of utilities: No     No results found.    Objective   /70   Pulse 100   Ht 5' 4\" (1.626 m)   Wt 59 kg (130 lb)   LMP  (LMP Unknown)   SpO2 94%   BMI 22.31 kg/m²     Physical Exam  Vitals and nursing note reviewed.   Constitutional:       General: She is not in acute distress.     Appearance: She is well-developed.   HENT:      Head: Normocephalic and atraumatic.     Eyes:      Conjunctiva/sclera: Conjunctivae normal.       Cardiovascular:      Rate and Rhythm: Normal rate and regular rhythm.      Heart sounds: No murmur heard.  Pulmonary:      Effort: Pulmonary effort is normal. No respiratory distress.      Breath sounds: Normal breath sounds.   Abdominal:      " Palpations: Abdomen is soft.      Tenderness: There is no abdominal tenderness.     Musculoskeletal:         General: No swelling.      Cervical back: Neck supple.     Skin:     General: Skin is warm and dry.      Capillary Refill: Capillary refill takes less than 2 seconds.     Neurological:      Mental Status: She is alert and oriented to person, place, and time.     Psychiatric:         Mood and Affect: Mood normal.         Behavior: Behavior normal.       Recent Results (from the past 8 weeks)   CBC and differential    Collection Time: 06/25/25  9:22 AM   Result Value Ref Range    WBC 5.48 4.31 - 10.16 Thousand/uL    RBC 4.30 3.81 - 5.12 Million/uL    Hemoglobin 13.4 11.5 - 15.4 g/dL    Hematocrit 41.1 34.8 - 46.1 %    MCV 96 82 - 98 fL    MCH 31.2 26.8 - 34.3 pg    MCHC 32.6 31.4 - 37.4 g/dL    RDW 13.2 11.6 - 15.1 %    MPV 9.6 8.9 - 12.7 fL    Platelets 312 149 - 390 Thousands/uL    nRBC 0 /100 WBCs    Segmented % 55 43 - 75 %    Immature Grans % 0 0 - 2 %    Lymphocytes % 31 14 - 44 %    Monocytes % 11 4 - 12 %    Eosinophils Relative 2 0 - 6 %    Basophils Relative 1 0 - 1 %    Absolute Neutrophils 3.02 1.85 - 7.62 Thousands/µL    Absolute Immature Grans 0.01 0.00 - 0.20 Thousand/uL    Absolute Lymphocytes 1.69 0.60 - 4.47 Thousands/µL    Absolute Monocytes 0.60 0.17 - 1.22 Thousand/µL    Eosinophils Absolute 0.12 0.00 - 0.61 Thousand/µL    Basophils Absolute 0.04 0.00 - 0.10 Thousands/µL   Comprehensive metabolic panel    Collection Time: 06/25/25  9:22 AM   Result Value Ref Range    Sodium 138 135 - 147 mmol/L    Potassium 4.0 3.5 - 5.3 mmol/L    Chloride 102 96 - 108 mmol/L    CO2 27 21 - 32 mmol/L    ANION GAP 9 4 - 13 mmol/L    BUN 18 5 - 25 mg/dL    Creatinine 0.86 0.60 - 1.30 mg/dL    Glucose, Fasting 80 65 - 99 mg/dL    Calcium 9.5 8.4 - 10.2 mg/dL    AST 20 13 - 39 U/L    ALT 11 7 - 52 U/L    Alkaline Phosphatase 83 34 - 104 U/L    Total Protein 7.1 6.4 - 8.4 g/dL    Albumin 4.5 3.5 - 5.0 g/dL     Total Bilirubin 0.60 0.20 - 1.00 mg/dL    eGFR 60 ml/min/1.73sq m   Hemoglobin A1C    Collection Time: 06/25/25  9:22 AM   Result Value Ref Range    Hemoglobin A1C 5.7 (H) Normal 4.0-5.6%; PreDiabetic 5.7-6.4%; Diabetic >=6.5%; Glycemic control for adults with diabetes <7.0% %     mg/dl   Lipid panel    Collection Time: 06/25/25  9:22 AM   Result Value Ref Range    Cholesterol 152 See Comment mg/dL    Triglycerides 144 See Comment mg/dL    HDL, Direct 42 (L) >=50 mg/dL    LDL Calculated 81 0 - 100 mg/dL    Non-HDL-Chol (CHOL-HDL) 110 mg/dl   TSH, 3rd generation with Free T4 reflex    Collection Time: 06/25/25  9:22 AM   Result Value Ref Range    TSH 3RD GENERATION 2.588 0.450 - 4.500 uIU/mL   UA w Reflex to Microscopic w Reflex to Culture    Collection Time: 06/25/25  9:22 AM    Specimen: Urine, Clean Catch   Result Value Ref Range    Color, UA Light Yellow     Clarity, UA Clear     Specific Gravity, UA 1.009 1.003 - 1.030    pH, UA 7.0 4.5, 5.0, 5.5, 6.0, 6.5, 7.0, 7.5, 8.0    Leukocytes, UA Large (A) Negative    Nitrite, UA Positive (A) Negative    Protein, UA Negative Negative mg/dl    Glucose, UA Negative Negative mg/dl    Ketones, UA Negative Negative mg/dl    Urobilinogen, UA <2.0 <2.0 mg/dl mg/dl    Bilirubin, UA Negative Negative    Occult Blood, UA Trace (A) Negative   Urine Microscopic    Collection Time: 06/25/25  9:22 AM   Result Value Ref Range    RBC, UA 2-4 (A) None Seen, 1-2 /hpf    WBC, UA Innumerable (A) None Seen, 1-2 /hpf    Epithelial Cells Occasional None Seen, Occasional /hpf    Bacteria, UA Occasional None Seen, Occasional /hpf   Urine culture    Collection Time: 06/25/25  9:22 AM    Specimen: Urine, Clean Catch   Result Value Ref Range    Urine Culture >100,000 cfu/ml Klebsiella pneumoniae (A)        Susceptibility    Klebsiella pneumoniae - TAINA     ZID Performed Yes       Amoxicillin + Clavulanate <=8/4 Susceptible ug/ml     Ampicillin ($$) >16.00 Resistant ug/ml     Ampicillin +  Sulbactam ($) <=4/2 Susceptible ug/ml     Aztreonam ($$$)  <=4 Susceptible ug/ml     Cefazolin ($) <=2.00 Susceptible ug/ml     Ciprofloxacin ($)  <=0.25 Susceptible ug/ml     Gentamicin ($$) <=2 Susceptible ug/ml     Levofloxacin ($) <=0.50 Susceptible ug/ml     Nitrofurantoin <=32 Susceptible ug/ml     Tetracycline <=4 Susceptible ug/ml     Trimethoprim + Sulfamethoxazole ($$$) <=0.5/9.5 Susceptible ug/ml

## 2025-07-16 NOTE — ASSESSMENT & PLAN NOTE
Lipid profile came back unremarkable continue with atorvastatin 20 mg at bedtime  Orders:  •  atorvastatin (LIPITOR) 20 mg tablet; Take 1 tablet (20 mg total) by mouth daily at bedtime

## 2025-07-16 NOTE — ASSESSMENT & PLAN NOTE
Patient has an acute blood loss pneumonia globin is Better Currently on Iron Supplements Last Hemoglobin Is at 13.4 Hematocrit 41.1 However She Needs an Upper Endoscopy Done Because of the Upper GI Bleed Most Likely Secondary to Esophagitis Will Order the Same.  Orders:  •  Ambulatory referral to Gastroenterology; Future

## 2025-07-16 NOTE — PATIENT INSTRUCTIONS
Medicare Preventive Visit Patient Instructions  Thank you for completing your Welcome to Medicare Visit or Medicare Annual Wellness Visit today. Your next wellness visit will be due in one year (7/17/2026).  The screening/preventive services that you may require over the next 5-10 years are detailed below. Some tests may not apply to you based off risk factors and/or age. Screening tests ordered at today's visit but not completed yet may show as past due. Also, please note that scanned in results may not display below.  Preventive Screenings:  Service Recommendations Previous Testing/Comments   Colorectal Cancer Screening  * Colonoscopy    * Fecal Occult Blood Test (FOBT)/Fecal Immunochemical Test (FIT)  * Fecal DNA/Cologuard Test  * Flexible Sigmoidoscopy Age: 45-75 years old   Colonoscopy: every 10 years (may be performed more frequently if at higher risk)  OR  FOBT/FIT: every 1 year  OR  Cologuard: every 3 years  OR  Sigmoidoscopy: every 5 years  Screening may be recommended earlier than age 45 if at higher risk for colorectal cancer. Also, an individualized decision between you and your healthcare provider will decide whether screening between the ages of 76-85 would be appropriate. Colonoscopy: Not on file  FOBT/FIT: Not on file  Cologuard: Not on file  Sigmoidoscopy: Not on file          Breast Cancer Screening Age: 40+ years old  Frequency: every 1-2 years  Not required if history of left and right mastectomy Mammogram: 05/07/2021        Cervical Cancer Screening Between the ages of 21-29, pap smear recommended once every 3 years.   Between the ages of 30-65, can perform pap smear with HPV co-testing every 5 years.   Recommendations may differ for women with a history of total hysterectomy, cervical cancer, or abnormal pap smears in past. Pap Smear: Not on file        Hepatitis C Screening Once for adults born between 1945 and 1965  More frequently in patients at high risk for Hepatitis C Hep C Antibody: Not  on file        Diabetes Screening 1-2 times per year if you're at risk for diabetes or have pre-diabetes Fasting glucose: 80 mg/dL (6/25/2025)  A1C: 5.7 % (6/25/2025)      Cholesterol Screening Once every 5 years if you don't have a lipid disorder. May order more often based on risk factors. Lipid panel: 06/25/2025          Other Preventive Screenings Covered by Medicare:  Abdominal Aortic Aneurysm (AAA) Screening: covered once if your at risk. You're considered to be at risk if you have a family history of AAA.  Lung Cancer Screening: covers low dose CT scan once per year if you meet all of the following conditions: (1) Age 55-77; (2) No signs or symptoms of lung cancer; (3) Current smoker or have quit smoking within the last 15 years; (4) You have a tobacco smoking history of at least 20 pack years (packs per day multiplied by number of years you smoked); (5) You get a written order from a healthcare provider.  Glaucoma Screening: covered annually if you're considered high risk: (1) You have diabetes OR (2) Family history of glaucoma OR (3)  aged 50 and older OR (4)  American aged 65 and older  Osteoporosis Screening: covered every 2 years if you meet one of the following conditions: (1) You're estrogen deficient and at risk for osteoporosis based off medical history and other findings; (2) Have a vertebral abnormality; (3) On glucocorticoid therapy for more than 3 months; (4) Have primary hyperparathyroidism; (5) On osteoporosis medications and need to assess response to drug therapy.   Last bone density test (DXA Scan): 10/03/2023.  HIV Screening: covered annually if you're between the age of 15-65. Also covered annually if you are younger than 15 and older than 65 with risk factors for HIV infection. For pregnant patients, it is covered up to 3 times per pregnancy.    Immunizations:  Immunization Recommendations   Influenza Vaccine Annual influenza vaccination during flu season is  recommended for all persons aged >= 6 months who do not have contraindications   Pneumococcal Vaccine   * Pneumococcal conjugate vaccine = PCV13 (Prevnar 13), PCV15 (Vaxneuvance), PCV20 (Prevnar 20)  * Pneumococcal polysaccharide vaccine = PPSV23 (Pneumovax) Adults 19-63 yo with certain risk factors or if 65+ yo  If never received any pneumonia vaccine: recommend Prevnar 20 (PCV20)  Give PCV20 if previously received 1 dose of PCV13 or PPSV23   Hepatitis B Vaccine 3 dose series if at intermediate or high risk (ex: diabetes, end stage renal disease, liver disease)   Respiratory syncytial virus (RSV) Vaccine - COVERED BY MEDICARE PART D  * RSVPreF3 (Arexvy) CDC recommends that adults 60 years of age and older may receive a single dose of RSV vaccine using shared clinical decision-making (SCDM)   Tetanus (Td) Vaccine - COST NOT COVERED BY MEDICARE PART B Following completion of primary series, a booster dose should be given every 10 years to maintain immunity against tetanus. Td may also be given as tetanus wound prophylaxis.   Tdap Vaccine - COST NOT COVERED BY MEDICARE PART B Recommended at least once for all adults. For pregnant patients, recommended with each pregnancy.   Shingles Vaccine (Shingrix) - COST NOT COVERED BY MEDICARE PART B  2 shot series recommended in those 19 years and older who have or will have weakened immune systems or those 50 years and older     Health Maintenance Due:  There are no preventive care reminders to display for this patient.  Immunizations Due:      Topic Date Due   • Pneumococcal Vaccine: 50+ Years (1 of 1 - PCV) Never done   • COVID-19 Vaccine (4 - 2024-25 season) 04/30/2025   • Influenza Vaccine (1) 09/01/2025     Advance Directives   What are advance directives?  Advance directives are legal documents that state your wishes and plans for medical care. These plans are made ahead of time in case you lose your ability to make decisions for yourself. Advance directives can apply to  any medical decision, such as the treatments you want, and if you want to donate organs.   What are the types of advance directives?  There are many types of advance directives, and each state has rules about how to use them. You may choose a combination of any of the following:  Living will:  This is a written record of the treatment you want. You can also choose which treatments you do not want, which to limit, and which to stop at a certain time. This includes surgery, medicine, IV fluid, and tube feedings.   Durable power of  for healthcare (DPAHC):  This is a written record that states who you want to make healthcare choices for you when you are unable to make them for yourself. This person, called a proxy, is usually a family member or a friend. You may choose more than 1 proxy.  Do not resuscitate (DNR) order:  A DNR order is used in case your heart stops beating or you stop breathing. It is a request not to have certain forms of treatment, such as CPR. A DNR order may be included in other types of advance directives.  Medical directive:  This covers the care that you want if you are in a coma, near death, or unable to make decisions for yourself. You can list the treatments you want for each condition. Treatment may include pain medicine, surgery, blood transfusions, dialysis, IV or tube feedings, and a ventilator (breathing machine).  Values history:  This document has questions about your views, beliefs, and how you feel and think about life. This information can help others choose the care that you would choose.  Why are advance directives important?  An advance directive helps you control your care. Although spoken wishes may be used, it is better to have your wishes written down. Spoken wishes can be misunderstood, or not followed. Treatments may be given even if you do not want them. An advance directive may make it easier for your family to make difficult choices about your care.       ©  Copyright [employer] 2018 Information is for End User's use only and may not be sold, redistributed or otherwise used for commercial purposes. All illustrations and images included in CareNotes® are the copyrighted property of [employer] or [employer]

## 2025-07-16 NOTE — ASSESSMENT & PLAN NOTE
Continue Protonix follow-up with the gastroenterologist for an upper endoscopy  Orders:  •  Ambulatory referral to Gastroenterology; Future

## 2025-08-20 DIAGNOSIS — K20.90 ESOPHAGITIS: ICD-10-CM

## 2025-08-21 RX ORDER — PANTOPRAZOLE SODIUM 40 MG/1
40 TABLET, DELAYED RELEASE ORAL DAILY
Qty: 90 TABLET | Refills: 3 | Status: SHIPPED | OUTPATIENT
Start: 2025-08-21

## (undated) DEVICE — AIRLIFE™  ADULT CUSHION NASAL CANNULA WITH 7 FOOT (2.1 M) CRUSH-RESISTANT OXYGEN TUBING, AND U/CONNECT-IT ADAPTER: Brand: AIRLIFE™

## (undated) DEVICE — GAUZE SPONGES,16 PLY: Brand: CURITY

## (undated) DEVICE — SOLIDIFIER FLUID WASTE CONTROL 1500ML

## (undated) DEVICE — GLOVE EXAM NON-STRL NTRL PLUS LRG PF

## (undated) DEVICE — LUBRICANT SURGILUBE TUBE 4 OZ  FLIP TOP

## (undated) DEVICE — MEDI-VAC YANKAUER SUCTION HANDLE: Brand: CARDINAL HEALTH

## (undated) DEVICE — SINGLE-USE POLYPECTOMY SNARE: Brand: SENSATION SHORT THROW

## (undated) DEVICE — TRAP POLY

## (undated) DEVICE — TUBING BUBBLE CLEAR 5MM X 100 FT NS

## (undated) DEVICE — 1200CC GUARDIAN II: Brand: GUARDIAN

## (undated) DEVICE — TUBING AUX CHANNEL

## (undated) DEVICE — "MH-438 A/W VLVE F/140 EVIS-140": Brand: AIR/WATER VALVE

## (undated) DEVICE — "MAJ-901 WATER CONTAINER SET CV-160/140": Brand: WATER CONTAINER

## (undated) RX ORDER — BRIMONIDINE TARTRATE, TIMOLOL MALEATE 2; 5 MG/ML; MG/ML
1 SOLUTION/ DROPS OPHTHALMIC TWICE A DAY
Start: 2022-04-11

## (undated) RX ORDER — ACETAZOLAMIDE 125 MG/1: 1 TABLET ORAL